# Patient Record
Sex: FEMALE | Race: BLACK OR AFRICAN AMERICAN | NOT HISPANIC OR LATINO | Employment: OTHER | URBAN - METROPOLITAN AREA
[De-identification: names, ages, dates, MRNs, and addresses within clinical notes are randomized per-mention and may not be internally consistent; named-entity substitution may affect disease eponyms.]

---

## 2023-03-18 DIAGNOSIS — E11.9 TYPE 2 DIABETES MELLITUS WITHOUT COMPLICATION, WITH LONG-TERM CURRENT USE OF INSULIN (MULTI): Primary | ICD-10-CM

## 2023-03-18 DIAGNOSIS — Z79.4 TYPE 2 DIABETES MELLITUS WITHOUT COMPLICATION, WITH LONG-TERM CURRENT USE OF INSULIN (MULTI): Primary | ICD-10-CM

## 2023-03-20 RX ORDER — INSULIN GLARGINE 100 [IU]/ML
INJECTION, SOLUTION SUBCUTANEOUS
Qty: 15 ML | Refills: 5 | Status: SHIPPED | OUTPATIENT
Start: 2023-03-20 | End: 2024-02-18 | Stop reason: SDUPTHER

## 2023-05-18 DIAGNOSIS — F32.A DEPRESSION, UNSPECIFIED DEPRESSION TYPE: ICD-10-CM

## 2023-05-18 DIAGNOSIS — I10 PRIMARY HYPERTENSION: Primary | ICD-10-CM

## 2023-05-19 RX ORDER — ESCITALOPRAM OXALATE 10 MG/1
TABLET ORAL
Qty: 90 TABLET | Refills: 1 | Status: SHIPPED | OUTPATIENT
Start: 2023-05-19 | End: 2024-02-26 | Stop reason: SDUPTHER

## 2023-05-19 RX ORDER — VALSARTAN AND HYDROCHLOROTHIAZIDE 160; 25 MG/1; MG/1
TABLET ORAL
Qty: 90 TABLET | Refills: 3 | Status: SHIPPED | OUTPATIENT
Start: 2023-05-19 | End: 2024-02-26 | Stop reason: SDUPTHER

## 2023-06-17 DIAGNOSIS — I10 PRIMARY HYPERTENSION: Primary | ICD-10-CM

## 2023-06-19 RX ORDER — ISOSORBIDE MONONITRATE 60 MG/1
TABLET, EXTENDED RELEASE ORAL
Qty: 90 TABLET | Refills: 1 | Status: SHIPPED | OUTPATIENT
Start: 2023-06-19 | End: 2024-02-26 | Stop reason: SDUPTHER

## 2023-07-11 DIAGNOSIS — E08.00 DIABETES MELLITUS DUE TO UNDERLYING CONDITION WITH HYPEROSMOLARITY WITHOUT COMA, WITHOUT LONG-TERM CURRENT USE OF INSULIN (MULTI): Primary | ICD-10-CM

## 2023-07-12 RX ORDER — PEN NEEDLE, DIABETIC 31 GX5/16"
NEEDLE, DISPOSABLE MISCELLANEOUS
Qty: 100 EACH | Refills: 5 | Status: SHIPPED | OUTPATIENT
Start: 2023-07-12 | End: 2023-11-09 | Stop reason: SDUPTHER

## 2023-07-17 PROBLEM — I21.4 NON-ST ELEVATION (NSTEMI) MYOCARDIAL INFARCTION (MULTI): Status: ACTIVE | Noted: 2023-07-17

## 2023-07-17 PROBLEM — I63.9 STROKE, EMBOLIC (MULTI): Status: ACTIVE | Noted: 2023-07-17

## 2023-07-17 PROBLEM — E78.5 HYPERLIPIDEMIA: Status: ACTIVE | Noted: 2023-07-17

## 2023-07-17 PROBLEM — M17.0 OSTEOARTHRITIS OF BOTH KNEES: Status: ACTIVE | Noted: 2023-07-17

## 2023-07-17 PROBLEM — E11.40 DIABETIC NEUROPATHY (MULTI): Status: ACTIVE | Noted: 2023-07-17

## 2023-07-17 PROBLEM — I25.10 2-VESSEL CORONARY ARTERY DISEASE: Status: ACTIVE | Noted: 2023-07-17

## 2023-07-17 PROBLEM — I25.5 ISCHEMIC DILATED CARDIOMYOPATHY (MULTI): Status: ACTIVE | Noted: 2023-07-17

## 2023-07-17 PROBLEM — G47.33 OSA (OBSTRUCTIVE SLEEP APNEA): Status: ACTIVE | Noted: 2023-07-17

## 2023-07-17 PROBLEM — M19.079 ARTHRITIS OF FOOT: Status: ACTIVE | Noted: 2023-07-17

## 2023-07-17 PROBLEM — I42.0 ISCHEMIC DILATED CARDIOMYOPATHY (MULTI): Status: ACTIVE | Noted: 2023-07-17

## 2023-07-17 PROBLEM — G89.29 CHRONIC PAIN OF BOTH KNEES: Status: ACTIVE | Noted: 2023-07-17

## 2023-07-17 PROBLEM — N60.99 ATYPICAL DUCTAL HYPERPLASIA OF BREAST: Status: ACTIVE | Noted: 2023-07-17

## 2023-07-17 PROBLEM — N18.31 DIABETES MELLITUS WITH STAGE 3A CHRONIC KIDNEY DISEASE, WITHOUT LONG-TERM CURRENT USE OF INSULIN (MULTI): Status: ACTIVE | Noted: 2023-07-17

## 2023-07-17 PROBLEM — N18.31 CHRONIC RENAL IMPAIRMENT, STAGE 3A (MULTI): Status: ACTIVE | Noted: 2023-07-17

## 2023-07-17 PROBLEM — E66.9 OBESITY: Status: ACTIVE | Noted: 2023-07-17

## 2023-07-17 PROBLEM — M25.561 CHRONIC PAIN OF BOTH KNEES: Status: ACTIVE | Noted: 2023-07-17

## 2023-07-17 PROBLEM — M25.562 CHRONIC PAIN OF BOTH KNEES: Status: ACTIVE | Noted: 2023-07-17

## 2023-07-17 PROBLEM — N95.1 MENOPAUSE SYNDROME: Status: ACTIVE | Noted: 2023-07-17

## 2023-07-17 PROBLEM — E11.22 DIABETES MELLITUS WITH STAGE 3A CHRONIC KIDNEY DISEASE, WITHOUT LONG-TERM CURRENT USE OF INSULIN (MULTI): Status: ACTIVE | Noted: 2023-07-17

## 2023-07-17 PROBLEM — I10 BENIGN ESSENTIAL HYPERTENSION: Status: ACTIVE | Noted: 2023-07-17

## 2023-07-17 PROBLEM — F41.8 DEPRESSION WITH ANXIETY: Status: ACTIVE | Noted: 2023-07-17

## 2023-07-17 PROBLEM — Z04.9 CONDITION NOT FOUND: Status: ACTIVE | Noted: 2023-07-17

## 2023-07-17 PROBLEM — G62.9 PERIPHERAL NEUROPATHY: Status: ACTIVE | Noted: 2023-07-17

## 2023-07-20 DIAGNOSIS — E78.5 HYPERLIPIDEMIA, UNSPECIFIED HYPERLIPIDEMIA TYPE: ICD-10-CM

## 2023-07-21 RX ORDER — ATORVASTATIN CALCIUM 80 MG/1
TABLET, FILM COATED ORAL
Qty: 90 TABLET | Refills: 0 | Status: SHIPPED | OUTPATIENT
Start: 2023-07-21 | End: 2023-10-20

## 2023-08-01 ENCOUNTER — OFFICE VISIT (OUTPATIENT)
Dept: PRIMARY CARE | Facility: CLINIC | Age: 73
End: 2023-08-01
Payer: MEDICARE

## 2023-08-01 VITALS
DIASTOLIC BLOOD PRESSURE: 80 MMHG | BODY MASS INDEX: 34.9 KG/M2 | WEIGHT: 197 LBS | TEMPERATURE: 97.2 F | SYSTOLIC BLOOD PRESSURE: 130 MMHG

## 2023-08-01 DIAGNOSIS — I63.10 CEREBROVASCULAR ACCIDENT (CVA) DUE TO EMBOLISM OF PRECEREBRAL ARTERY (MULTI): ICD-10-CM

## 2023-08-01 DIAGNOSIS — M17.0 PRIMARY OSTEOARTHRITIS OF BOTH KNEES: ICD-10-CM

## 2023-08-01 DIAGNOSIS — N18.31 CHRONIC RENAL IMPAIRMENT, STAGE 3A (MULTI): ICD-10-CM

## 2023-08-01 DIAGNOSIS — I25.5 ISCHEMIC DILATED CARDIOMYOPATHY (MULTI): ICD-10-CM

## 2023-08-01 DIAGNOSIS — I42.0 ISCHEMIC DILATED CARDIOMYOPATHY (MULTI): ICD-10-CM

## 2023-08-01 DIAGNOSIS — E08.00 DIABETES MELLITUS DUE TO UNDERLYING CONDITION WITH HYPEROSMOLARITY WITHOUT COMA, WITH LONG-TERM CURRENT USE OF INSULIN (MULTI): Primary | ICD-10-CM

## 2023-08-01 DIAGNOSIS — F41.8 DEPRESSION WITH ANXIETY: ICD-10-CM

## 2023-08-01 DIAGNOSIS — I10 PRIMARY HYPERTENSION: ICD-10-CM

## 2023-08-01 DIAGNOSIS — Z79.4 DIABETES MELLITUS DUE TO UNDERLYING CONDITION WITH HYPEROSMOLARITY WITHOUT COMA, WITH LONG-TERM CURRENT USE OF INSULIN (MULTI): Primary | ICD-10-CM

## 2023-08-01 PROBLEM — E11.22 DIABETES MELLITUS WITH STAGE 3A CHRONIC KIDNEY DISEASE, WITHOUT LONG-TERM CURRENT USE OF INSULIN (MULTI): Status: RESOLVED | Noted: 2023-07-17 | Resolved: 2023-08-01

## 2023-08-01 PROBLEM — E11.40 DIABETIC NEUROPATHY (MULTI): Status: RESOLVED | Noted: 2023-07-17 | Resolved: 2023-08-01

## 2023-08-01 PROCEDURE — 99214 OFFICE O/P EST MOD 30 MIN: CPT | Performed by: INTERNAL MEDICINE

## 2023-08-01 PROCEDURE — 1036F TOBACCO NON-USER: CPT | Performed by: INTERNAL MEDICINE

## 2023-08-01 PROCEDURE — 1160F RVW MEDS BY RX/DR IN RCRD: CPT | Performed by: INTERNAL MEDICINE

## 2023-08-01 PROCEDURE — 1159F MED LIST DOCD IN RCRD: CPT | Performed by: INTERNAL MEDICINE

## 2023-08-01 PROCEDURE — 1126F AMNT PAIN NOTED NONE PRSNT: CPT | Performed by: INTERNAL MEDICINE

## 2023-08-01 PROCEDURE — 3079F DIAST BP 80-89 MM HG: CPT | Performed by: INTERNAL MEDICINE

## 2023-08-01 PROCEDURE — 3075F SYST BP GE 130 - 139MM HG: CPT | Performed by: INTERNAL MEDICINE

## 2023-08-01 RX ORDER — AMLODIPINE BESYLATE 5 MG/1
1 TABLET ORAL DAILY
COMMUNITY
Start: 2018-01-22 | End: 2024-02-26 | Stop reason: SDUPTHER

## 2023-08-01 RX ORDER — HYDRALAZINE HYDROCHLORIDE 25 MG/1
TABLET, FILM COATED ORAL 2 TIMES DAILY
COMMUNITY
End: 2024-01-29 | Stop reason: WASHOUT

## 2023-08-01 RX ORDER — GLIPIZIDE 10 MG/1
TABLET, FILM COATED, EXTENDED RELEASE ORAL 2 TIMES DAILY
COMMUNITY

## 2023-08-01 RX ORDER — HYALURONATE SODIUM, STABILIZED 60 MG/3 ML
SYRINGE (ML) INTRAARTICULAR
COMMUNITY
Start: 2022-04-20

## 2023-08-01 RX ORDER — CLOPIDOGREL BISULFATE 75 MG/1
1 TABLET ORAL DAILY
COMMUNITY
Start: 2019-05-16 | End: 2024-01-29 | Stop reason: WASHOUT

## 2023-08-01 RX ORDER — CARVEDILOL 12.5 MG/1
TABLET ORAL 2 TIMES DAILY
COMMUNITY
End: 2024-01-29 | Stop reason: SDUPTHER

## 2023-08-01 RX ORDER — FUROSEMIDE 20 MG/1
20 TABLET ORAL
COMMUNITY
Start: 2008-09-10 | End: 2024-01-29 | Stop reason: WASHOUT

## 2023-08-01 RX ORDER — EMPAGLIFLOZIN 10 MG/1
10 TABLET, FILM COATED ORAL DAILY
COMMUNITY
End: 2024-02-26 | Stop reason: SDUPTHER

## 2023-08-01 RX ORDER — CARVEDILOL 25 MG/1
25 TABLET ORAL DAILY
COMMUNITY
Start: 2008-10-01 | End: 2024-01-29 | Stop reason: WASHOUT

## 2023-08-01 RX ORDER — BLOOD-GLUCOSE SENSOR
1 EACH MISCELLANEOUS DAILY
Qty: 1 EACH | Refills: 1 | Status: SHIPPED | OUTPATIENT
Start: 2023-08-01

## 2023-08-01 RX ORDER — METOPROLOL SUCCINATE 25 MG/1
12.5 TABLET, EXTENDED RELEASE ORAL NIGHTLY
COMMUNITY
End: 2024-02-26 | Stop reason: SDUPTHER

## 2023-08-01 RX ORDER — ASPIRIN 81 MG/1
1 TABLET ORAL DAILY
COMMUNITY
Start: 2022-07-27

## 2023-08-01 RX ORDER — BLOOD SUGAR DIAGNOSTIC
STRIP MISCELLANEOUS
COMMUNITY
Start: 2022-01-27

## 2023-08-01 RX ORDER — GABAPENTIN 100 MG/1
100 CAPSULE ORAL 3 TIMES DAILY
COMMUNITY

## 2023-08-01 ASSESSMENT — ENCOUNTER SYMPTOMS
BLOOD IN STOOL: 0
CHEST TIGHTNESS: 0
SHORTNESS OF BREATH: 0
NECK PAIN: 0
FLANK PAIN: 0
LOSS OF SENSATION IN FEET: 0
BACK PAIN: 0
WEAKNESS: 0
NERVOUS/ANXIOUS: 0
OCCASIONAL FEELINGS OF UNSTEADINESS: 0
LIGHT-HEADEDNESS: 0
FREQUENCY: 0
DYSURIA: 0
ARTHRALGIAS: 1
HEADACHES: 0
SORE THROAT: 0
PALPITATIONS: 0
DIFFICULTY URINATING: 0
DEPRESSION: 0
APPETITE CHANGE: 0
DECREASED CONCENTRATION: 0
WHEEZING: 0
SLEEP DISTURBANCE: 0
COUGH: 0
DIZZINESS: 0
CHILLS: 0
ACTIVITY CHANGE: 0
ABDOMINAL PAIN: 0
UNEXPECTED WEIGHT CHANGE: 0

## 2023-08-01 NOTE — PROGRESS NOTES
Subjective   Patient ID: Keesha Franklin is a 73 y.o. female.    HPI patient is seen today for routine follow-up   Her medical history is significant for history of embolic stroke, anxiety, depression, hypertension, chronic kidney disease, dilated cardiomyopathy.  She is experiencing worsening pain in both knees.  She is currently following with orthopedics.  She is considering getting knee replacement in the next few months.  Review of Systems   Constitutional:  Negative for activity change, appetite change, chills and unexpected weight change.   HENT:  Negative for congestion, postnasal drip and sore throat.    Eyes:  Negative for visual disturbance.   Respiratory:  Negative for cough, chest tightness, shortness of breath and wheezing.    Cardiovascular:  Negative for chest pain, palpitations and leg swelling.   Gastrointestinal:  Negative for abdominal pain and blood in stool.   Endocrine: Negative for cold intolerance and heat intolerance.   Genitourinary:  Negative for difficulty urinating, dysuria, flank pain and frequency.   Musculoskeletal:  Positive for arthralgias. Negative for back pain, gait problem and neck pain.        B/L knee pain   Skin:  Negative for rash.   Allergic/Immunologic: Negative for environmental allergies and food allergies.   Neurological:  Negative for dizziness, weakness, light-headedness and headaches.   Psychiatric/Behavioral:  Negative for decreased concentration and sleep disturbance. The patient is not nervous/anxious.        Objective   Visit Vitals  /80   Temp 36.2 °C (97.2 °F)   Wt 89.4 kg (197 lb)   BMI 34.90 kg/m²   Smoking Status Never   BSA 1.99 m²      Physical Exam  Vitals reviewed.   Constitutional:       General: She is not in acute distress.     Appearance: Normal appearance.   HENT:      Head: Normocephalic and atraumatic.      Mouth/Throat:      Mouth: Mucous membranes are moist.   Eyes:      Extraocular Movements: Extraocular movements intact.       Conjunctiva/sclera: Conjunctivae normal.      Pupils: Pupils are equal, round, and reactive to light.   Cardiovascular:      Rate and Rhythm: Normal rate and regular rhythm.      Pulses: Normal pulses.   Pulmonary:      Effort: Pulmonary effort is normal. No respiratory distress.      Breath sounds: Normal breath sounds.   Abdominal:      General: Bowel sounds are normal. There is no distension.      Tenderness: There is no abdominal tenderness.   Musculoskeletal:         General: No swelling or tenderness. Normal range of motion.      Cervical back: Normal range of motion.   Skin:     General: Skin is warm.   Neurological:      General: No focal deficit present.      Mental Status: She is alert.      Coordination: Coordination normal.      Gait: Gait normal.   Psychiatric:         Mood and Affect: Mood normal.         Behavior: Behavior normal.         Assessment/Plan   Diagnoses and all orders for this visit:  Diabetes mellitus due to underlying condition with hyperosmolarity without coma, with long-term current use of insulin (CMS/HCC)  Comments:  Continue with Jardiance, Lantus , glipizide  Check hemoglobin A1c  Orders:  -     Comprehensive Metabolic Panel; Future  -     Hemoglobin A1C; Future  -     blood-glucose sensor (Dexcom G7 Sensor) device; 1 kit once daily.  Ischemic dilated cardiomyopathy (CMS/HCC)  Comments:  Continue with hydralazine, isosorbide, carvedilol,  Chronic renal impairment, stage 3a  Comments:  Check renal function today  Orders:  -     Comprehensive Metabolic Panel; Future  Depression with anxiety  Comments:  Well-controlled  Continue with Lexapro  Primary osteoarthritis of both knees  Comments:  Encouraged muscle strengthening-consider water classes  Follow-up with orthopedics as scheduled  Cerebrovascular accident (CVA) due to embolism of precerebral artery (CMS/HCC)  Comments:  Continue with atorvastatin 80 mg daily  Continue with Plavix  Primary  hypertension  Comments:  Well-controlled  Continue with current medications  Orders:  -     Comprehensive Metabolic Panel; Future

## 2023-08-16 ENCOUNTER — LAB (OUTPATIENT)
Dept: LAB | Facility: LAB | Age: 73
End: 2023-08-16
Payer: MEDICARE

## 2023-08-16 DIAGNOSIS — N18.31 CHRONIC RENAL IMPAIRMENT, STAGE 3A (MULTI): ICD-10-CM

## 2023-08-16 DIAGNOSIS — Z79.4 DIABETES MELLITUS DUE TO UNDERLYING CONDITION WITH HYPEROSMOLARITY WITHOUT COMA, WITH LONG-TERM CURRENT USE OF INSULIN (MULTI): ICD-10-CM

## 2023-08-16 DIAGNOSIS — I10 PRIMARY HYPERTENSION: ICD-10-CM

## 2023-08-16 DIAGNOSIS — E08.00 DIABETES MELLITUS DUE TO UNDERLYING CONDITION WITH HYPEROSMOLARITY WITHOUT COMA, WITH LONG-TERM CURRENT USE OF INSULIN (MULTI): ICD-10-CM

## 2023-08-16 LAB
ALANINE AMINOTRANSFERASE (SGPT) (U/L) IN SER/PLAS: 20 U/L (ref 7–45)
ALBUMIN (G/DL) IN SER/PLAS: 4.1 G/DL (ref 3.4–5)
ALKALINE PHOSPHATASE (U/L) IN SER/PLAS: 86 U/L (ref 33–136)
ANION GAP IN SER/PLAS: 15 MMOL/L (ref 10–20)
ASPARTATE AMINOTRANSFERASE (SGOT) (U/L) IN SER/PLAS: 24 U/L (ref 9–39)
BILIRUBIN TOTAL (MG/DL) IN SER/PLAS: 0.8 MG/DL (ref 0–1.2)
CALCIUM (MG/DL) IN SER/PLAS: 9.8 MG/DL (ref 8.6–10.6)
CARBON DIOXIDE, TOTAL (MMOL/L) IN SER/PLAS: 29 MMOL/L (ref 21–32)
CHLORIDE (MMOL/L) IN SER/PLAS: 103 MMOL/L (ref 98–107)
CREATININE (MG/DL) IN SER/PLAS: 1.3 MG/DL (ref 0.5–1.05)
ESTIMATED AVERAGE GLUCOSE FOR HBA1C: 160 MG/DL
GFR FEMALE: 43 ML/MIN/1.73M2
GLUCOSE (MG/DL) IN SER/PLAS: 71 MG/DL (ref 74–99)
HEMOGLOBIN A1C/HEMOGLOBIN TOTAL IN BLOOD: 7.2 %
POTASSIUM (MMOL/L) IN SER/PLAS: 3.7 MMOL/L (ref 3.5–5.3)
PROTEIN TOTAL: 7 G/DL (ref 6.4–8.2)
SODIUM (MMOL/L) IN SER/PLAS: 143 MMOL/L (ref 136–145)
UREA NITROGEN (MG/DL) IN SER/PLAS: 30 MG/DL (ref 6–23)

## 2023-08-16 PROCEDURE — 80053 COMPREHEN METABOLIC PANEL: CPT

## 2023-08-16 PROCEDURE — 83036 HEMOGLOBIN GLYCOSYLATED A1C: CPT

## 2023-08-16 PROCEDURE — 36415 COLL VENOUS BLD VENIPUNCTURE: CPT

## 2023-08-21 ENCOUNTER — TELEPHONE (OUTPATIENT)
Dept: PRIMARY CARE | Facility: CLINIC | Age: 73
End: 2023-08-21
Payer: MEDICARE

## 2023-08-21 NOTE — TELEPHONE ENCOUNTER
----- Message from Gabriel Mccabe MD sent at 8/17/2023  2:15 PM EDT -----  Please notify her that hemoglobin A1c is improving-7.2.  Kidney function is slightly worse.  Increase fluid intake and continue with current medications.

## 2023-09-05 ENCOUNTER — OFFICE VISIT (OUTPATIENT)
Dept: PRIMARY CARE | Facility: CLINIC | Age: 73
End: 2023-09-05
Payer: MEDICARE

## 2023-09-05 VITALS
WEIGHT: 200 LBS | SYSTOLIC BLOOD PRESSURE: 136 MMHG | TEMPERATURE: 97.1 F | DIASTOLIC BLOOD PRESSURE: 77 MMHG | BODY MASS INDEX: 35.43 KG/M2

## 2023-09-05 DIAGNOSIS — E78.49 OTHER HYPERLIPIDEMIA: ICD-10-CM

## 2023-09-05 DIAGNOSIS — Z79.4 DIABETES MELLITUS DUE TO UNDERLYING CONDITION WITH HYPEROSMOLARITY WITHOUT COMA, WITH LONG-TERM CURRENT USE OF INSULIN (MULTI): ICD-10-CM

## 2023-09-05 DIAGNOSIS — I10 BENIGN ESSENTIAL HYPERTENSION: Primary | ICD-10-CM

## 2023-09-05 DIAGNOSIS — F41.8 DEPRESSION WITH ANXIETY: ICD-10-CM

## 2023-09-05 DIAGNOSIS — I50.22 CHRONIC SYSTOLIC HEART FAILURE (MULTI): ICD-10-CM

## 2023-09-05 DIAGNOSIS — E08.00 DIABETES MELLITUS DUE TO UNDERLYING CONDITION WITH HYPEROSMOLARITY WITHOUT COMA, WITH LONG-TERM CURRENT USE OF INSULIN (MULTI): ICD-10-CM

## 2023-09-05 DIAGNOSIS — E66.01 OBESITY, MORBID (MULTI): ICD-10-CM

## 2023-09-05 DIAGNOSIS — N18.31 CHRONIC RENAL IMPAIRMENT, STAGE 3A (MULTI): ICD-10-CM

## 2023-09-05 PROBLEM — Z85.3 PERSONAL HISTORY OF MALIGNANT NEOPLASM OF BREAST: Status: ACTIVE | Noted: 2023-09-05

## 2023-09-05 PROCEDURE — 1036F TOBACCO NON-USER: CPT | Performed by: INTERNAL MEDICINE

## 2023-09-05 PROCEDURE — 3078F DIAST BP <80 MM HG: CPT | Performed by: INTERNAL MEDICINE

## 2023-09-05 PROCEDURE — 1159F MED LIST DOCD IN RCRD: CPT | Performed by: INTERNAL MEDICINE

## 2023-09-05 PROCEDURE — 3075F SYST BP GE 130 - 139MM HG: CPT | Performed by: INTERNAL MEDICINE

## 2023-09-05 PROCEDURE — 1160F RVW MEDS BY RX/DR IN RCRD: CPT | Performed by: INTERNAL MEDICINE

## 2023-09-05 PROCEDURE — 99214 OFFICE O/P EST MOD 30 MIN: CPT | Performed by: INTERNAL MEDICINE

## 2023-09-05 PROCEDURE — 1126F AMNT PAIN NOTED NONE PRSNT: CPT | Performed by: INTERNAL MEDICINE

## 2023-09-05 PROCEDURE — 3051F HG A1C>EQUAL 7.0%<8.0%: CPT | Performed by: INTERNAL MEDICINE

## 2023-09-05 ASSESSMENT — ENCOUNTER SYMPTOMS
DIZZINESS: 0
COUGH: 0
HEADACHES: 0
MYALGIAS: 0
ABDOMINAL PAIN: 0
CONSTIPATION: 0
FATIGUE: 0
SHORTNESS OF BREATH: 0
PALPITATIONS: 0
BACK PAIN: 0
DIARRHEA: 0
CHILLS: 0
NECK PAIN: 0
BLOOD IN STOOL: 0
FEVER: 0
ARTHRALGIAS: 0

## 2023-09-05 NOTE — PROGRESS NOTES
Subjective   Patient ID: Keesha Franklin is a 73 y.o. female.    HPI  Patient is seen today for medication concerns.  She has diabetes and was taking Januvia.  She states that her insurance stopped covering and she has to pay a lot to get medication filled.  She would like to switch to Ozempic.  Her medical history is significant for CAD,hypertension, history of CVA, hyperlipidemia, chronic systolic heart failure, anxiety and depression.  She feels well and is taking all her medications as prescribed.  Review of Systems   Constitutional:  Negative for chills, fatigue and fever.   Respiratory:  Negative for cough and shortness of breath.    Cardiovascular:  Negative for chest pain, palpitations and leg swelling.   Gastrointestinal:  Negative for abdominal pain, blood in stool, constipation and diarrhea.   Endocrine: Negative for cold intolerance and heat intolerance.   Musculoskeletal:  Negative for arthralgias, back pain, myalgias and neck pain.   Neurological:  Negative for dizziness and headaches.       Objective   Physical Exam  Vitals reviewed.   Constitutional:       General: She is not in acute distress.     Appearance: Normal appearance.   HENT:      Head: Normocephalic and atraumatic.   Cardiovascular:      Rate and Rhythm: Normal rate and regular rhythm.      Pulses: Normal pulses.   Pulmonary:      Effort: Pulmonary effort is normal. No respiratory distress.      Breath sounds: Normal breath sounds.   Abdominal:      General: Bowel sounds are normal. There is no distension.      Tenderness: There is no abdominal tenderness.   Musculoskeletal:         General: No swelling or tenderness. Normal range of motion.      Cervical back: Normal range of motion.   Skin:     General: Skin is warm.   Neurological:      General: No focal deficit present.      Mental Status: She is alert.      Coordination: Coordination normal.      Gait: Gait normal.   Psychiatric:         Mood and Affect: Mood normal.          Behavior: Behavior normal.         Assessment/Plan   Diagnoses and all orders for this visit:  Benign essential hypertension  Comments:  With amlodipine, valsartan/hydrochlorothiazide  Orders:  -     CBC; Future  -     Comprehensive Metabolic Panel; Future  -     Lipid Panel; Future  -     TSH with reflex to Free T4 if abnormal; Future  Other hyperlipidemia  -     TSH with reflex to Free T4 if abnormal; Future  Chronic systolic heart failure (CMS/HCC)  Comments:  Stable  Continue with carvedilol, hydralazine  Depression with anxiety  Comments:  Well-controlled  Continue with escitalopram  BMI 35.0-35.9,adult  -     semaglutide 0.25 mg or 0.5 mg (2 mg/3 mL) pen injector; Inject 0.25 mg under the skin 1 (one) time per week.  Chronic renal impairment, stage 3a (CMS/HCC)  Comments:  Monitor renal function  Diabetes mellitus due to underlying condition with hyperosmolarity without coma, with long-term current use of insulin (CMS/HCC)  Comments:  Discontinue Jardiance  Start Ozempic 0.25 mg injection once a week-side effects and mechanism of action explained and discussed in detail with the patient.  Orders:  -     Hemoglobin A1C; Future  Obesity, morbid (CMS/HCC)  Comments:  Continue with healthy diet and regular exercise  Follow in 3 mths

## 2023-09-05 NOTE — PATIENT INSTRUCTIONS
Start Ozempic injection-0.25 mg once a week  Eat healthy, increase your water intake and eat a balanced well-balanced diet  Discontinue Januvia  Blood work to be done 3 to 4 days before your next appointment  Follow-up in 3 months

## 2023-10-20 DIAGNOSIS — E78.5 HYPERLIPIDEMIA, UNSPECIFIED HYPERLIPIDEMIA TYPE: ICD-10-CM

## 2023-10-20 RX ORDER — ATORVASTATIN CALCIUM 80 MG/1
TABLET, FILM COATED ORAL
Qty: 90 TABLET | Refills: 0 | Status: SHIPPED | OUTPATIENT
Start: 2023-10-20 | End: 2024-02-26 | Stop reason: SDUPTHER

## 2023-10-30 PROBLEM — R92.8 ABNORMAL FINDING ON BREAST IMAGING: Status: ACTIVE | Noted: 2023-10-30

## 2023-10-30 PROBLEM — R29.818 SUSPECTED SLEEP APNEA: Status: ACTIVE | Noted: 2023-10-30

## 2023-10-30 PROBLEM — R79.89 LOW VITAMIN D LEVEL: Status: ACTIVE | Noted: 2023-10-30

## 2023-10-30 RX ORDER — NITROGLYCERIN 0.4 MG/1
0.4 TABLET SUBLINGUAL EVERY 5 MIN PRN
COMMUNITY

## 2023-10-31 ENCOUNTER — OFFICE VISIT (OUTPATIENT)
Dept: ORTHOPEDIC SURGERY | Facility: CLINIC | Age: 73
End: 2023-10-31
Payer: MEDICARE

## 2023-10-31 DIAGNOSIS — M17.0 BILATERAL PRIMARY OSTEOARTHRITIS OF KNEE: Primary | ICD-10-CM

## 2023-10-31 PROCEDURE — 1126F AMNT PAIN NOTED NONE PRSNT: CPT | Performed by: STUDENT IN AN ORGANIZED HEALTH CARE EDUCATION/TRAINING PROGRAM

## 2023-10-31 PROCEDURE — 20610 DRAIN/INJ JOINT/BURSA W/O US: CPT | Performed by: STUDENT IN AN ORGANIZED HEALTH CARE EDUCATION/TRAINING PROGRAM

## 2023-10-31 PROCEDURE — 2500000004 HC RX 250 GENERAL PHARMACY W/ HCPCS (ALT 636 FOR OP/ED): Performed by: STUDENT IN AN ORGANIZED HEALTH CARE EDUCATION/TRAINING PROGRAM

## 2023-10-31 PROCEDURE — 99214 OFFICE O/P EST MOD 30 MIN: CPT | Performed by: STUDENT IN AN ORGANIZED HEALTH CARE EDUCATION/TRAINING PROGRAM

## 2023-10-31 PROCEDURE — 2500000005 HC RX 250 GENERAL PHARMACY W/O HCPCS: Performed by: STUDENT IN AN ORGANIZED HEALTH CARE EDUCATION/TRAINING PROGRAM

## 2023-10-31 PROCEDURE — 1159F MED LIST DOCD IN RCRD: CPT | Performed by: STUDENT IN AN ORGANIZED HEALTH CARE EDUCATION/TRAINING PROGRAM

## 2023-10-31 PROCEDURE — 1036F TOBACCO NON-USER: CPT | Performed by: STUDENT IN AN ORGANIZED HEALTH CARE EDUCATION/TRAINING PROGRAM

## 2023-10-31 RX ADMIN — LIDOCAINE HYDROCHLORIDE 4 ML: 10 INJECTION, SOLUTION INFILTRATION; PERINEURAL at 17:00

## 2023-10-31 RX ADMIN — TRIAMCINOLONE ACETONIDE 1 ML: 40 INJECTION, SUSPENSION INTRA-ARTICULAR; INTRAMUSCULAR at 17:00

## 2023-10-31 NOTE — PROGRESS NOTES
Keesha Franklin is a pleasant 73 year-old female who is seen today for evaluation of bilateral (right > left) knee pain follow up appointment. Last seen by myself 4/18/2023. The pain has been present for months/years. At that time we discussed treatment options. Radiographically and symptomatically patient was a candidate for TKA, however she had an A1c >7.5. Patient instead was given bilateral knee injections which she says worked for 3 months. Today patient returns with similar/worsening right knee pain. Most recent A1c 7.2.     The onset of pain was not associated with a traumatic injury.  She states that the pain is located in the medial aspect of the right knee and medial aspect of the left knee.  She denies any history of inflammatory arthritis.  The pain is aggravated by standing from a seated position and alleviated by rest.     The patient has tried Tylenol, anti-inflammatories, activity modifications, weight loss, physical therapy and intra-articular injections without sufficient relief of symptoms. The patient does not require the use of an assistive device. During this time the patient has had a significant reduction in her quality of life and activities of daily living. She does not report feeling unstable on uneven surfaces.  She can only walk short distances before stopping to rest secondary to knee pain. Denies any hip or groin pain.  She denies any numbness or tingling of the bilateral lower extremities    Pertinent PMH: DM (Aic most recent 7.2, mild MI 2018, mild stroke 2017, on baby aspirin    General: Well-appearing female in no acute distress.  Awake, alert and oriented.  Pleasant and cooperative.  Respiratory: Non-labored breathing  Mood: Euthymic   Gait: Normal  Assistive Device: None     Affected Right Knee  Limb Alignment: Mild Valgus  ROM: 5 - 120+  Stable to varus and valgus stress at full extension and 30 degrees of flexion  Skin: Intact, no abrasions or draining sinuses  Effusion:  None  Quad Strength: 5/5  Hamstring Strength: 5/5  Patella Crepitus: None  Patella Grind: negative  Tenderness: medial joint line  Sensation: Intact to light touch distally  Motor function: Able to fire TA, EHL, G/S  Pulses: Palpable DP pulse    Affected Left Knee  Limb Alignment: Mild Valgus  ROM: 5 - 120  Stable to varus and valgus stress at full extension and 30 degrees of flexion  Skin: Intact, no abrasions or draining sinuses  Effusion: None  Quad Strength: 5/5  Hamstring Strength: 5/5  Patella Crepitus: audible  Patella Grind: negative  Tenderness: medial joint line  Sensation: Intact to light touch distally  Motor function: Able to fire TA, EHL, G/S  Pulses: Palpable DP pulse    Imaging:   AP/ lateral/ mid-flexion/sunrise views: Independent review of bilateral knee x-rays was performed. The findings were reviewed with the patient. There are moderate degenerative changes of the left knee. There is joint space narrowing, subchondral sclerosis, and osteophyte formation. No evidence of fracture, AVN, dislocation, osteomyelitis.      Previous Total Joint Replacement: [No]   Charnley Classification:  Unilateral Joint Arthritis, no other disability: No  Bilateral Joint Arthritis, no other disability: [Yes]  Unilateral or bilateral joint arthritis with other joints/medical conditions affecting function: [No]     A&P:  I had an in-depth discussion about the nature and natural history of degenerative joint disease.  I explained that it is progressive, but there is no way to predict how quickly a patient's symptoms will get worse.  I advised them that she can manage their pain symptomatically, with 3-5 day courses of nonsteroidal anti-inflammatories and activity modification as needed when there is a a flare up. I explained to them that the best interventions she can take to perhaps slow the progression of the degenerative changes in the long-term are maintaining a healthy weight and performing low impact exercise such  as cycling, walking, and swimming. The use of a walking stick, cane or other assistive device can help as well.     I also discussed more invasive treatment options including injections. I talked about the risks and benefits of injections, focusing on the fact that there is no way to predict the degree or duration of symptom relief, but that it can provide weeks to months of pain relief in most patients. Intra-articular hip injections would be performed under image guidance by another provider.     Should these measures fail, the most invasive option would be joint replacement surgery. The patient should try and delay joint replacement surgery for as long as possible. If the patients' joint problem affects their quality of life and cause significant restrictions of their activities, they may want to consider joint replacement surgery. I briefly covered the risks, benefits and expected recovery after total joint arthroplasty.     Keesha for more than six months has had limited function as well as persistent and severe pain which has negatively impacted the quality of life and interfered with activities of daily living. Under my care or the care of other providers, for greater than the three months, conservative treatment including activity modification, over the counter pain medications, injections, physical therapy and/or recommended home exercise program, have provided only minimal relief. The patient has not had an intra-articular injection in the past 3 months. The option to continue with conservative measures in lieu of arthroplasty was discussed and offered. However, given the failure of these conservative measures and the clinical and radiographic evidence of end-stage arthritis, the patient is a good candidate for an elective total knee arthroplasty. The stated potential benefits include pain relief and improved function were discussed but no guarantees were offered. Some patients may experience improved  range of motion but pre-operative range of motion remains the strongest predictor of post-operative range of motion.     The patient requested bilateral knee injections today as they worked in the past. We will plan on scheduling the patient for surgery no sooner than 4 months from today after her knee injections.     Patient ID: Keesha Franklin is a 73 y.o. female.    L Inj/Asp: bilateral knee on 10/31/2023 5:00 PM  Indications: pain and joint swelling  Details: 22 G needle, anterolateral approach  Medications (Right): 1 mL triamcinolone acetonide 40 mg/mL; 4 mL lidocaine 10 mg/mL (1 %)  Medications (Left): 1 mL triamcinolone acetonide 40 mg/mL; 4 mL lidocaine 10 mg/mL (1 %)    Discussion:  I discussed the conservative treatment options for knee osteoarthritis including but not limited to physical therapy, oral NSAIDS, activity and lifestyle modification, and corticosteroid injections. Pt has elected to undergo a cortisone injection today. I have explained the risk and benefits of an injection including the possibility of joint infection, bleeding, damage to cartilage, allergic reaction. Patient verbalized understanding and gave verbal consent wishes to proceed with a intra-articular cortisone injection for their knee.    Procedure:  After discussing the risk and benefits of the procedure, we proceeded with an intra-articular bilateral knee injection.    With the patient's informed verbal consent, the bilateral knees were prepped in standard sterile fashion with Chlorhexidine. The skin was then anesthetized with ethyl chloride spray and cleaned again with Chlorhexidine. The bilateral knees were then apirated/injected with a prefilled 20-gauge syringe of 40 mg Kenalog + 4 ml Lidocaine using the lateral approach without complications.  The patient tolerated this well and felt immediate initial relief of symptoms. A bandaid was applied and the patient ambulated out of the clinic on ther own accord without difficulty.  Patient was instructed to avoid physical activity for 24-48 hours to prevent the knees from swelling and may ice the knees as tolerated. Patient should contact the office if any signs of of infection appear: redness, fever, chills, drainage, swelling or warmth to the knees.  Pt understands that the injections can be repeated no sooner than 3 months.      Procedure, treatment alternatives, risks and benefits explained, specific risks discussed. Consent was given by the patient. Immediately prior to procedure a time out was called to verify the correct patient, procedure, equipment, support staff and site/side marked as required. Patient was prepped and draped in the usual sterile fashion.           We will plan on re-assessing the patient prior to TKA.    All of her questions were answered and he was comfortable with this plan of care.  She was encouraged to call if any problems, questions or concerns arise.     The patient was seen and examined with my resident Dr. Pacheco who assisted with documentation.  I independently interviewed the patient to obtain a history and performed physical examination.  I formulated the plan of care.  I directly supervised administration of the injections.    **This office note was dictated using Dragon voice to text software and was not proofread for spelling or grammatical errors**

## 2023-11-09 DIAGNOSIS — E08.00 DIABETES MELLITUS DUE TO UNDERLYING CONDITION WITH HYPEROSMOLARITY WITHOUT COMA, WITHOUT LONG-TERM CURRENT USE OF INSULIN (MULTI): ICD-10-CM

## 2023-11-09 RX ORDER — PEN NEEDLE, DIABETIC 30 GX3/16"
NEEDLE, DISPOSABLE MISCELLANEOUS
Qty: 100 EACH | Refills: 5 | Status: SHIPPED | OUTPATIENT
Start: 2023-11-09

## 2023-12-05 ENCOUNTER — OFFICE VISIT (OUTPATIENT)
Dept: PRIMARY CARE | Facility: CLINIC | Age: 73
End: 2023-12-05
Payer: MEDICARE

## 2023-12-05 VITALS
BODY MASS INDEX: 34.9 KG/M2 | TEMPERATURE: 97.5 F | SYSTOLIC BLOOD PRESSURE: 144 MMHG | DIASTOLIC BLOOD PRESSURE: 81 MMHG | WEIGHT: 197 LBS

## 2023-12-05 DIAGNOSIS — I50.22 CHRONIC SYSTOLIC HEART FAILURE (MULTI): ICD-10-CM

## 2023-12-05 DIAGNOSIS — Z79.4 DIABETES MELLITUS DUE TO UNDERLYING CONDITION WITH HYPEROSMOLARITY WITHOUT COMA, WITH LONG-TERM CURRENT USE OF INSULIN (MULTI): ICD-10-CM

## 2023-12-05 DIAGNOSIS — E08.00 DIABETES MELLITUS DUE TO UNDERLYING CONDITION WITH HYPEROSMOLARITY WITHOUT COMA, WITH LONG-TERM CURRENT USE OF INSULIN (MULTI): ICD-10-CM

## 2023-12-05 DIAGNOSIS — N18.31 CHRONIC RENAL IMPAIRMENT, STAGE 3A (MULTI): ICD-10-CM

## 2023-12-05 DIAGNOSIS — I10 BENIGN ESSENTIAL HYPERTENSION: Primary | ICD-10-CM

## 2023-12-05 DIAGNOSIS — Z23 PNEUMOCOCCAL VACCINATION ADMINISTERED AT CURRENT VISIT: ICD-10-CM

## 2023-12-05 LAB — POC HEMOGLOBIN A1C: 6.2 % (ref 4.2–6.5)

## 2023-12-05 PROCEDURE — 1159F MED LIST DOCD IN RCRD: CPT | Performed by: INTERNAL MEDICINE

## 2023-12-05 PROCEDURE — 3079F DIAST BP 80-89 MM HG: CPT | Performed by: INTERNAL MEDICINE

## 2023-12-05 PROCEDURE — 3051F HG A1C>EQUAL 7.0%<8.0%: CPT | Performed by: INTERNAL MEDICINE

## 2023-12-05 PROCEDURE — 99214 OFFICE O/P EST MOD 30 MIN: CPT | Performed by: INTERNAL MEDICINE

## 2023-12-05 PROCEDURE — 1126F AMNT PAIN NOTED NONE PRSNT: CPT | Performed by: INTERNAL MEDICINE

## 2023-12-05 PROCEDURE — 3077F SYST BP >= 140 MM HG: CPT | Performed by: INTERNAL MEDICINE

## 2023-12-05 PROCEDURE — 1160F RVW MEDS BY RX/DR IN RCRD: CPT | Performed by: INTERNAL MEDICINE

## 2023-12-05 PROCEDURE — 90677 PCV20 VACCINE IM: CPT | Performed by: INTERNAL MEDICINE

## 2023-12-05 PROCEDURE — 83036 HEMOGLOBIN GLYCOSYLATED A1C: CPT | Performed by: INTERNAL MEDICINE

## 2023-12-05 PROCEDURE — G0009 ADMIN PNEUMOCOCCAL VACCINE: HCPCS | Performed by: INTERNAL MEDICINE

## 2023-12-05 PROCEDURE — 1036F TOBACCO NON-USER: CPT | Performed by: INTERNAL MEDICINE

## 2023-12-05 ASSESSMENT — ENCOUNTER SYMPTOMS
FATIGUE: 0
SLEEP DISTURBANCE: 1
FEVER: 0
NERVOUS/ANXIOUS: 0
ARTHRALGIAS: 0
PALPITATIONS: 0
ABDOMINAL PAIN: 0
BACK PAIN: 0
SHORTNESS OF BREATH: 0
DIARRHEA: 0
MYALGIAS: 0
NECK PAIN: 0
FREQUENCY: 0
COUGH: 0
CONSTIPATION: 0
BLOOD IN STOOL: 0
CHILLS: 0
HEADACHES: 0
DIZZINESS: 0

## 2023-12-05 NOTE — PROGRESS NOTES
Subjective   Patient ID: Keesha Franklin is a 73 y.o. female.    HPI patient is seen today for routine follow-up.  Her medical history significant for coronary artery disease, s/p MI, history of CVA, hypertension, diabetes, hyperlipidemia, history of breast cancer.  For diabetes she is currently taking Ozempic 0.25 weekly, Lantus 26 units in the evening, Jardiance, glipizide.  She states that her morning blood sugars run slightly low around 60s to 70s.  She tries to eat a healthy diet and has lost 5 pounds.  She is not exercising regularly.  No health concerns today.    Review of Systems   Constitutional:  Negative for chills, fatigue and fever.   HENT:  Negative for congestion.    Respiratory:  Negative for cough and shortness of breath.    Cardiovascular:  Negative for chest pain, palpitations and leg swelling.   Gastrointestinal:  Negative for abdominal pain, blood in stool, constipation and diarrhea.   Endocrine: Negative for cold intolerance and heat intolerance.   Genitourinary:  Negative for frequency and urgency.   Musculoskeletal:  Negative for arthralgias, back pain, myalgias and neck pain.   Neurological:  Negative for dizziness and headaches.   Psychiatric/Behavioral:  Positive for sleep disturbance. The patient is not nervous/anxious.        Objective   Physical Exam  Vitals reviewed.   Constitutional:       General: She is not in acute distress.     Appearance: Normal appearance.   HENT:      Head: Normocephalic and atraumatic.   Cardiovascular:      Rate and Rhythm: Normal rate and regular rhythm.      Pulses: Normal pulses.   Pulmonary:      Effort: Pulmonary effort is normal. No respiratory distress.      Breath sounds: Normal breath sounds.   Abdominal:      General: Bowel sounds are normal. There is no distension.      Tenderness: There is no abdominal tenderness.   Musculoskeletal:         General: No swelling or tenderness. Normal range of motion.      Cervical back: Normal range of motion.    Skin:     General: Skin is warm.   Neurological:      General: No focal deficit present.      Mental Status: She is alert.      Coordination: Coordination normal.      Gait: Gait normal.   Psychiatric:         Mood and Affect: Mood normal.         Behavior: Behavior normal.         Assessment/Plan   Diagnoses and all orders for this visit:  Benign essential hypertension  Comments:  Blood pressure not at goal  Continue with current medications-amlodipine, hydralazine, isosorbide  Diabetes mellitus due to underlying condition with hyperosmolarity without coma, with long-term current use of insulin (CMS/[facility])  Comments:  Check hemoglobin A1c today-6.2-significantly improved  Discontinue Lantus  Continue Ozempic 0.25, Jardiance, glipizide  Orders:  -     POCT glycosylated hemoglobin (Hb A1C) manually resulted  Chronic renal impairment, stage 3a (CMS/[facility])  Comments:  Blood work ordered-patient to go to the lab today  Monitor renal function  Pneumococcal vaccination administered at current visit  Comments:  Prevnar 20 vaccine administered today  Orders:  -     Pneumococcal conjugate vaccine, 20-valent (PREVNAR 20)  Chronic systolic heart failure (CMS/[facility])  Comments:  Follow-up with cardiology  Continue with hydralazine, isosorbide, Jardiance, furosemide  Follow-up in 3 to 4 months  Exercise regularly

## 2023-12-05 NOTE — PATIENT INSTRUCTIONS
Your hemoglobin A1c today is 6.2-significant improvement  Stop taking Lantus  Continue Ozempic at current dose  Continue Jardiance and glipizide  Follow-up in 3 months

## 2024-01-22 DIAGNOSIS — M17.12 PRIMARY OSTEOARTHRITIS OF LEFT KNEE: ICD-10-CM

## 2024-01-29 ENCOUNTER — HOSPITAL ENCOUNTER (OUTPATIENT)
Dept: RADIOLOGY | Facility: CLINIC | Age: 74
Discharge: HOME | End: 2024-01-29
Payer: COMMERCIAL

## 2024-01-29 ENCOUNTER — LAB (OUTPATIENT)
Dept: LAB | Facility: LAB | Age: 74
End: 2024-01-29
Payer: COMMERCIAL

## 2024-01-29 ENCOUNTER — HOSPITAL ENCOUNTER (OUTPATIENT)
Facility: HOSPITAL | Age: 74
Setting detail: SURGERY ADMIT
End: 2024-01-29
Attending: STUDENT IN AN ORGANIZED HEALTH CARE EDUCATION/TRAINING PROGRAM | Admitting: STUDENT IN AN ORGANIZED HEALTH CARE EDUCATION/TRAINING PROGRAM
Payer: COMMERCIAL

## 2024-01-29 ENCOUNTER — HOSPITAL ENCOUNTER (OUTPATIENT)
Dept: CARDIOLOGY | Facility: CLINIC | Age: 74
Discharge: HOME | End: 2024-01-29
Payer: COMMERCIAL

## 2024-01-29 ENCOUNTER — OFFICE VISIT (OUTPATIENT)
Dept: CARDIOLOGY | Facility: CLINIC | Age: 74
End: 2024-01-29
Payer: COMMERCIAL

## 2024-01-29 VITALS
BODY MASS INDEX: 37.72 KG/M2 | SYSTOLIC BLOOD PRESSURE: 116 MMHG | HEIGHT: 60 IN | WEIGHT: 192.13 LBS | HEART RATE: 80 BPM | OXYGEN SATURATION: 93 % | DIASTOLIC BLOOD PRESSURE: 81 MMHG

## 2024-01-29 DIAGNOSIS — R53.83 OTHER FATIGUE: ICD-10-CM

## 2024-01-29 DIAGNOSIS — R06.09 DYSPNEA ON EXERTION: ICD-10-CM

## 2024-01-29 DIAGNOSIS — I48.91 ATRIAL FIBRILLATION, UNSPECIFIED TYPE (MULTI): ICD-10-CM

## 2024-01-29 DIAGNOSIS — E78.5 HYPERLIPIDEMIA, UNSPECIFIED HYPERLIPIDEMIA TYPE: ICD-10-CM

## 2024-01-29 DIAGNOSIS — I50.22 CHRONIC SYSTOLIC HEART FAILURE (MULTI): Primary | ICD-10-CM

## 2024-01-29 DIAGNOSIS — R60.9 EDEMA, UNSPECIFIED TYPE: ICD-10-CM

## 2024-01-29 DIAGNOSIS — I25.10 2-VESSEL CORONARY ARTERY DISEASE: ICD-10-CM

## 2024-01-29 DIAGNOSIS — R06.83 SNORING: ICD-10-CM

## 2024-01-29 DIAGNOSIS — R06.09 DOE (DYSPNEA ON EXERTION): ICD-10-CM

## 2024-01-29 DIAGNOSIS — I10 BENIGN ESSENTIAL HYPERTENSION: ICD-10-CM

## 2024-01-29 PROBLEM — I21.4 NON-ST ELEVATION (NSTEMI) MYOCARDIAL INFARCTION (MULTI): Status: RESOLVED | Noted: 2023-07-17 | Resolved: 2024-01-29

## 2024-01-29 PROBLEM — M17.12 PRIMARY OSTEOARTHRITIS OF LEFT KNEE: Status: ACTIVE | Noted: 2024-01-22

## 2024-01-29 PROBLEM — I25.5 ISCHEMIC DILATED CARDIOMYOPATHY (MULTI): Status: RESOLVED | Noted: 2023-07-17 | Resolved: 2024-01-29

## 2024-01-29 PROBLEM — I42.0 ISCHEMIC DILATED CARDIOMYOPATHY (MULTI): Status: RESOLVED | Noted: 2023-07-17 | Resolved: 2024-01-29

## 2024-01-29 LAB
ERYTHROCYTE [DISTWIDTH] IN BLOOD BY AUTOMATED COUNT: 15.9 % (ref 11.5–14.5)
HCT VFR BLD AUTO: 42.2 % (ref 36–46)
HGB BLD-MCNC: 13.7 G/DL (ref 12–16)
MCH RBC QN AUTO: 28.7 PG (ref 26–34)
MCHC RBC AUTO-ENTMCNC: 32.5 G/DL (ref 32–36)
MCV RBC AUTO: 89 FL (ref 80–100)
NRBC BLD-RTO: 0 /100 WBCS (ref 0–0)
PLATELET # BLD AUTO: 196 X10*3/UL (ref 150–450)
RBC # BLD AUTO: 4.77 X10*6/UL (ref 4–5.2)
WBC # BLD AUTO: 5.4 X10*3/UL (ref 4.4–11.3)

## 2024-01-29 PROCEDURE — 99215 OFFICE O/P EST HI 40 MIN: CPT | Performed by: INTERNAL MEDICINE

## 2024-01-29 PROCEDURE — 84443 ASSAY THYROID STIM HORMONE: CPT

## 2024-01-29 PROCEDURE — 83880 ASSAY OF NATRIURETIC PEPTIDE: CPT

## 2024-01-29 PROCEDURE — 83735 ASSAY OF MAGNESIUM: CPT

## 2024-01-29 PROCEDURE — 36415 COLL VENOUS BLD VENIPUNCTURE: CPT

## 2024-01-29 PROCEDURE — 71046 X-RAY EXAM CHEST 2 VIEWS: CPT | Performed by: RADIOLOGY

## 2024-01-29 PROCEDURE — 80053 COMPREHEN METABOLIC PANEL: CPT

## 2024-01-29 PROCEDURE — 1036F TOBACCO NON-USER: CPT | Performed by: INTERNAL MEDICINE

## 2024-01-29 PROCEDURE — 71046 X-RAY EXAM CHEST 2 VIEWS: CPT

## 2024-01-29 PROCEDURE — 93244 EXT ECG>48HR<7D REV&INTERPJ: CPT | Performed by: INTERNAL MEDICINE

## 2024-01-29 PROCEDURE — 93010 ELECTROCARDIOGRAM REPORT: CPT | Performed by: INTERNAL MEDICINE

## 2024-01-29 PROCEDURE — 93005 ELECTROCARDIOGRAM TRACING: CPT | Mod: 59 | Performed by: INTERNAL MEDICINE

## 2024-01-29 PROCEDURE — 1126F AMNT PAIN NOTED NONE PRSNT: CPT | Performed by: INTERNAL MEDICINE

## 2024-01-29 PROCEDURE — 85027 COMPLETE CBC AUTOMATED: CPT

## 2024-01-29 PROCEDURE — 1159F MED LIST DOCD IN RCRD: CPT | Performed by: INTERNAL MEDICINE

## 2024-01-29 PROCEDURE — 3079F DIAST BP 80-89 MM HG: CPT | Performed by: INTERNAL MEDICINE

## 2024-01-29 PROCEDURE — 93242 EXT ECG>48HR<7D RECORDING: CPT

## 2024-01-29 PROCEDURE — 3074F SYST BP LT 130 MM HG: CPT | Performed by: INTERNAL MEDICINE

## 2024-01-29 RX ORDER — FUROSEMIDE 20 MG/1
20 TABLET ORAL DAILY
Qty: 90 TABLET | Refills: 1 | Status: SHIPPED | OUTPATIENT
Start: 2024-01-29 | End: 2024-01-30 | Stop reason: SDUPTHER

## 2024-01-29 ASSESSMENT — ENCOUNTER SYMPTOMS
MEMORY LOSS: 0
CHILLS: 0
DEPRESSION: 1
MYALGIAS: 0
ALTERED MENTAL STATUS: 0
FALLS: 0
NAUSEA: 0
DEPRESSION: 0
VOMITING: 0
HEMOPTYSIS: 0
LOSS OF SENSATION IN FEET: 0
HEMATURIA: 0
HEADACHES: 0
DYSURIA: 0
OCCASIONAL FEELINGS OF UNSTEADINESS: 0
WHEEZING: 0
BLOATING: 0
DIARRHEA: 0
ABDOMINAL PAIN: 0
CONSTIPATION: 0
FEVER: 0
COUGH: 0

## 2024-01-29 ASSESSMENT — COLUMBIA-SUICIDE SEVERITY RATING SCALE - C-SSRS
6. HAVE YOU EVER DONE ANYTHING, STARTED TO DO ANYTHING, OR PREPARED TO DO ANYTHING TO END YOUR LIFE?: NO
2. HAVE YOU ACTUALLY HAD ANY THOUGHTS OF KILLING YOURSELF?: NO
1. IN THE PAST MONTH, HAVE YOU WISHED YOU WERE DEAD OR WISHED YOU COULD GO TO SLEEP AND NOT WAKE UP?: NO

## 2024-01-29 ASSESSMENT — PAIN SCALES - GENERAL: PAINLEVEL: 0-NO PAIN

## 2024-01-29 NOTE — PROGRESS NOTES
Chief complaint:  Shortness of Breath     HPI  74 yo BF w/ h/o CAD s/p D2 POBA 1/19 (LAD ), s/p MI 1/19, HTN, HLD, DM, CVA 9/17 (no resid), breast Ca s/p mast now here for cardiology FU. Over the past week, she is having ARANDA/orthop/PND/mild edema/insomnia.   No chest pain. No palps. +occ brief LH on standing up. No syncope. No claudication. No cough. +occ fatigue. +snoring. +occ RUE cramp x seconds.   ECG 6/22: SR (74), PACs, RBBB, LAFB  ECG 9/23: SR (75), PACs, RBBB, LAFB, ?LMI  ECG 1/24: AFIB (62), RBBB, LAFB, ?ALMI  HM 11/17: SR, HR  (avg 76), SVT x29 (long 23s)  Echo 6/21: EF 55-60%, pseudonl/DD, mod LAE, mild MR  Stress cMRI 3/19: EF 39%, LAD scar/ischemia, nl RV  Cath 1/19; mLAD 100%, pD2 100% (POBA)  Cath 6/21: pLAD 30%, p-mLAD 30%, mLAD 100% (L->L collat), o-pD2 99%, LVEDP 18-21, no AS  CTA chest 4/19: no PE, nl PA, mild CAC, nl heart size, no peric eff, mild AA, no aneurysm  MRI brain 9/17: acute infarct L front operculum  MRA head/neck 9/17: BICA sig loss (?artifact)     Review of Systems   Constitutional: Negative for chills, fever and malaise/fatigue.   HENT:  Negative for hearing loss.    Eyes:  Negative for visual disturbance.   Respiratory:  Negative for cough, hemoptysis and wheezing.    Skin:  Negative for rash.   Musculoskeletal:  Negative for falls and myalgias.   Gastrointestinal:  Negative for bloating, abdominal pain, constipation, diarrhea, dysphagia, nausea and vomiting.   Genitourinary:  Negative for dysuria and hematuria.   Neurological:  Negative for headaches.   Psychiatric/Behavioral:  Negative for altered mental status, depression and memory loss.         Social History     Tobacco Use    Smoking status: Never    Smokeless tobacco: Never   Substance Use Topics    Alcohol use: Not Currently     Alcohol/week: 1.0 standard drink of alcohol     Types: 1 Shots of liquor per week        Family History   Problem Relation Name Age of Onset    Hypertension Mother      Coronary artery  "disease Father      Other (cardiac disorder) Father      Hypertension Sister      COPD Sister      COPD Brother          Allergies   Allergen Reactions    Lisinopril Angioedema and Unknown        Current Outpatient Medications   Medication Instructions    amLODIPine (Norvasc) 5 mg tablet 1 tablet, oral, Daily    aspirin 81 mg EC tablet 1 tablet, oral, Daily    atorvastatin (Lipitor) 80 mg tablet TAKE 1 TABLET BY MOUTH EVERYDAY AT BEDTIME    blood sugar diagnostic (Accu-Chek Tiffanie Plus test strp) strip USE TO TEST THREE TIMES A DAY    blood-glucose sensor (Dexcom G7 Sensor) device 1 kit, miscellaneous, Daily    escitalopram (Lexapro) 10 mg tablet TAKE 1 TABLET BY MOUTH EVERY DAY    gabapentin (Neurontin) 100 mg capsule oral    glipiZIDE XL (Glucotrol XL) 10 mg 24 hr tablet oral, 2 times daily    isosorbide mononitrate ER (Imdur) 60 mg 24 hr tablet TAKE 1 TABLET BY MOUTH ONCE DAILY.    Jardiance 10 mg, oral, Daily    Lantus Solostar U-100 Insulin 100 unit/mL (3 mL) pen INJECT 18-20 UNITS SUBCUTANEOUSLY DAILY    metoprolol succinate XL (TOPROL-XL) 12.5 mg, oral, Nightly    nitroglycerin (NITROSTAT) 0.4 mg, sublingual, Every 5 min PRN, Up to 3 times.    pen needle, diabetic (BD Ultra-Fine Sameera Pen Needle) 32 gauge x 5/32\" needle USE AS DIRECTED TO TEST THREE TIMES A DAY    semaglutide 0.25 mg, subcutaneous, Weekly    sodium hyaluronate (Durolane) 60 mg/3 mL injection intra-articular    valsartan-hydrochlorothiazide (Diovan-HCT) 160-25 mg tablet TAKE ONE TABLET BY MOUTH ONCE A DAY         Vitals:    01/29/24 1146   BP: 148/85   Pulse: 80   SpO2: 93%      Vitals:    01/29/24 1211   BP: 116/81   Pulse:    SpO2:         Physical Exam  Constitutional:       Appearance: Normal appearance.   HENT:      Head: Normocephalic and atraumatic.      Nose: Nose normal.   Neck:      Vascular: JVD present. No carotid bruit.   Cardiovascular:      Rate and Rhythm: Normal rate. Rhythm irregular.      Heart sounds: No murmur " heard.  Pulmonary:      Effort: Pulmonary effort is normal.      Breath sounds: Normal breath sounds.   Abdominal:      Palpations: Abdomen is soft.      Tenderness: There is no abdominal tenderness.   Musculoskeletal:      Right lower leg: Edema present.      Left lower leg: Edema present.      Comments: Trivial LE edema   Skin:     General: Skin is warm and dry.   Neurological:      General: No focal deficit present.      Mental Status: She is alert.   Psychiatric:         Mood and Affect: Mood normal.         Judgment: Judgment normal.        Results/Data  8/23 Cr 1.3, K 3.7, LFT nl, hgba1c 7.2  12/22 Cr 1.21, K 3.9, LFT nl, LDL 71, HDL 56, TG 88, Chol 145, HGB 13, , hgba1c 8.8, TSH 3.44  1/19 TPN 11     Assessment/Plan   74 yo BF w/ h/o CAD s/p D2 POBA 1/19 (LAD ), s/p MI 1/19, HTN, HLD, DM, CVA 9/17 (no resid), breast Ca s/p mast now w/ new AFIB + likely CHF (ARANDA/orthop/PND/mild edema/insomnia). Check labs, CXR, echo, 1wk HM and sleep study. Add OAC and Lasix. Likely will need DCCV pending test results.  -continue ASA 81 every day  -add Xarelto 20 every day (CrCl 51)  -continue Metoprolol Succinate 50 qd  -continue Valsartan-HCTZ 160-25 qd (can increase Valsartan if needed for HTN)  -continue Amlodipine 5 qd   -continue Imdur 60 every day  -add Lasix 20 qd  -continue Atorva 80 qhs  -f/u 2 months (earlier if needed)     Juancarlos Browning MD

## 2024-01-30 ENCOUNTER — TELEPHONE (OUTPATIENT)
Dept: CARDIOLOGY | Facility: CLINIC | Age: 74
End: 2024-01-30
Payer: COMMERCIAL

## 2024-01-30 DIAGNOSIS — I50.22 CHRONIC SYSTOLIC HEART FAILURE (MULTI): ICD-10-CM

## 2024-01-30 DIAGNOSIS — I50.22 CHRONIC SYSTOLIC HEART FAILURE (MULTI): Primary | ICD-10-CM

## 2024-01-30 DIAGNOSIS — I48.91 ATRIAL FIBRILLATION, UNSPECIFIED TYPE (MULTI): ICD-10-CM

## 2024-01-30 LAB
ALBUMIN SERPL BCP-MCNC: 4.4 G/DL (ref 3.4–5)
ALP SERPL-CCNC: 90 U/L (ref 33–136)
ALT SERPL W P-5'-P-CCNC: 47 U/L (ref 7–45)
ANION GAP SERPL CALC-SCNC: 19 MMOL/L (ref 10–20)
AST SERPL W P-5'-P-CCNC: 27 U/L (ref 9–39)
BILIRUB SERPL-MCNC: 0.9 MG/DL (ref 0–1.2)
BNP SERPL-MCNC: 860 PG/ML (ref 0–99)
BUN SERPL-MCNC: 28 MG/DL (ref 6–23)
CALCIUM SERPL-MCNC: 10.3 MG/DL (ref 8.6–10.6)
CHLORIDE SERPL-SCNC: 101 MMOL/L (ref 98–107)
CO2 SERPL-SCNC: 25 MMOL/L (ref 21–32)
CREAT SERPL-MCNC: 1.34 MG/DL (ref 0.5–1.05)
EGFRCR SERPLBLD CKD-EPI 2021: 41 ML/MIN/1.73M*2
GLUCOSE SERPL-MCNC: 204 MG/DL (ref 74–99)
MAGNESIUM SERPL-MCNC: 1.96 MG/DL (ref 1.6–2.4)
POTASSIUM SERPL-SCNC: 3.7 MMOL/L (ref 3.5–5.3)
PROT SERPL-MCNC: 7.4 G/DL (ref 6.4–8.2)
SODIUM SERPL-SCNC: 141 MMOL/L (ref 136–145)
TSH SERPL-ACNC: 3.61 MIU/L (ref 0.44–3.98)

## 2024-01-30 RX ORDER — POTASSIUM CHLORIDE 750 MG/1
10 TABLET, FILM COATED, EXTENDED RELEASE ORAL DAILY
Qty: 90 TABLET | Refills: 3 | Status: SHIPPED | OUTPATIENT
Start: 2024-01-30 | End: 2024-01-30 | Stop reason: SDUPTHER

## 2024-01-30 RX ORDER — FUROSEMIDE 20 MG/1
20 TABLET ORAL DAILY
Qty: 90 TABLET | Refills: 3 | Status: SHIPPED | OUTPATIENT
Start: 2024-01-30 | End: 2024-02-26 | Stop reason: SDUPTHER

## 2024-01-30 RX ORDER — POTASSIUM CHLORIDE 750 MG/1
10 TABLET, FILM COATED, EXTENDED RELEASE ORAL DAILY
Qty: 90 TABLET | Refills: 3 | Status: SHIPPED | OUTPATIENT
Start: 2024-01-30 | End: 2025-01-29

## 2024-01-30 NOTE — TELEPHONE ENCOUNTER
----- Message from Juancarlos Browning MD sent at 1/30/2024 12:41 PM EST -----  Notify pt labs showed fluid overload as expected. We just added Lasix. K is on lower side which will drop more with Lasix. So add KCL daily (I prescribed)

## 2024-01-30 NOTE — TELEPHONE ENCOUNTER
Result Communication    Resulted Orders   Comprehensive metabolic panel   Result Value Ref Range    Glucose 204 (H) 74 - 99 mg/dL    Sodium 141 136 - 145 mmol/L    Potassium 3.7 3.5 - 5.3 mmol/L    Chloride 101 98 - 107 mmol/L    Bicarbonate 25 21 - 32 mmol/L    Anion Gap 19 10 - 20 mmol/L    Urea Nitrogen 28 (H) 6 - 23 mg/dL    Creatinine 1.34 (H) 0.50 - 1.05 mg/dL    eGFR 41 (L) >60 mL/min/1.73m*2      Comment:      Calculations of estimated GFR are performed using the 2021 CKD-EPI Study Refit equation without the race variable for the IDMS-Traceable creatinine methods.  https://jasn.asnjournals.org/content/early/2021/09/22/ASN.0239159932    Calcium 10.3 8.6 - 10.6 mg/dL    Albumin 4.4 3.4 - 5.0 g/dL    Alkaline Phosphatase 90 33 - 136 U/L    Total Protein 7.4 6.4 - 8.2 g/dL    AST 27 9 - 39 U/L    Bilirubin, Total 0.9 0.0 - 1.2 mg/dL    ALT 47 (H) 7 - 45 U/L      Comment:      Patients treated with Sulfasalazine may generate falsely decreased results for ALT.   CBC   Result Value Ref Range    WBC 5.4 4.4 - 11.3 x10*3/uL    nRBC 0.0 0.0 - 0.0 /100 WBCs    RBC 4.77 4.00 - 5.20 x10*6/uL    Hemoglobin 13.7 12.0 - 16.0 g/dL    Hematocrit 42.2 36.0 - 46.0 %    MCV 89 80 - 100 fL    MCH 28.7 26.0 - 34.0 pg    MCHC 32.5 32.0 - 36.0 g/dL    RDW 15.9 (H) 11.5 - 14.5 %    Platelets 196 150 - 450 x10*3/uL   TSH with reflex to Free T4 if abnormal   Result Value Ref Range    Thyroid Stimulating Hormone 3.61 0.44 - 3.98 mIU/L    Narrative    TSH testing is performed using different testing methodology at [facility] than at other [facility] hospitals. Direct result comparisons should only be made within the same method.     B-Type Natriuretic Peptide   Result Value Ref Range     (H) 0 - 99 pg/mL    Narrative       <100 pg/mL - Heart failure unlikely  100-299 pg/mL - Intermediate probability of acute heart                  failure exacerbation. Correlate with clinical                  context and patient  history.    >=300 pg/mL - Heart Failure likely. Correlate with clinical                  context and patient history.     Biotin interference may cause falsely decreased results. Patients taking a Biotin dose of up to 5 mg/day should refrain from taking Biotin for 24 hours before sample  collection. Providers may contact their local laboratory for further information.   Magnesium   Result Value Ref Range    Magnesium 1.96 1.60 - 2.40 mg/dL       2:41 PM    Phoned patient and provided results and plan of care per Dr. Nam gomes.  Patient verbalized understanding.  Patient provided update medications not yet obtained, patient  requests medications sent to [facility] in [place].  Pharmacy updated in [place], provider updated regarding refills.  Provided patient contact number for nursing office ([phone]) for any needs in the interim.

## 2024-01-30 NOTE — TELEPHONE ENCOUNTER
Returned call to patient and spoke to patient.  Patient has not yet obtained prescribed furosemide, potassium or Xarelto and requests these medications sent to preferred pharmacy of [facility] in [place].  Reviewed with patient Xarelto discount card and patient verbalized understanding.  Provided patient contact number for cardiology nursing office ([phone]) and requested patient contact nursing office for further concerns with refills and patient verbalized understanding.

## 2024-01-31 ENCOUNTER — TELEPHONE (OUTPATIENT)
Dept: CARDIOLOGY | Facility: CLINIC | Age: 74
End: 2024-01-31
Payer: COMMERCIAL

## 2024-01-31 DIAGNOSIS — I42.9 CARDIOMYOPATHY, UNSPECIFIED TYPE (MULTI): ICD-10-CM

## 2024-01-31 DIAGNOSIS — I48.91 ATRIAL FIBRILLATION, UNSPECIFIED TYPE (MULTI): Primary | ICD-10-CM

## 2024-01-31 DIAGNOSIS — I51.89 RIGHT ATRIAL MASS: ICD-10-CM

## 2024-01-31 RX ORDER — DABIGATRAN ETEXILATE 150 MG/1
150 CAPSULE ORAL 2 TIMES DAILY
Qty: 180 CAPSULE | Refills: 1 | Status: SHIPPED | OUTPATIENT
Start: 2024-01-31 | End: 2024-02-12 | Stop reason: WASHOUT

## 2024-01-31 NOTE — TELEPHONE ENCOUNTER
Patient notified of normal CXR results.   Patient requested Dr. Browning be notified, her copay for Xarelto is $600 and she can not afford it. Will forward message to Dr. Browning for review.

## 2024-01-31 NOTE — TELEPHONE ENCOUNTER
Spoke with patient, notified scripts sent to local pharmacy for eliquis and pradaxa and see which one maybe more affordable. Patient notified Dr. Browning referred her case to the clinical pharmacy and she will be receiving a call from the  clinical pharmacy in regards to her anticoagulant and financial assistance.

## 2024-01-31 NOTE — TELEPHONE ENCOUNTER
----- Message from Juancarlos Browning MD sent at 1/31/2024 10:37 AM EST -----  Notify pt CXR was normal

## 2024-02-01 DIAGNOSIS — I48.0 PAROXYSMAL ATRIAL FIBRILLATION (MULTI): Primary | ICD-10-CM

## 2024-02-01 LAB
ATRIAL RATE: 340 BPM
P OFFSET: 172 MS
P ONSET: 121 MS
PR INTERVAL: 148 MS
Q ONSET: 195 MS
QRS COUNT: 10 BEATS
QRS DURATION: 130 MS
QT INTERVAL: 440 MS
QTC CALCULATION(BAZETT): 446 MS
QTC FREDERICIA: 445 MS
R AXIS: -65 DEGREES
T AXIS: 74 DEGREES
T OFFSET: 415 MS
VENTRICULAR RATE: 62 BPM

## 2024-02-01 NOTE — TELEPHONE ENCOUNTER
Called and spoke with pt. pt information given to [facility] clinic by secure chat with Abigail Coto for assistance with Eliquis. Pt states she is taking Eliquis.

## 2024-02-01 NOTE — TELEPHONE ENCOUNTER
Called pt to see if he was able to get her medication or if we need to refer her to the [facility] clinic. Her VM box was full so I was unable to leave a message.

## 2024-02-07 ENCOUNTER — HOSPITAL ENCOUNTER (OUTPATIENT)
Dept: CARDIOLOGY | Facility: CLINIC | Age: 74
Discharge: HOME | End: 2024-02-07
Payer: COMMERCIAL

## 2024-02-07 DIAGNOSIS — I50.22 CHRONIC SYSTOLIC HEART FAILURE (MULTI): ICD-10-CM

## 2024-02-07 DIAGNOSIS — R60.9 EDEMA, UNSPECIFIED TYPE: ICD-10-CM

## 2024-02-07 DIAGNOSIS — I48.91 ATRIAL FIBRILLATION, UNSPECIFIED TYPE (MULTI): ICD-10-CM

## 2024-02-07 DIAGNOSIS — R06.09 DOE (DYSPNEA ON EXERTION): ICD-10-CM

## 2024-02-07 LAB
AORTIC VALVE PEAK VELOCITY: 0.91 M/S
AV PEAK GRADIENT: 3.3 MMHG
AVA (PEAK VEL): 2.21 CM2
EJECTION FRACTION APICAL 4 CHAMBER: 35.2
EJECTION FRACTION: 31 %
LEFT ATRIUM VOLUME AREA LENGTH INDEX BSA: 38 ML/M2
LEFT VENTRICLE INTERNAL DIMENSION DIASTOLE: 4.1 CM (ref 3.5–6)
LEFT VENTRICULAR OUTFLOW TRACT DIAMETER: 1.97 CM
MITRAL VALVE E/A RATIO: 1.05
MITRAL VALVE E/E' RATIO: 15.33
RIGHT VENTRICLE FREE WALL PEAK S': 7 CM/S
RIGHT VENTRICLE PEAK SYSTOLIC PRESSURE: 30.7 MMHG
TRICUSPID ANNULAR PLANE SYSTOLIC EXCURSION: 1.8 CM

## 2024-02-07 PROCEDURE — 93306 TTE W/DOPPLER COMPLETE: CPT

## 2024-02-07 PROCEDURE — 2500000004 HC RX 250 GENERAL PHARMACY W/ HCPCS (ALT 636 FOR OP/ED): Performed by: INTERNAL MEDICINE

## 2024-02-07 PROCEDURE — 93306 TTE W/DOPPLER COMPLETE: CPT | Performed by: SPECIALIST

## 2024-02-07 RX ADMIN — PERFLUTREN 10 ML OF DILUTION: 6.52 INJECTION, SUSPENSION INTRAVENOUS at 13:46

## 2024-02-08 NOTE — PROGRESS NOTES
"Pharmacist Clinic: Anticoagulation Management  Keesha Franklin was referred to the Clinical Pharmacy Team for her anticoagulation management.    Referring Provider:  Dr. Browning  _______________________________________________________________________  PHARMACY ASSESSMENT    Affordability/Accessibility: Patient is newly started in Eliquis. When she went to pharmacy, it was going to be $600/3 months. Cannot afford on fixed income.   Adherence/Organization: 5 mg BID  Adverse Effects: Patient has only been on Eliquis for about one week ago. Patient does notice she is urinating a lot in the morning     MEDICATION RECONCILIATION  Added:  - NA  Changed:  - NA  Removed:  - Pradaxa  - Xarelto     RELEVANT LAB RESULTS  Lab Results   Component Value Date    BILITOT 0.9 01/29/2024    CALCIUM 10.3 01/29/2024    CO2 25 01/29/2024     01/29/2024    CREATININE 1.34 (H) 01/29/2024    GLUCOSE 204 (H) 01/29/2024    ALKPHOS 90 01/29/2024    K 3.7 01/29/2024    PROT 7.4 01/29/2024     01/29/2024    AST 27 01/29/2024    ALT 47 (H) 01/29/2024    BUN 28 (H) 01/29/2024    ANIONGAP 19 01/29/2024    MG 1.96 01/29/2024    ALBUMIN 4.4 01/29/2024    GFRF 43 (A) 08/16/2023     Lab Results   Component Value Date    TRIG 88 12/07/2022    CHOL 145 12/07/2022    HDL 56.3 12/07/2022     No results found for: \"BMCBC\", \"CBCDIF\"       DRUG INTERACTIONS  - No  _______________________________________________________________________  ANTICOAGULATION ASSESSMENT    The ASCVD Risk score (Tanya DALTON, et al., 2019) failed to calculate for the following reasons:    The patient has a prior MI or stroke diagnosis    DIAGNOSIS: prevention of nonvalvular atrial fibrilliation stroke and systemic embolism  - Patient is projected to be on anticoagulation indefinitely   - [id number] Score: [9] (only included if diagnosis is atrial fibrillation)   Age: [<65 (0)] [65-74 (+1)] [> 75 (+2)]: 2  Sex: [Male/Female (+1)]: 1  CHF history: [No/Yes(+1)]: 1  Hypertension " history: [No/Yes(+1)]: 1  Stroke/TIA/thromboembolism history: [No/Yes(+2)]: 2  Vascular disease history (prior MI, peripheral artery disease, aortic plaque): [No/Yes(+1)]: 1  Diabetes history: [No/Yes(+1)]: 1    CURRENT PHARMACOTHERAPY:   - Eliquis 5 mg BID  - 72 yo   - 87.1 kg   - Scr 1.34    REVIEW OF PHARMACOTHERAPY/MEDICAL HISTORY  - Patient has significant past medical history including CAD, hx MI (1/2019), HTN, HLD, DM, CVA (9/2017), HTNm, HLD, CHF, and a fib.     PERTINANT MEDICAL HISTORY:  - Medical history: CAD, hx MI (1/2019), HTN, HLD, DM, CVA (9/2017), HTNm, HLD, CHF, and a fib  _______________________________________________________________________  PATIENT EDUCATION/GOALS  - Counseled patient on MOA, expectations, duration of therapy, contraindications, administration, and monitoring parameters  - Counseled patient of side effects that are indicative of bleeding such as dark tarry stool, unexplainable bruising, or vomiting up a coffee ground like substance  - Answered all patient questions and concerns    _______________________________________________________________________  RECOMMENDATIONS/PLAN     Patient Assistance Program (PAP)    Patient verbally reports monthly or yearly income which is less than 400% federal poverty level    Application for program to be submitted for the following medications: Eliquis    Prescription Insurance: Yes  Members of Household: 1  Files Taxes: No    Patient will be faxing financial information to pharmacist directly at [phone].    Patient aware this process may take up to 6 weeks.     If approved medication must be filled through [facility] pharmacy and mailed to patient.      1. CONTINUE Eliquis 5 mg BID - rts 4/8/2024  2.  PAP application sent 2/28  3. I reached out to PCP to see if they would refer to PCP pharmacists to help with Ozempic coverage.     Next Cardiology Appointment: 4/1/2024  Clinical Pharmacist follow up: TBD - RTS 4/8  Type of Encounter:  Virtual    Abigail Coto PharmD    Verbal consent to manage patient's drug therapy was obtained from the patient . They were informed they may decline to participate or withdraw from participation in pharmacy services at any time.    Continue all meds under the continuation of care with the referring provider and clinical pharmacy team.

## 2024-02-12 ENCOUNTER — TELEMEDICINE (OUTPATIENT)
Dept: PHARMACY | Facility: HOSPITAL | Age: 74
End: 2024-02-12
Payer: COMMERCIAL

## 2024-02-12 ENCOUNTER — TELEPHONE (OUTPATIENT)
Dept: PREADMISSION TESTING | Facility: HOSPITAL | Age: 74
End: 2024-02-12

## 2024-02-12 DIAGNOSIS — Z01.810 PREOP CARDIOVASCULAR EXAM: ICD-10-CM

## 2024-02-12 DIAGNOSIS — Z79.4 DIABETES MELLITUS DUE TO UNDERLYING CONDITION WITH HYPEROSMOLARITY WITHOUT COMA, WITH LONG-TERM CURRENT USE OF INSULIN (MULTI): Primary | ICD-10-CM

## 2024-02-12 DIAGNOSIS — E08.00 DIABETES MELLITUS DUE TO UNDERLYING CONDITION WITH HYPEROSMOLARITY WITHOUT COMA, WITH LONG-TERM CURRENT USE OF INSULIN (MULTI): Primary | ICD-10-CM

## 2024-02-12 DIAGNOSIS — I48.91 ATRIAL FIBRILLATION, UNSPECIFIED TYPE (MULTI): ICD-10-CM

## 2024-02-12 DIAGNOSIS — I42.9 CARDIOMYOPATHY, UNSPECIFIED TYPE (MULTI): Primary | ICD-10-CM

## 2024-02-12 RX ORDER — REGADENOSON 0.08 MG/ML
0.4 INJECTION, SOLUTION INTRAVENOUS
Status: CANCELLED | OUTPATIENT
Start: 2024-02-12

## 2024-02-12 NOTE — Clinical Note
Beto Browning! I am going to try to get Keesha signed up fro  PAP for her Eliquis. Will continue to follow.

## 2024-02-15 ENCOUNTER — APPOINTMENT (OUTPATIENT)
Dept: PHARMACY | Facility: HOSPITAL | Age: 74
End: 2024-02-15
Payer: COMMERCIAL

## 2024-02-16 LAB — BODY SURFACE AREA: 1.92 M2

## 2024-02-18 ENCOUNTER — HOSPITAL ENCOUNTER (EMERGENCY)
Facility: HOSPITAL | Age: 74
Discharge: HOME | End: 2024-02-18
Attending: STUDENT IN AN ORGANIZED HEALTH CARE EDUCATION/TRAINING PROGRAM
Payer: COMMERCIAL

## 2024-02-18 VITALS
HEART RATE: 93 BPM | WEIGHT: 192 LBS | RESPIRATION RATE: 16 BRPM | SYSTOLIC BLOOD PRESSURE: 131 MMHG | DIASTOLIC BLOOD PRESSURE: 67 MMHG | OXYGEN SATURATION: 97 % | TEMPERATURE: 97.7 F | BODY MASS INDEX: 30.13 KG/M2 | HEIGHT: 67 IN

## 2024-02-18 DIAGNOSIS — E11.9 TYPE 2 DIABETES MELLITUS WITHOUT COMPLICATION, WITH LONG-TERM CURRENT USE OF INSULIN (MULTI): ICD-10-CM

## 2024-02-18 DIAGNOSIS — N39.0 ACUTE UTI: Primary | ICD-10-CM

## 2024-02-18 DIAGNOSIS — Z79.4 TYPE 2 DIABETES MELLITUS WITHOUT COMPLICATION, WITH LONG-TERM CURRENT USE OF INSULIN (MULTI): ICD-10-CM

## 2024-02-18 DIAGNOSIS — R73.9 HYPERGLYCEMIA: ICD-10-CM

## 2024-02-18 LAB
ALBUMIN SERPL BCP-MCNC: 4.2 G/DL (ref 3.4–5)
ALP SERPL-CCNC: 90 U/L (ref 33–136)
ALT SERPL W P-5'-P-CCNC: 28 U/L (ref 7–45)
ANION GAP SERPL CALC-SCNC: 15 MMOL/L (ref 10–20)
APPEARANCE UR: ABNORMAL
AST SERPL W P-5'-P-CCNC: 30 U/L (ref 9–39)
B-OH-BUTYR SERPL-SCNC: 0.35 MMOL/L (ref 0.02–0.27)
BASE EXCESS BLDV CALC-SCNC: 8 MMOL/L (ref -2–3)
BASOPHILS # BLD AUTO: 0.03 X10*3/UL (ref 0–0.1)
BASOPHILS NFR BLD AUTO: 0.5 %
BILIRUB SERPL-MCNC: 0.6 MG/DL (ref 0–1.2)
BILIRUB UR STRIP.AUTO-MCNC: NEGATIVE MG/DL
BODY TEMPERATURE: 37 DEGREES CELSIUS
BUN SERPL-MCNC: 45 MG/DL (ref 6–23)
CALCIUM SERPL-MCNC: 9.7 MG/DL (ref 8.6–10.3)
CHLORIDE SERPL-SCNC: 88 MMOL/L (ref 98–107)
CO2 SERPL-SCNC: 28 MMOL/L (ref 21–32)
COLOR UR: YELLOW
CREAT SERPL-MCNC: 1.78 MG/DL (ref 0.5–1.05)
EGFRCR SERPLBLD CKD-EPI 2021: 29 ML/MIN/1.73M*2
EOSINOPHIL # BLD AUTO: 0.11 X10*3/UL (ref 0–0.4)
EOSINOPHIL NFR BLD AUTO: 1.9 %
ERYTHROCYTE [DISTWIDTH] IN BLOOD BY AUTOMATED COUNT: 13.9 % (ref 11.5–14.5)
GLUCOSE BLD MANUAL STRIP-MCNC: 365 MG/DL (ref 74–99)
GLUCOSE BLD MANUAL STRIP-MCNC: 475 MG/DL (ref 74–99)
GLUCOSE SERPL-MCNC: 466 MG/DL (ref 74–99)
GLUCOSE UR STRIP.AUTO-MCNC: ABNORMAL MG/DL
HCO3 BLDV-SCNC: 34.7 MMOL/L (ref 22–26)
HCT VFR BLD AUTO: 42.8 % (ref 36–46)
HGB BLD-MCNC: 13.6 G/DL (ref 12–16)
IMM GRANULOCYTES # BLD AUTO: 0.03 X10*3/UL (ref 0–0.5)
IMM GRANULOCYTES NFR BLD AUTO: 0.5 % (ref 0–0.9)
INHALED O2 CONCENTRATION: 21 %
KETONES UR STRIP.AUTO-MCNC: NEGATIVE MG/DL
LEUKOCYTE ESTERASE UR QL STRIP.AUTO: ABNORMAL
LYMPHOCYTES # BLD AUTO: 1.64 X10*3/UL (ref 0.8–3)
LYMPHOCYTES NFR BLD AUTO: 29 %
MAGNESIUM SERPL-MCNC: 3 MG/DL (ref 1.6–2.4)
MCH RBC QN AUTO: 27.6 PG (ref 26–34)
MCHC RBC AUTO-ENTMCNC: 31.8 G/DL (ref 32–36)
MCV RBC AUTO: 87 FL (ref 80–100)
MONOCYTES # BLD AUTO: 0.55 X10*3/UL (ref 0.05–0.8)
MONOCYTES NFR BLD AUTO: 9.7 %
NEUTROPHILS # BLD AUTO: 3.3 X10*3/UL (ref 1.6–5.5)
NEUTROPHILS NFR BLD AUTO: 58.4 %
NITRITE UR QL STRIP.AUTO: POSITIVE
NRBC BLD-RTO: 0 /100 WBCS (ref 0–0)
OXYHGB MFR BLDV: 28.4 % (ref 45–75)
PCO2 BLDV: 56 MM HG (ref 41–51)
PH BLDV: 7.4 PH (ref 7.33–7.43)
PH UR STRIP.AUTO: 5 [PH]
PLATELET # BLD AUTO: 187 X10*3/UL (ref 150–450)
PO2 BLDV: 22 MM HG (ref 35–45)
POTASSIUM SERPL-SCNC: 3.5 MMOL/L (ref 3.5–5.3)
PROT SERPL-MCNC: 7.5 G/DL (ref 6.4–8.2)
PROT UR STRIP.AUTO-MCNC: NEGATIVE MG/DL
RBC # BLD AUTO: 4.93 X10*6/UL (ref 4–5.2)
RBC # UR STRIP.AUTO: NEGATIVE /UL
RBC #/AREA URNS AUTO: ABNORMAL /HPF
SAO2 % BLDV: 29 % (ref 45–75)
SODIUM SERPL-SCNC: 127 MMOL/L (ref 136–145)
SP GR UR STRIP.AUTO: 1.02
SQUAMOUS #/AREA URNS AUTO: ABNORMAL /HPF
UROBILINOGEN UR STRIP.AUTO-MCNC: <2 MG/DL
WBC # BLD AUTO: 5.7 X10*3/UL (ref 4.4–11.3)
WBC #/AREA URNS AUTO: >50 /HPF
WBC CLUMPS #/AREA URNS AUTO: ABNORMAL /HPF

## 2024-02-18 PROCEDURE — 85025 COMPLETE CBC W/AUTO DIFF WBC: CPT | Performed by: STUDENT IN AN ORGANIZED HEALTH CARE EDUCATION/TRAINING PROGRAM

## 2024-02-18 PROCEDURE — 80053 COMPREHEN METABOLIC PANEL: CPT | Performed by: STUDENT IN AN ORGANIZED HEALTH CARE EDUCATION/TRAINING PROGRAM

## 2024-02-18 PROCEDURE — 82010 KETONE BODYS QUAN: CPT | Mod: AHULAB | Performed by: STUDENT IN AN ORGANIZED HEALTH CARE EDUCATION/TRAINING PROGRAM

## 2024-02-18 PROCEDURE — 2500000004 HC RX 250 GENERAL PHARMACY W/ HCPCS (ALT 636 FOR OP/ED): Performed by: STUDENT IN AN ORGANIZED HEALTH CARE EDUCATION/TRAINING PROGRAM

## 2024-02-18 PROCEDURE — 81001 URINALYSIS AUTO W/SCOPE: CPT | Performed by: STUDENT IN AN ORGANIZED HEALTH CARE EDUCATION/TRAINING PROGRAM

## 2024-02-18 PROCEDURE — 2500000002 HC RX 250 W HCPCS SELF ADMINISTERED DRUGS (ALT 637 FOR MEDICARE OP, ALT 636 FOR OP/ED): Performed by: STUDENT IN AN ORGANIZED HEALTH CARE EDUCATION/TRAINING PROGRAM

## 2024-02-18 PROCEDURE — 82947 ASSAY GLUCOSE BLOOD QUANT: CPT

## 2024-02-18 PROCEDURE — 82947 ASSAY GLUCOSE BLOOD QUANT: CPT | Mod: 59

## 2024-02-18 PROCEDURE — 36415 COLL VENOUS BLD VENIPUNCTURE: CPT | Performed by: STUDENT IN AN ORGANIZED HEALTH CARE EDUCATION/TRAINING PROGRAM

## 2024-02-18 PROCEDURE — 96365 THER/PROPH/DIAG IV INF INIT: CPT

## 2024-02-18 PROCEDURE — 82805 BLOOD GASES W/O2 SATURATION: CPT | Performed by: STUDENT IN AN ORGANIZED HEALTH CARE EDUCATION/TRAINING PROGRAM

## 2024-02-18 PROCEDURE — 2500000001 HC RX 250 WO HCPCS SELF ADMINISTERED DRUGS (ALT 637 FOR MEDICARE OP): Performed by: STUDENT IN AN ORGANIZED HEALTH CARE EDUCATION/TRAINING PROGRAM

## 2024-02-18 PROCEDURE — 99284 EMERGENCY DEPT VISIT MOD MDM: CPT | Mod: 25

## 2024-02-18 PROCEDURE — 83735 ASSAY OF MAGNESIUM: CPT | Performed by: STUDENT IN AN ORGANIZED HEALTH CARE EDUCATION/TRAINING PROGRAM

## 2024-02-18 RX ORDER — INSULIN LISPRO 100 [IU]/ML
10 INJECTION, SOLUTION INTRAVENOUS; SUBCUTANEOUS ONCE
Status: COMPLETED | OUTPATIENT
Start: 2024-02-18 | End: 2024-02-18

## 2024-02-18 RX ORDER — INSULIN GLARGINE 100 [IU]/ML
INJECTION, SOLUTION SUBCUTANEOUS
Qty: 15 ML | Refills: 0 | Status: SHIPPED | OUTPATIENT
Start: 2024-02-18

## 2024-02-18 RX ORDER — CEPHALEXIN 500 MG/1
500 CAPSULE ORAL 4 TIMES DAILY
Qty: 28 CAPSULE | Refills: 0 | Status: SHIPPED | OUTPATIENT
Start: 2024-02-18 | End: 2024-02-25

## 2024-02-18 RX ORDER — TRIAMCINOLONE ACETONIDE 40 MG/ML
1 INJECTION, SUSPENSION INTRA-ARTICULAR; INTRAMUSCULAR
Status: COMPLETED | OUTPATIENT
Start: 2023-10-31 | End: 2023-10-31

## 2024-02-18 RX ORDER — POTASSIUM CHLORIDE 1.5 G/1.58G
40 POWDER, FOR SOLUTION ORAL ONCE
Status: COMPLETED | OUTPATIENT
Start: 2024-02-18 | End: 2024-02-18

## 2024-02-18 RX ORDER — LIDOCAINE HYDROCHLORIDE 10 MG/ML
4 INJECTION INFILTRATION; PERINEURAL
Status: COMPLETED | OUTPATIENT
Start: 2023-10-31 | End: 2023-10-31

## 2024-02-18 RX ADMIN — CEFTRIAXONE 2 G: 2 INJECTION, POWDER, FOR SOLUTION INTRAMUSCULAR; INTRAVENOUS at 18:25

## 2024-02-18 RX ADMIN — SODIUM CHLORIDE 1000 ML: 9 INJECTION, SOLUTION INTRAVENOUS at 16:45

## 2024-02-18 RX ADMIN — POTASSIUM CHLORIDE 40 MEQ: 1.5 POWDER, FOR SOLUTION ORAL at 18:26

## 2024-02-18 RX ADMIN — INSULIN LISPRO 10 UNITS: 100 INJECTION, SOLUTION INTRAVENOUS; SUBCUTANEOUS at 18:10

## 2024-02-18 RX ADMIN — SODIUM CHLORIDE 1000 ML: 9 INJECTION, SOLUTION INTRAVENOUS at 18:28

## 2024-02-18 ASSESSMENT — PAIN - FUNCTIONAL ASSESSMENT: PAIN_FUNCTIONAL_ASSESSMENT: 0-10

## 2024-02-18 ASSESSMENT — PAIN SCALES - GENERAL
PAINLEVEL_OUTOF10: 0 - NO PAIN
PAINLEVEL_OUTOF10: 0 - NO PAIN

## 2024-02-18 ASSESSMENT — COLUMBIA-SUICIDE SEVERITY RATING SCALE - C-SSRS
1. IN THE PAST MONTH, HAVE YOU WISHED YOU WERE DEAD OR WISHED YOU COULD GO TO SLEEP AND NOT WAKE UP?: NO
2. HAVE YOU ACTUALLY HAD ANY THOUGHTS OF KILLING YOURSELF?: NO
6. HAVE YOU EVER DONE ANYTHING, STARTED TO DO ANYTHING, OR PREPARED TO DO ANYTHING TO END YOUR LIFE?: NO

## 2024-02-18 NOTE — ED PROVIDER NOTES
"EMERGENCY MEDICINE EVALUATION NOTE    History of Present Illness     Chief Complaint:   Chief Complaint   Patient presents with    Hyperglycemia     . Pt was taking Lantus but was told by PCP to discontinue at the end of last month due to improved A1C.     Med Refill       HPI: Keesha Franklin is a 73 y.o. female with past medical history of type 2 diabetes insulin-dependent, CKD stage III, CVA, and atrial fibrillation on Eliquis who presents with complaint of hyperglycemia.  Patient states she has not been on any insulin over the past 2 to 3 weeks secondary to improved A1c after visiting her primary care provider.  She states she normally was taking Lantus as well as 3 times daily insulin Premeal and has not been on this for the past 2 to 3 weeks.  She states that her boyfriend did notice she was somewhat pale and did not \"seem\" and told her to come to the emergency room for evaluation.  She does admit increased urination over the past several days left denies any dysuria or hematuria.  Denies any abdominal pain, nausea, vomiting, chest pain, shortness of breath, fever.  She does admit some mild chills.  No other complaints at this time.  She states she took her blood glucose and it was 490 prior to arrival.    Previous History     Past Medical History:   Diagnosis Date    Adjustment disorder with depressed mood 11/11/2014    Grief reaction    Body mass index (BMI) 35.0-35.9, adult 08/30/2021    Body mass index (BMI) of 35.0 to 35.9 in adult    Cardiac murmur, unspecified 06/18/2021    Systolic murmur    Cerebral infarction, unspecified (CMS/HCC) 10/25/2022    Acute cerebrovascular accident (CVA)    Chronic kidney disease, stage 3a (CMS/HCC) 04/19/2022    Chronic renal impairment, stage 3a    Encounter for general adult medical examination without abnormal findings 01/28/2021    Medicare annual wellness visit, subsequent    Encounter for screening for malignant neoplasm of colon 11/07/2019    Colon cancer " screening    Encounter for screening for malignant neoplasm of colon 01/28/2021    Colon cancer screening    Essential (primary) hypertension 10/25/2022    Benign essential hypertension    Mammographic microcalcification found on diagnostic imaging of breast 09/09/2014    Abnormal mammogram with microcalcification    Menopausal and female climacteric states 11/07/2019    Menopause syndrome    Obesity, unspecified 06/30/2022    Class 2 obesity with body mass index (BMI) of 35.0 to 35.9 in adult    Pain in left foot 08/01/2022    Left foot pain    Pain in left hip 12/20/2016    Left hip pain    Personal history of other endocrine, nutritional and metabolic disease 10/25/2022    History of diabetes mellitus    Polyneuropathy, unspecified 08/01/2022    Peripheral neuropathy    Primary osteoarthritis, unspecified ankle and foot 08/01/2022    Arthritis of foot    Type 2 diabetes mellitus with diabetic chronic kidney disease (CMS/HCC) 08/30/2021    Diabetes mellitus with stage 3a chronic kidney disease, without long-term current use of insulin     Past Surgical History:   Procedure Laterality Date    BREAST LUMPECTOMY  06/03/2014    Breast Surgery Lumpectomy    EXPLORATORY LAPAROTOMY  06/03/2014    Exploratory Laparotomy    MASTECTOMY  06/30/2022    Breast Surgery Mastectomy    MR HEAD ANGIO WO IV CONTRAST  9/5/2017    MR HEAD ANGIO WO IV CONTRAST 9/5/2017 [facility]    MR NECK ANGIO WO IV CONTRAST  9/5/2017    MR NECK ANGIO WO IV CONTRAST 9/5/2017 [facility]     Social History     Tobacco Use    Smoking status: Never    Smokeless tobacco: Never   Substance Use Topics    Alcohol use: Not Currently     Alcohol/week: 1.0 standard drink of alcohol     Types: 1 Shots of liquor per week    Drug use: Never     Family History   Problem Relation Name Age of Onset    Hypertension Mother      Coronary artery disease Father      Other (cardiac disorder) Father      Hypertension Sister      COPD Sister      COPD  "Brother       Allergies   Allergen Reactions    Lisinopril Angioedema and Unknown     Current Outpatient Medications   Medication Instructions    amLODIPine (Norvasc) 5 mg tablet 1 tablet, oral, Daily    apixaban (ELIQUIS) 5 mg, oral, 2 times daily    aspirin 81 mg EC tablet 1 tablet, oral, Daily    atorvastatin (Lipitor) 80 mg tablet TAKE 1 TABLET BY MOUTH EVERYDAY AT BEDTIME    blood sugar diagnostic (Accu-Chek Tiffanie Plus test strp) strip USE TO TEST THREE TIMES A DAY    blood-glucose sensor (Dexcom G7 Sensor) device 1 kit, miscellaneous, Daily    cephalexin (KEFLEX) 500 mg, oral, 4 times daily    escitalopram (Lexapro) 10 mg tablet TAKE 1 TABLET BY MOUTH EVERY DAY    furosemide (LASIX) 20 mg, oral, Daily    gabapentin (Neurontin) 100 mg capsule oral    glipiZIDE XL (Glucotrol XL) 10 mg 24 hr tablet oral, 2 times daily    insulin glargine (Lantus Solostar U-100 Insulin) 100 unit/mL (3 mL) pen INJECT 18-20 UNITS SUBCUTANEOUSLY DAILY    isosorbide mononitrate ER (Imdur) 60 mg 24 hr tablet TAKE 1 TABLET BY MOUTH ONCE DAILY.    Jardiance 10 mg, oral, Daily    metoprolol succinate XL (TOPROL-XL) 12.5 mg, oral, Nightly    nitroglycerin (NITROSTAT) 0.4 mg, sublingual, Every 5 min PRN, Up to 3 times.    pen needle, diabetic (BD Ultra-Fine Sameera Pen Needle) 32 gauge x 5/32\" needle USE AS DIRECTED TO TEST THREE TIMES A DAY    potassium chloride CR (Klor-Con) 10 mEq ER tablet 10 mEq, oral, Daily, Do not crush, chew, or split.    semaglutide 0.25 mg, subcutaneous, Weekly    sodium hyaluronate (Durolane) 60 mg/3 mL injection intra-articular    valsartan-hydrochlorothiazide (Diovan-HCT) 160-25 mg tablet TAKE ONE TABLET BY MOUTH ONCE A DAY       Physical Exam     Appearance: Alert, oriented , cooperative,  in acute distress. Well nourished & well hydrated.     Skin: Intact,  dry skin, no lesions, rash, petechiae or purpura.      Eyes: PERRLA, EOMs intact,  Conjunctiva pink with no redness or exudates. Cornea & anterior chamber " are clear, Eyelids without lesions. No scleral icterus.      ENT: Hearing grossly intact. External auditory canals patent, tympanic membranes intact with visible landmarks. Nares patent, mucus membranes moist. Dentition without lesions. Pharynx clear, uvula midline.      Neck: Supple, without meningismus. Thyroid not palpable. Trachea at midline. No lymphadenopathy.     Pulmonary: Clear bilaterally with good chest wall excursion. No rales, rhonchi or wheezing. No accessory muscle use or stridor.     Cardiac: Normal S1, S2 without murmur, rub, gallop or extrasystole. No JVD, Carotids without bruits.     Abdomen: Soft, nontender, active bowel sounds.  No palpable organomegaly.  No rebound or guarding.  No CVA tenderness.     Genitourinary: Exam deferred.     Musculoskeletal: Full range of motion. no pain, edema, or deformity. Pulses full and equal. No cyanosis or clubbing.      Neurological:  Cranial nerves II through XII are grossly intact, finger-nose touch is normal, normal sensation, no weakness, no focal findings identified.     Psychiatric: Appropriate mood and affect.      Results     Labs Reviewed   URINALYSIS WITH REFLEX MICROSCOPIC - Abnormal       Result Value    Color, Urine Yellow      Appearance, Urine Cloudy (*)     Specific Gravity, Urine 1.020      pH, Urine 5.0      Protein, Urine NEGATIVE      Glucose, Urine >=500 (3+) (*)     Blood, Urine NEGATIVE      Ketones, Urine NEGATIVE      Bilirubin, Urine NEGATIVE      Urobilinogen, Urine <2.0      Nitrite, Urine POSITIVE (*)     Leukocyte Esterase, Urine LARGE (3+) (*)    MICROSCOPIC ONLY, URINE - Abnormal    WBC, Urine >50 (*)     WBC Clumps, Urine OCCASIONAL      RBC, Urine 3-5      Squamous Epithelial Cells, Urine 1-9 (SPARSE)     CBC WITH AUTO DIFFERENTIAL - Abnormal    WBC 5.7      nRBC 0.0      RBC 4.93      Hemoglobin 13.6      Hematocrit 42.8      MCV 87      MCH 27.6      MCHC 31.8 (*)     RDW 13.9      Platelets 187      Neutrophils % 58.4       "Immature Granulocytes %, Automated 0.5      Lymphocytes % 29.0      Monocytes % 9.7      Eosinophils % 1.9      Basophils % 0.5      Neutrophils Absolute 3.30      Immature Granulocytes Absolute, Automated 0.03      Lymphocytes Absolute 1.64      Monocytes Absolute 0.55      Eosinophils Absolute 0.11      Basophils Absolute 0.03     COMPREHENSIVE METABOLIC PANEL - Abnormal    Glucose 466 (*)     Sodium 127 (*)     Potassium 3.5      Chloride 88 (*)     Bicarbonate 28      Anion Gap 15      Urea Nitrogen 45 (*)     Creatinine 1.78 (*)     eGFR 29 (*)     Calcium 9.7      Albumin 4.2      Alkaline Phosphatase 90      Total Protein 7.5      AST 30      Bilirubin, Total 0.6      ALT 28     MAGNESIUM - Abnormal    Magnesium 3.00 (*)    BLOOD GAS VENOUS - Abnormal    POCT pH, Venous 7.40      POCT pCO2, Venous 56 (*)     POCT pO2, Venous 22 (*)     POCT SO2, Venous 29 (*)     POCT Oxy Hemoglobin, Venous 28.4 (*)     POCT Base Excess, Venous 8.0 (*)     POCT HCO3 Calculated, Venous 34.7 (*)     Patient Temperature 37.0      FiO2 21     POCT GLUCOSE - Abnormal    POCT Glucose 475 (*)    POCT GLUCOSE - Abnormal    POCT Glucose 365 (*)    BETA HYDROXYBUTYRATE   POCT GLUCOSE METER     No orders to display         ED Course & Medical Decision Making     Medications   sodium chloride 0.9 % bolus 1,000 mL (1,000 mL intravenous New Bag 2/18/24 1645)   insulin lispro (HumaLOG) injection 10 Units (10 Units subcutaneous Given 2/18/24 1810)   potassium chloride (Klor-Con) packet 40 mEq (40 mEq oral Given 2/18/24 1826)   sodium chloride 0.9 % bolus 1,000 mL (1,000 mL intravenous New Bag 2/18/24 1828)   cefTRIAXone (Rocephin) in dextrose 5 % water (D5W) 50 mL IV 2 g (2 g intravenous New Bag 2/18/24 1825)     Diagnoses as of 02/18/24 2036   Acute UTI   Hyperglycemia     Heart Rate:  [61-78]   Temperature:  [36.5 °C (97.7 °F)]   Respirations:  [18]   BP: (129-182)/()   Height:  [168.9 cm (5' 6.5\")]   Weight:  [87.1 kg (192 lb)] "   Pulse Ox:  [96 %-99 %]      Keesha Franklin is a 73 y.o. female with past medical history of type 2 diabetes insulin-dependent, CKD stage III, CVA, and atrial fibrillation on Eliquis who presents with complaint of hyperglycemia.  I do have concern for possible underlying DKA, hyperglycemia, currently infection such as pneumonia, UTI, dehydration.  Patient on presentation is otherwise hemodynamic stable and afebrile without any focal neurological deficits.  Initially hypertensive with review of 182/131 although this did improve to 129/71.  Lab work did show a elevated creatinine 1.78 although somewhat near baseline as well as a BUN of 45.  Pseudohyponatremia 127 noted as her initial point-of-care glucose was 470 and on the CMP was 466.  LFTs normal.  No anion gap or concern for DKA.  No acidosis noted on venous blood gas.  No leukocytosis or anemia.  Potassium was 3.5.  Urinalysis positive for now infection with greater than 50 white cells.  She was given IV Rocephin.  She did receive 2 L of normal saline for fluid resuscitation as well as 10 units of insulin lispro and oral potassium for replenishment.  Her point-of-care glucose did improve although still at 365.  She was informed of these findings and did feel stable for home discharge at this time.  She remained hemodynamically stable and will be discharged with 7 days of Keflex for treatment and did receive a refill of her Lantus for home use and was informed to start taking this again for treatment and will follow-up with her PCP for additional medication adjustments.    Procedures   Procedures    Diagnosis     1. Acute UTI    2. Hyperglycemia    3. Type 2 diabetes mellitus without complication, with long-term current use of insulin (CMS/[facility])        Disposition     DISCHARGE.  The patient is discharged back to their place of residence.  Discharge diagnosis, instructions and plan were discussed and understood. At the time of discharge the patient was  comfortable and was in no apparent distress. Patient is aware of diagnostic uncertainty and was notified though testing is negative here, there is a very small chance that pathology may be missed.  The patient understands these risks and the patient /family understood to return immediately to the emergency department if the symptoms worsen or if they have any additional concerns.    FOLLOW UP  Primary care provider in 1-2 days.        ED Prescriptions       Medication Sig Dispense Start Date End Date Auth. Provider    insulin glargine (Lantus Solostar U-100 Insulin) 100 unit/mL (3 mL) pen INJECT 18-20 UNITS SUBCUTANEOUSLY DAILY 15 mL 2/18/2024 -- Julio Driver DO    cephalexin (Keflex) 500 mg capsule Take 1 capsule (500 mg) by mouth 4 times a day for 7 days. 28 capsule 2/18/2024 2/25/2024 DO Julio Purdy DO  02/18/24 2036

## 2024-02-18 NOTE — ED NOTES
Assumed pt care, pt here for hyperglycemia sugar was 494. Pt denies cp. Pt placed on cardiac monitor & IV placed per order       Sandra Bermudez RN  02/18/24 171       Sandra Bermudez RN  02/18/24 2030

## 2024-02-19 NOTE — DISCHARGE INSTRUCTIONS
You have been seen at a [facility].  Please follow-up with your primary care provider in the next 1 to 2 days for further evaluation and routine follow-up.  Please return to the emergency room if having any worsening symptoms.  Please follow-up with any specialists if discussed during your emergency room stay.

## 2024-02-20 ENCOUNTER — OFFICE VISIT (OUTPATIENT)
Dept: PRIMARY CARE | Facility: CLINIC | Age: 74
End: 2024-02-20
Payer: COMMERCIAL

## 2024-02-20 VITALS
BODY MASS INDEX: 29.09 KG/M2 | WEIGHT: 183 LBS | TEMPERATURE: 96.6 F | DIASTOLIC BLOOD PRESSURE: 81 MMHG | SYSTOLIC BLOOD PRESSURE: 131 MMHG

## 2024-02-20 DIAGNOSIS — F41.8 DEPRESSION WITH ANXIETY: ICD-10-CM

## 2024-02-20 DIAGNOSIS — I10 BENIGN ESSENTIAL HYPERTENSION: Primary | ICD-10-CM

## 2024-02-20 DIAGNOSIS — E08.00 DIABETES MELLITUS DUE TO UNDERLYING CONDITION WITH HYPEROSMOLARITY WITHOUT COMA, WITH LONG-TERM CURRENT USE OF INSULIN (MULTI): ICD-10-CM

## 2024-02-20 DIAGNOSIS — Z79.4 DIABETES MELLITUS DUE TO UNDERLYING CONDITION WITH HYPEROSMOLARITY WITHOUT COMA, WITH LONG-TERM CURRENT USE OF INSULIN (MULTI): ICD-10-CM

## 2024-02-20 DIAGNOSIS — I48.0 PAROXYSMAL ATRIAL FIBRILLATION (MULTI): ICD-10-CM

## 2024-02-20 DIAGNOSIS — N18.31 CHRONIC RENAL IMPAIRMENT, STAGE 3A (MULTI): ICD-10-CM

## 2024-02-20 PROCEDURE — 1126F AMNT PAIN NOTED NONE PRSNT: CPT | Performed by: INTERNAL MEDICINE

## 2024-02-20 PROCEDURE — 99214 OFFICE O/P EST MOD 30 MIN: CPT | Performed by: INTERNAL MEDICINE

## 2024-02-20 PROCEDURE — 1159F MED LIST DOCD IN RCRD: CPT | Performed by: INTERNAL MEDICINE

## 2024-02-20 PROCEDURE — 1036F TOBACCO NON-USER: CPT | Performed by: INTERNAL MEDICINE

## 2024-02-20 PROCEDURE — 3075F SYST BP GE 130 - 139MM HG: CPT | Performed by: INTERNAL MEDICINE

## 2024-02-20 PROCEDURE — 3079F DIAST BP 80-89 MM HG: CPT | Performed by: INTERNAL MEDICINE

## 2024-02-20 ASSESSMENT — ENCOUNTER SYMPTOMS
ABDOMINAL PAIN: 0
PALPITATIONS: 0
FATIGUE: 1
APPETITE CHANGE: 0
NERVOUS/ANXIOUS: 0
UNEXPECTED WEIGHT CHANGE: 1
DIFFICULTY URINATING: 0
DECREASED CONCENTRATION: 0
WEAKNESS: 0
WHEEZING: 0
DYSURIA: 0
CHEST TIGHTNESS: 0
SLEEP DISTURBANCE: 0
DIZZINESS: 0
SORE THROAT: 0
FLANK PAIN: 0
COUGH: 0
FREQUENCY: 0
NECK PAIN: 0
CHILLS: 0
ACTIVITY CHANGE: 0
BLOOD IN STOOL: 0
ARTHRALGIAS: 0
FEVER: 0
HEADACHES: 0
SHORTNESS OF BREATH: 0
BACK PAIN: 0
LIGHT-HEADEDNESS: 0

## 2024-02-20 NOTE — PATIENT INSTRUCTIONS
Continue Lantus 15 to 18 units/day  Continue with your current medications including Ozempic 0.25 mg daily  Take Lasix every other day  Blood work in 1 week  Follow-up with 3 months

## 2024-02-20 NOTE — PROGRESS NOTES
Subjective   Patient ID: Keseha Franklin is a 73 y.o. female who presents for Follow-up (Pt present today for 5 month follow up. ls).    HPI patient seen for follow-up after ER visit.  Her medical history is significant for CAD, history of CVA, hypertension, diabetes, hyperlipidemia, history of breast cancer.  2 months ago her Lantus was discontinued and her glucose readings was running low in 60s.  She is still taking Ozempic, glipizide, Jardiance.  She was seen by cardiology for shortness of breath.  She got diagnosed with new onset A-fib.  She started on Xarelto and Lasix.  She states that she has been urinating constantly.  2 weeks later she went to the ER for elevated glucose levels in 400.  She got restarted on Lantus and discharged home.    Review of Systems   Constitutional:  Positive for fatigue and unexpected weight change. Negative for activity change, appetite change, chills and fever.   HENT:  Negative for congestion, postnasal drip and sore throat.    Eyes:  Negative for visual disturbance.   Respiratory:  Negative for cough, chest tightness, shortness of breath and wheezing.    Cardiovascular:  Negative for chest pain, palpitations and leg swelling.   Gastrointestinal:  Negative for abdominal pain and blood in stool.   Endocrine: Negative for cold intolerance and heat intolerance.   Genitourinary:  Negative for difficulty urinating, dysuria, flank pain and frequency.   Musculoskeletal:  Negative for arthralgias, back pain, gait problem and neck pain.   Skin:  Negative for rash.   Allergic/Immunologic: Negative for environmental allergies and food allergies.   Neurological:  Negative for dizziness, weakness, light-headedness and headaches.   Psychiatric/Behavioral:  Negative for decreased concentration and sleep disturbance. The patient is not nervous/anxious.        Objective   /81   Temp 35.9 °C (96.6 °F)   Wt 83 kg (183 lb)   BMI 29.09 kg/m²     Physical Exam  Vitals reviewed.    Constitutional:       General: She is not in acute distress.     Appearance: Normal appearance.   HENT:      Head: Normocephalic and atraumatic.   Cardiovascular:      Rate and Rhythm: Normal rate and regular rhythm.      Pulses: Normal pulses.   Pulmonary:      Effort: Pulmonary effort is normal. No respiratory distress.      Breath sounds: Normal breath sounds.   Abdominal:      General: Bowel sounds are normal. There is no distension.      Tenderness: There is no abdominal tenderness.   Musculoskeletal:         General: No swelling or tenderness. Normal range of motion.      Cervical back: Normal range of motion.   Skin:     General: Skin is warm.   Neurological:      General: No focal deficit present.      Mental Status: She is alert.      Coordination: Coordination normal.      Gait: Gait normal.   Psychiatric:         Mood and Affect: Mood normal.         Behavior: Behavior normal.         Assessment/Plan   Diagnoses and all orders for this visit:  Benign essential hypertension  Comments:  Stable  Continue amlodipine, valsartan/HCTZ  Chronic renal impairment, stage 3a (CMS/HCC)  -     Basic Metabolic Panel; Future  Diabetes mellitus due to underlying condition with hyperosmolarity without coma, with long-term current use of insulin (CMS/[facility])  Comments:  Recent episode of ER visit due to hyperglycemia  Restarted on Lantus-patient to continue Lantus from 15-18 units, continue Ozempic  Continue glucose monitoring  Depression with anxiety  Paroxysmal atrial fibrillation (CMS/HCC)  Comments:  New onset A-fib  Continue metoprolol  Continue Xarelto  Restart Lantus  Continue glucose monitoring  Take furosemide every other day  Follow-up with cardiology as scheduled  Blood work-BMP in 1 week  Follow-up in 3 months

## 2024-02-23 ENCOUNTER — APPOINTMENT (OUTPATIENT)
Dept: CARDIOLOGY | Facility: CLINIC | Age: 74
End: 2024-02-23
Payer: MEDICARE

## 2024-02-23 ENCOUNTER — LAB (OUTPATIENT)
Dept: LAB | Facility: LAB | Age: 74
End: 2024-02-23
Payer: COMMERCIAL

## 2024-02-23 DIAGNOSIS — N18.31 CHRONIC RENAL IMPAIRMENT, STAGE 3A (MULTI): ICD-10-CM

## 2024-02-23 DIAGNOSIS — Z79.4 DIABETES MELLITUS DUE TO UNDERLYING CONDITION WITH HYPEROSMOLARITY WITHOUT COMA, WITH LONG-TERM CURRENT USE OF INSULIN (MULTI): ICD-10-CM

## 2024-02-23 DIAGNOSIS — I10 BENIGN ESSENTIAL HYPERTENSION: ICD-10-CM

## 2024-02-23 DIAGNOSIS — E78.49 OTHER HYPERLIPIDEMIA: ICD-10-CM

## 2024-02-23 DIAGNOSIS — E08.00 DIABETES MELLITUS DUE TO UNDERLYING CONDITION WITH HYPEROSMOLARITY WITHOUT COMA, WITH LONG-TERM CURRENT USE OF INSULIN (MULTI): ICD-10-CM

## 2024-02-23 LAB
ALBUMIN SERPL BCP-MCNC: 4.1 G/DL (ref 3.4–5)
ALP SERPL-CCNC: 81 U/L (ref 33–136)
ALT SERPL W P-5'-P-CCNC: 24 U/L (ref 7–45)
ANION GAP SERPL CALC-SCNC: 13 MMOL/L (ref 10–20)
AST SERPL W P-5'-P-CCNC: 23 U/L (ref 9–39)
BILIRUB SERPL-MCNC: 0.6 MG/DL (ref 0–1.2)
BUN SERPL-MCNC: 22 MG/DL (ref 6–23)
CALCIUM SERPL-MCNC: 10.1 MG/DL (ref 8.6–10.6)
CHLORIDE SERPL-SCNC: 101 MMOL/L (ref 98–107)
CHOLEST SERPL-MCNC: 165 MG/DL (ref 0–199)
CHOLESTEROL/HDL RATIO: 2.6
CO2 SERPL-SCNC: 30 MMOL/L (ref 21–32)
CREAT SERPL-MCNC: 1.46 MG/DL (ref 0.5–1.05)
EGFRCR SERPLBLD CKD-EPI 2021: 37 ML/MIN/1.73M*2
ERYTHROCYTE [DISTWIDTH] IN BLOOD BY AUTOMATED COUNT: 14.5 % (ref 11.5–14.5)
EST. AVERAGE GLUCOSE BLD GHB EST-MCNC: 229 MG/DL
GLUCOSE SERPL-MCNC: 200 MG/DL (ref 74–99)
HBA1C MFR BLD: 9.6 %
HCT VFR BLD AUTO: 43.8 % (ref 36–46)
HDLC SERPL-MCNC: 62.4 MG/DL
HGB BLD-MCNC: 13.9 G/DL (ref 12–16)
LDLC SERPL CALC-MCNC: 76 MG/DL
MCH RBC QN AUTO: 28 PG (ref 26–34)
MCHC RBC AUTO-ENTMCNC: 31.7 G/DL (ref 32–36)
MCV RBC AUTO: 88 FL (ref 80–100)
NON HDL CHOLESTEROL: 103 MG/DL (ref 0–149)
NRBC BLD-RTO: 0 /100 WBCS (ref 0–0)
PLATELET # BLD AUTO: 196 X10*3/UL (ref 150–450)
POTASSIUM SERPL-SCNC: 4.4 MMOL/L (ref 3.5–5.3)
PROT SERPL-MCNC: 7 G/DL (ref 6.4–8.2)
RBC # BLD AUTO: 4.97 X10*6/UL (ref 4–5.2)
SODIUM SERPL-SCNC: 140 MMOL/L (ref 136–145)
TRIGL SERPL-MCNC: 135 MG/DL (ref 0–149)
TSH SERPL-ACNC: 2.6 MIU/L (ref 0.44–3.98)
VLDL: 27 MG/DL (ref 0–40)
WBC # BLD AUTO: 5.1 X10*3/UL (ref 4.4–11.3)

## 2024-02-23 PROCEDURE — 83036 HEMOGLOBIN GLYCOSYLATED A1C: CPT

## 2024-02-23 PROCEDURE — 84443 ASSAY THYROID STIM HORMONE: CPT

## 2024-02-23 PROCEDURE — 80053 COMPREHEN METABOLIC PANEL: CPT

## 2024-02-23 PROCEDURE — 80061 LIPID PANEL: CPT

## 2024-02-23 PROCEDURE — 36415 COLL VENOUS BLD VENIPUNCTURE: CPT

## 2024-02-23 PROCEDURE — 85027 COMPLETE CBC AUTOMATED: CPT

## 2024-02-26 ENCOUNTER — TELEPHONE (OUTPATIENT)
Dept: PRIMARY CARE | Facility: CLINIC | Age: 74
End: 2024-02-26
Payer: COMMERCIAL

## 2024-02-26 DIAGNOSIS — I48.91 ATRIAL FIBRILLATION, UNSPECIFIED TYPE (MULTI): ICD-10-CM

## 2024-02-26 DIAGNOSIS — E08.00 DIABETES MELLITUS DUE TO UNDERLYING CONDITION WITH HYPEROSMOLARITY WITHOUT COMA, WITH LONG-TERM CURRENT USE OF INSULIN (MULTI): Primary | ICD-10-CM

## 2024-02-26 DIAGNOSIS — F32.A DEPRESSION, UNSPECIFIED DEPRESSION TYPE: ICD-10-CM

## 2024-02-26 DIAGNOSIS — Z79.4 DIABETES MELLITUS DUE TO UNDERLYING CONDITION WITH HYPEROSMOLARITY WITHOUT COMA, WITH LONG-TERM CURRENT USE OF INSULIN (MULTI): Primary | ICD-10-CM

## 2024-02-26 DIAGNOSIS — I10 PRIMARY HYPERTENSION: ICD-10-CM

## 2024-02-26 DIAGNOSIS — I50.22 CHRONIC SYSTOLIC HEART FAILURE (MULTI): ICD-10-CM

## 2024-02-26 DIAGNOSIS — E78.5 HYPERLIPIDEMIA, UNSPECIFIED HYPERLIPIDEMIA TYPE: ICD-10-CM

## 2024-02-26 RX ORDER — ESCITALOPRAM OXALATE 10 MG/1
10 TABLET ORAL DAILY
Qty: 90 TABLET | Refills: 1 | Status: SHIPPED | OUTPATIENT
Start: 2024-02-26

## 2024-02-26 RX ORDER — ISOSORBIDE MONONITRATE 60 MG/1
60 TABLET, EXTENDED RELEASE ORAL DAILY
Qty: 90 TABLET | Refills: 1 | Status: SHIPPED | OUTPATIENT
Start: 2024-02-26

## 2024-02-26 RX ORDER — METOPROLOL SUCCINATE 25 MG/1
12.5 TABLET, EXTENDED RELEASE ORAL NIGHTLY
Qty: 90 TABLET | Refills: 1 | Status: SHIPPED | OUTPATIENT
Start: 2024-02-26

## 2024-02-26 RX ORDER — AMLODIPINE BESYLATE 5 MG/1
5 TABLET ORAL DAILY
Qty: 90 TABLET | Refills: 1 | Status: SHIPPED | OUTPATIENT
Start: 2024-02-26

## 2024-02-26 RX ORDER — FUROSEMIDE 20 MG/1
20 TABLET ORAL DAILY
Qty: 90 TABLET | Refills: 1 | Status: SHIPPED | OUTPATIENT
Start: 2024-02-26 | End: 2025-02-20

## 2024-02-26 RX ORDER — VALSARTAN AND HYDROCHLOROTHIAZIDE 160; 25 MG/1; MG/1
1 TABLET ORAL DAILY
Qty: 90 TABLET | Refills: 1 | Status: SHIPPED | OUTPATIENT
Start: 2024-02-26

## 2024-02-26 RX ORDER — ATORVASTATIN CALCIUM 80 MG/1
80 TABLET, FILM COATED ORAL NIGHTLY
Qty: 90 TABLET | Refills: 1 | Status: SHIPPED | OUTPATIENT
Start: 2024-02-26

## 2024-02-26 RX ORDER — EMPAGLIFLOZIN 10 MG/1
10 TABLET, FILM COATED ORAL DAILY
Qty: 90 TABLET | Refills: 1 | Status: SHIPPED | OUTPATIENT
Start: 2024-02-26

## 2024-03-05 ENCOUNTER — APPOINTMENT (OUTPATIENT)
Dept: ORTHOPEDIC SURGERY | Facility: CLINIC | Age: 74
End: 2024-03-05
Payer: COMMERCIAL

## 2024-03-07 ENCOUNTER — APPOINTMENT (OUTPATIENT)
Dept: RADIOLOGY | Facility: HOSPITAL | Age: 74
End: 2024-03-07
Payer: COMMERCIAL

## 2024-04-01 ENCOUNTER — APPOINTMENT (OUTPATIENT)
Dept: CARDIOLOGY | Facility: CLINIC | Age: 74
End: 2024-04-01

## 2024-04-08 ENCOUNTER — TELEPHONE (OUTPATIENT)
Dept: CARDIOLOGY | Facility: CLINIC | Age: 74
End: 2024-04-08
Payer: MEDICARE

## 2024-04-08 ENCOUNTER — SPECIALTY PHARMACY (OUTPATIENT)
Dept: PHARMACY | Facility: CLINIC | Age: 74
End: 2024-04-08

## 2024-04-08 NOTE — TELEPHONE ENCOUNTER
Pt called back and had questions regarding her nuclear stress test. Answered all questions and pt verbalized understanding.

## 2024-04-09 ENCOUNTER — TELEPHONE (OUTPATIENT)
Dept: PHARMACY | Facility: HOSPITAL | Age: 74
End: 2024-04-09
Payer: MEDICARE

## 2024-04-09 PROCEDURE — RXMED WILLOW AMBULATORY MEDICATION CHARGE

## 2024-04-09 NOTE — TELEPHONE ENCOUNTER
Patient Assistance Program Approval:     We are pleased to inform you that your application for assistance has been approved.     This approval is valid through  4/9/2025  as long as the following criteria continue to be satisfied:     Your medication (Eliquis) remains covered under your current insurance plan.   Your prescriber does not discontinue therapy.   You do not seek reimbursement from any other private or government-funded programs for the  medication.    Under this program, the pharmacy will first bill your insurance plan for your indemnified specified medication. The [employer] will then offset your copay balance, so that your out-of pocket expense for your specialty medication will be $0.00.    LVM with information for patient.     Abigail Coto, LeninD

## 2024-04-11 ENCOUNTER — PHARMACY VISIT (OUTPATIENT)
Dept: PHARMACY | Facility: CLINIC | Age: 74
End: 2024-04-11
Payer: MEDICARE

## 2024-04-15 ENCOUNTER — HOSPITAL ENCOUNTER (OUTPATIENT)
Dept: RADIOLOGY | Facility: HOSPITAL | Age: 74
Discharge: HOME | End: 2024-04-15
Payer: MEDICARE

## 2024-04-15 ENCOUNTER — HOSPITAL ENCOUNTER (OUTPATIENT)
Dept: CARDIOLOGY | Facility: HOSPITAL | Age: 74
Discharge: HOME | End: 2024-04-15
Payer: MEDICARE

## 2024-04-15 DIAGNOSIS — Z01.810 PREOP CARDIOVASCULAR EXAM: ICD-10-CM

## 2024-04-15 DIAGNOSIS — I42.9 CARDIOMYOPATHY, UNSPECIFIED TYPE (MULTI): ICD-10-CM

## 2024-04-15 PROCEDURE — 2500000004 HC RX 250 GENERAL PHARMACY W/ HCPCS (ALT 636 FOR OP/ED): Performed by: INTERNAL MEDICINE

## 2024-04-15 PROCEDURE — A9502 TC99M TETROFOSMIN: HCPCS | Performed by: INTERNAL MEDICINE

## 2024-04-15 PROCEDURE — 3430000001 HC RX 343 DIAGNOSTIC RADIOPHARMACEUTICALS: Performed by: INTERNAL MEDICINE

## 2024-04-15 PROCEDURE — 93017 CV STRESS TEST TRACING ONLY: CPT

## 2024-04-15 PROCEDURE — 78452 HT MUSCLE IMAGE SPECT MULT: CPT

## 2024-04-15 RX ORDER — REGADENOSON 0.08 MG/ML
0.4 INJECTION, SOLUTION INTRAVENOUS
Status: COMPLETED | OUTPATIENT
Start: 2024-04-15 | End: 2024-04-15

## 2024-04-15 RX ADMIN — REGADENOSON 0.4 MG: 0.08 INJECTION, SOLUTION INTRAVENOUS at 10:52

## 2024-04-15 RX ADMIN — TETROFOSMIN 36 MILLICURIE: 0.23 INJECTION, POWDER, LYOPHILIZED, FOR SOLUTION INTRAVENOUS at 10:55

## 2024-04-15 RX ADMIN — TETROFOSMIN 10.7 MILLICURIE: 0.23 INJECTION, POWDER, LYOPHILIZED, FOR SOLUTION INTRAVENOUS at 09:17

## 2024-04-16 ENCOUNTER — TELEPHONE (OUTPATIENT)
Dept: CARDIOLOGY | Facility: CLINIC | Age: 74
End: 2024-04-16
Payer: MEDICARE

## 2024-04-16 ENCOUNTER — APPOINTMENT (OUTPATIENT)
Dept: ORTHOPEDIC SURGERY | Facility: CLINIC | Age: 74
End: 2024-04-16
Payer: COMMERCIAL

## 2024-04-16 NOTE — TELEPHONE ENCOUNTER
----- Message from Juancarlos Browning MD sent at 4/15/2024  3:59 PM EDT -----  Notify pt stress test showed no reduced blood flow in heart.  Heart strength was mildly reduced at 46%. No surprise.

## 2024-04-19 PROBLEM — R06.83 SNORING: Status: ACTIVE | Noted: 2024-01-29

## 2024-04-19 PROBLEM — R60.9 EDEMA: Status: ACTIVE | Noted: 2024-01-29

## 2024-04-19 PROBLEM — G62.9 PERIPHERAL NERVE DISEASE: Status: ACTIVE | Noted: 2023-07-17

## 2024-04-19 PROBLEM — R73.9 HYPERGLYCEMIA: Status: ACTIVE | Noted: 2024-04-19

## 2024-04-19 PROBLEM — A59.9 TRICHOMONIASIS: Status: ACTIVE | Noted: 2024-04-19

## 2024-04-19 PROBLEM — R53.83 FATIGUE: Status: ACTIVE | Noted: 2024-01-29

## 2024-04-19 PROBLEM — N39.0 ACUTE URINARY TRACT INFECTION: Status: ACTIVE | Noted: 2024-04-19

## 2024-04-23 NOTE — TELEPHONE ENCOUNTER
Result Communication    Resulted Orders   Nuclear Stress Test    Narrative    Interpreted By:  Gracie Ann and Maltbie Grace   STUDY:  NUCLEAR STRESS TEST;  4/15/2024 12:52 pm      INDICATION:  Signs/Symptoms:preop, new CM.      COMPARISON:  None.      ACCESSION NUMBER(S):  [id number]      ORDERING CLINICIAN:  FREDY EAST      TECHNIQUE:  DIVISION OF NUCLEAR MEDICINE  PHARMACOLOGIC STRESS MYOCARDIAL PERFUSION SCAN, ONE DAY PROTOCOL      The patient received an intravenous dose of  10.7 mCi of Tc-99m  Myoview and resting emission tomographic (SPECT) images of the  myocardium were acquired. The patient then received an intravenous  infusion of 0.4mg regadenoson (Lexiscan)  followed by an additional  dose of  36.0 mCi of Tc-99m  Myoview. Stress phase SPECT images of  the myocardium were then acquired. These included ECG-gated images to  assess and quantify ventricular function.  A low-dose, nondiagnostic  regional CT was utilized for attenuation correction purposes.      FINDINGS:  Predominantly fixed large-sized perfusion defects involving apex,  apical to mid anterior wall, apical to mid septal wall as well as  apical lateral wall, likely represents infarction. Decreased  perfusion in the inferior wall with preserved wall motion and  thickness, likely artifacts. Otherwise, both stress and rest imaging  demonstrates grossly normal profusion throughout the remaining left  ventricle.      The left ventricle is normal in size.      Gated images demonstrate mild global hypokinesis with akinesis in the  apex with a post-stress LV EF estimated at 47%.      Attenuation correction CT images are nondiagnostic.        Impression    1. No evidence of inducible myocardial ischemia. Predominantly fixed  large-sized perfusion defects involving apex, apical to mid anterior  wall, apical to mid septal wall as well as apical lateral wall,  likely represents prior infarction. Decreased perfusion in the  inferior wall with preserved  wall motion and thickness, likely  artifacts.  2. The left ventricle is normal in size.  3. Gated images demonstrate mild global hypokinesis with akinesis in  the apex with a post-stress LV EF estimated at 47%.      I personally reviewed the images/study and I agree with the findings  as stated by Nuclear Medicine fellow Linda Kenney MD. This study  was interpreted at [place], [place].      MACRO:  None      Signed by: Gracie Ann 4/15/2024 1:53 PM  Dictation workstation:   [id number]       9:08 AM      Results were successfully communicated with the patient and they acknowledged their understanding.

## 2024-05-28 ENCOUNTER — OFFICE VISIT (OUTPATIENT)
Dept: PRIMARY CARE | Facility: CLINIC | Age: 74
End: 2024-05-28
Payer: MEDICARE

## 2024-05-28 ENCOUNTER — LAB (OUTPATIENT)
Dept: LAB | Facility: LAB | Age: 74
End: 2024-05-28
Payer: MEDICARE

## 2024-05-28 VITALS
DIASTOLIC BLOOD PRESSURE: 73 MMHG | HEART RATE: 106 BPM | SYSTOLIC BLOOD PRESSURE: 134 MMHG | TEMPERATURE: 97.4 F | HEIGHT: 67 IN | WEIGHT: 183 LBS | BODY MASS INDEX: 28.72 KG/M2

## 2024-05-28 DIAGNOSIS — I50.22 CHRONIC SYSTOLIC HEART FAILURE (MULTI): ICD-10-CM

## 2024-05-28 DIAGNOSIS — N18.31 CHRONIC RENAL IMPAIRMENT, STAGE 3A (MULTI): ICD-10-CM

## 2024-05-28 DIAGNOSIS — M25.473 ANKLE EDEMA: Primary | ICD-10-CM

## 2024-05-28 DIAGNOSIS — E78.49 OTHER HYPERLIPIDEMIA: ICD-10-CM

## 2024-05-28 DIAGNOSIS — Z79.4 DIABETES MELLITUS DUE TO UNDERLYING CONDITION WITH HYPEROSMOLARITY WITHOUT COMA, WITH LONG-TERM CURRENT USE OF INSULIN (MULTI): ICD-10-CM

## 2024-05-28 DIAGNOSIS — N39.3 STRESS INCONTINENCE OF URINE: ICD-10-CM

## 2024-05-28 DIAGNOSIS — F41.8 DEPRESSION WITH ANXIETY: ICD-10-CM

## 2024-05-28 DIAGNOSIS — I10 PRIMARY HYPERTENSION: ICD-10-CM

## 2024-05-28 DIAGNOSIS — I48.0 PAROXYSMAL ATRIAL FIBRILLATION (MULTI): ICD-10-CM

## 2024-05-28 DIAGNOSIS — Z00.00 MEDICARE ANNUAL WELLNESS VISIT, SUBSEQUENT: ICD-10-CM

## 2024-05-28 DIAGNOSIS — I63.10 CEREBROVASCULAR ACCIDENT (CVA) DUE TO EMBOLISM OF PRECEREBRAL ARTERY (MULTI): ICD-10-CM

## 2024-05-28 DIAGNOSIS — E08.00 DIABETES MELLITUS DUE TO UNDERLYING CONDITION WITH HYPEROSMOLARITY WITHOUT COMA, WITH LONG-TERM CURRENT USE OF INSULIN (MULTI): ICD-10-CM

## 2024-05-28 LAB
ALBUMIN SERPL BCP-MCNC: 3.8 G/DL (ref 3.4–5)
ALP SERPL-CCNC: 78 U/L (ref 33–136)
ALT SERPL W P-5'-P-CCNC: 13 U/L (ref 7–45)
ANION GAP SERPL CALC-SCNC: 15 MMOL/L (ref 10–20)
AST SERPL W P-5'-P-CCNC: 19 U/L (ref 9–39)
BILIRUB SERPL-MCNC: 0.6 MG/DL (ref 0–1.2)
BUN SERPL-MCNC: 25 MG/DL (ref 6–23)
CALCIUM SERPL-MCNC: 9.8 MG/DL (ref 8.6–10.6)
CHLORIDE SERPL-SCNC: 97 MMOL/L (ref 98–107)
CO2 SERPL-SCNC: 30 MMOL/L (ref 21–32)
CREAT SERPL-MCNC: 1.2 MG/DL (ref 0.5–1.05)
EGFRCR SERPLBLD CKD-EPI 2021: 47 ML/MIN/1.73M*2
GLUCOSE SERPL-MCNC: 301 MG/DL (ref 74–99)
POC HEMOGLOBIN A1C: 13.2 % (ref 4.2–6.5)
POTASSIUM SERPL-SCNC: 3.9 MMOL/L (ref 3.5–5.3)
PROT SERPL-MCNC: 6.9 G/DL (ref 6.4–8.2)
SODIUM SERPL-SCNC: 138 MMOL/L (ref 136–145)

## 2024-05-28 PROCEDURE — G0439 PPPS, SUBSEQ VISIT: HCPCS | Performed by: INTERNAL MEDICINE

## 2024-05-28 PROCEDURE — 99214 OFFICE O/P EST MOD 30 MIN: CPT | Performed by: INTERNAL MEDICINE

## 2024-05-28 PROCEDURE — 3075F SYST BP GE 130 - 139MM HG: CPT | Performed by: INTERNAL MEDICINE

## 2024-05-28 PROCEDURE — 1160F RVW MEDS BY RX/DR IN RCRD: CPT | Performed by: INTERNAL MEDICINE

## 2024-05-28 PROCEDURE — 83036 HEMOGLOBIN GLYCOSYLATED A1C: CPT | Performed by: INTERNAL MEDICINE

## 2024-05-28 PROCEDURE — 36415 COLL VENOUS BLD VENIPUNCTURE: CPT

## 2024-05-28 PROCEDURE — 80053 COMPREHEN METABOLIC PANEL: CPT

## 2024-05-28 PROCEDURE — 1036F TOBACCO NON-USER: CPT | Performed by: INTERNAL MEDICINE

## 2024-05-28 PROCEDURE — 1124F ACP DISCUSS-NO DSCNMKR DOCD: CPT | Performed by: INTERNAL MEDICINE

## 2024-05-28 PROCEDURE — 3078F DIAST BP <80 MM HG: CPT | Performed by: INTERNAL MEDICINE

## 2024-05-28 PROCEDURE — 1159F MED LIST DOCD IN RCRD: CPT | Performed by: INTERNAL MEDICINE

## 2024-05-28 PROCEDURE — 1170F FXNL STATUS ASSESSED: CPT | Performed by: INTERNAL MEDICINE

## 2024-05-28 RX ORDER — SEMAGLUTIDE 0.68 MG/ML
0.5 INJECTION, SOLUTION SUBCUTANEOUS
Qty: 3 ML | Refills: 1 | Status: SHIPPED | OUTPATIENT
Start: 2024-06-02

## 2024-05-28 ASSESSMENT — ENCOUNTER SYMPTOMS
PALPITATIONS: 0
CHEST TIGHTNESS: 0
ARTHRALGIAS: 0
DYSURIA: 0
UNEXPECTED WEIGHT CHANGE: 0
OCCASIONAL FEELINGS OF UNSTEADINESS: 0
NECK PAIN: 0
LOSS OF SENSATION IN FEET: 0
DIFFICULTY URINATING: 0
LIGHT-HEADEDNESS: 0
FREQUENCY: 0
APPETITE CHANGE: 0
WEAKNESS: 0
HEADACHES: 0
SLEEP DISTURBANCE: 0
DIZZINESS: 0
NERVOUS/ANXIOUS: 0
WHEEZING: 0
COUGH: 0
CHILLS: 0
FLANK PAIN: 0
ACTIVITY CHANGE: 0
ABDOMINAL PAIN: 0
BACK PAIN: 0
SHORTNESS OF BREATH: 0
DECREASED CONCENTRATION: 0
DEPRESSION: 0
SORE THROAT: 0
BLOOD IN STOOL: 0

## 2024-05-28 ASSESSMENT — ACTIVITIES OF DAILY LIVING (ADL)
GROCERY_SHOPPING: INDEPENDENT
BATHING: INDEPENDENT
TAKING_MEDICATION: INDEPENDENT
DRESSING: INDEPENDENT
MANAGING_FINANCES: INDEPENDENT
DOING_HOUSEWORK: INDEPENDENT

## 2024-05-28 ASSESSMENT — PATIENT HEALTH QUESTIONNAIRE - PHQ9
2. FEELING DOWN, DEPRESSED OR HOPELESS: NOT AT ALL
1. LITTLE INTEREST OR PLEASURE IN DOING THINGS: NOT AT ALL
SUM OF ALL RESPONSES TO PHQ9 QUESTIONS 1 AND 2: 0

## 2024-05-28 NOTE — PATIENT INSTRUCTIONS
You are seen today for nursing swelling in your ankles  start walking 15 to 20 minutes per day  elevate your feet while sitting and wear compression stockings  please furosemide 40 mg twice a week and continue 20 mg for the rest of the week you current medications and make appointment with cardiology  bloodwork today

## 2024-05-28 NOTE — PROGRESS NOTES
"Subjective   Patient ID: Keesha Franklin is a 74 y.o. female who presents for Medicare Annual Wellness Visit Subsequent (Pt present today for wellness visit. ls).    HPI patient is seen today for Medicare wellness visit. Her medical history is significant for hypertension, diabetes, coronary artery disease A fib,, history of CVA, hyperlipidemia, history of breast cancer. She is currently taking glipizide, Jardiance,  and Ozempic for diabetes. She complains of swelling in her ankles.    Review of Systems   Constitutional:  Negative for activity change, appetite change, chills and unexpected weight change.   HENT:  Negative for congestion, postnasal drip and sore throat.    Eyes:  Negative for visual disturbance.   Respiratory:  Negative for cough, chest tightness, shortness of breath and wheezing.    Cardiovascular:  Positive for leg swelling. Negative for chest pain and palpitations.        Ankle swelling   Gastrointestinal:  Negative for abdominal pain and blood in stool.   Endocrine: Negative for cold intolerance and heat intolerance.   Genitourinary:  Negative for difficulty urinating, dysuria, flank pain and frequency.   Musculoskeletal:  Negative for arthralgias, back pain, gait problem and neck pain.   Skin:  Negative for rash.   Allergic/Immunologic: Negative for environmental allergies and food allergies.   Neurological:  Negative for dizziness, weakness, light-headedness and headaches.   Psychiatric/Behavioral:  Negative for decreased concentration and sleep disturbance. The patient is not nervous/anxious.        Objective   /73 (BP Location: Right arm, Patient Position: Sitting)   Pulse 106   Temp 36.3 °C (97.4 °F)   Ht 1.689 m (5' 6.5\")   Wt 83 kg (183 lb)   BMI 29.09 kg/m²     Physical Exam  Vitals reviewed.   Constitutional:       General: She is not in acute distress.     Appearance: Normal appearance.   HENT:      Head: Normocephalic and atraumatic.      Mouth/Throat:      Mouth: Mucous " membranes are moist.   Eyes:      Extraocular Movements: Extraocular movements intact.      Conjunctiva/sclera: Conjunctivae normal.      Pupils: Pupils are equal, round, and reactive to light.   Cardiovascular:      Rate and Rhythm: Normal rate and regular rhythm.      Pulses: Normal pulses.   Pulmonary:      Effort: Pulmonary effort is normal. No respiratory distress.      Breath sounds: Normal breath sounds.   Abdominal:      General: Bowel sounds are normal. There is no distension.      Tenderness: There is no abdominal tenderness.   Musculoskeletal:         General: No swelling or tenderness. Normal range of motion.      Cervical back: Normal range of motion.   Skin:     General: Skin is warm.   Neurological:      General: No focal deficit present.      Mental Status: She is alert.      Coordination: Coordination normal.      Gait: Gait normal.   Psychiatric:         Mood and Affect: Mood normal.         Behavior: Behavior normal.         Assessment/Plan   Diagnoses and all orders for this visit:  Ankle edema  Comments:  Currently on furosemide 20 mg daily  take 40 mg twice a week  follow-up with cardiology  Cerebrovascular accident (CVA) due to embolism of precerebral artery (Multi)  Comments:  Chronic.  Continue aspirin, atorvastatin  Primary hypertension  Comments:  Blood pressure stable  continue current medications  Orders:  -     Comprehensive Metabolic Panel; Future  Other hyperlipidemia  Comments:  Continue atorvastatin  Chronic systolic heart failure (Multi)  Comments:  Continue metoprolol, isosorbide, furosemide, jardiance  Paroxysmal atrial fibrillation (Multi)  Comments:  Continue eloquence 5 mg BID  continue metoprolol  follow-up cardiology  Depression with anxiety  Comments:  Continue as citalopram  Stress incontinence of urine  Comments:  Incontinence pads ordered  Orders:  -     incontinence pad, liner, disp pad; 1 each 2 times a day.  Diabetes mellitus due to underlying condition with  hyperosmolarity without coma, with long-term current use of insulin (Multi)  Comments:  Hemoglobin A1c today above 13 worsening  increase Ozempic 0.5 mg every week  continue jardiance, refer to endocrinology  Orders:  -     POCT glycosylated hemoglobin (Hb A1C) manually resulted  -     Referral to Endocrinology; Future  -     Referral to Clinical Pharmacy; Future  -     semaglutide (Ozempic) 0.25 mg or 0.5 mg (2 mg/3 mL) pen injector; Inject 0.5 mg under the skin 1 (one) time per week.  Chronic renal impairment, stage 3a (Multi)  Comments:  Check renal function today  Orders:  -     Comprehensive Metabolic Panel; Future  Medicare annual wellness visit, subsequent  Comments:  Medicare wellness visit completed

## 2024-05-30 ENCOUNTER — TELEPHONE (OUTPATIENT)
Dept: PRIMARY CARE | Facility: CLINIC | Age: 74
End: 2024-05-30
Payer: MEDICARE

## 2024-05-30 DIAGNOSIS — R73.9 HYPERGLYCEMIA: ICD-10-CM

## 2024-05-30 DIAGNOSIS — E08.00 DIABETES MELLITUS DUE TO UNDERLYING CONDITION WITH HYPEROSMOLARITY WITHOUT NONKETOTIC HYPERGLYCEMIC-HYPEROSMOLAR COMA (NKHHC) (MULTI): ICD-10-CM

## 2024-05-30 DIAGNOSIS — I48.91 ATRIAL FIBRILLATION, UNSPECIFIED TYPE (MULTI): Primary | ICD-10-CM

## 2024-06-04 ENCOUNTER — APPOINTMENT (OUTPATIENT)
Dept: CARDIOLOGY | Facility: CLINIC | Age: 74
End: 2024-06-04
Payer: MEDICARE

## 2024-06-05 DIAGNOSIS — Z79.4 DIABETES MELLITUS DUE TO UNDERLYING CONDITION WITH HYPEROSMOLARITY WITHOUT COMA, WITH LONG-TERM CURRENT USE OF INSULIN (MULTI): ICD-10-CM

## 2024-06-05 DIAGNOSIS — E11.9 TYPE 2 DIABETES MELLITUS WITHOUT COMPLICATION, WITH LONG-TERM CURRENT USE OF INSULIN (MULTI): ICD-10-CM

## 2024-06-05 DIAGNOSIS — E08.00 DIABETES MELLITUS DUE TO UNDERLYING CONDITION WITH HYPEROSMOLARITY WITHOUT COMA, WITH LONG-TERM CURRENT USE OF INSULIN (MULTI): ICD-10-CM

## 2024-06-05 DIAGNOSIS — I10 PRIMARY HYPERTENSION: ICD-10-CM

## 2024-06-05 DIAGNOSIS — Z79.4 TYPE 2 DIABETES MELLITUS WITHOUT COMPLICATION, WITH LONG-TERM CURRENT USE OF INSULIN (MULTI): ICD-10-CM

## 2024-06-12 ENCOUNTER — APPOINTMENT (OUTPATIENT)
Dept: PHARMACY | Facility: HOSPITAL | Age: 74
End: 2024-06-12
Payer: MEDICARE

## 2024-06-28 ENCOUNTER — APPOINTMENT (OUTPATIENT)
Dept: PHARMACY | Facility: HOSPITAL | Age: 74
End: 2024-06-28
Payer: MEDICARE

## 2024-06-28 DIAGNOSIS — E08.00 DIABETES MELLITUS DUE TO UNDERLYING CONDITION WITH HYPEROSMOLARITY WITHOUT NONKETOTIC HYPERGLYCEMIC-HYPEROSMOLAR COMA (NKHHC) (MULTI): ICD-10-CM

## 2024-06-28 PROCEDURE — RXMED WILLOW AMBULATORY MEDICATION CHARGE

## 2024-06-28 RX ORDER — INSULIN GLARGINE 100 [IU]/ML
20-25 INJECTION, SOLUTION SUBCUTANEOUS NIGHTLY
Qty: 15 ML | Refills: 11 | Status: SHIPPED | OUTPATIENT
Start: 2024-06-28

## 2024-06-28 NOTE — PROGRESS NOTES
Pharmacist Clinic: Diabetes Management  Keesha Franklin is a 74 y.o. female was referred to Clinical Pharmacy Team for diabetes management.     Referring Provider: Gabriel Mccabe MD     HISTORY OF PRESENT ILLNESS  Approximate Date of Diagnosis: 1980    LAB REVIEW   Glucose (mg/dL)   Date Value   05/28/2024 301 (H)   02/23/2024 200 (H)   02/18/2024 466 (HH)     POC HEMOGLOBIN A1c (%)   Date Value   05/28/2024 13.2 (A)   12/05/2023 6.2     Hemoglobin A1C (%)   Date Value   02/23/2024 9.6 (H)   08/16/2023 7.2 (A)   12/07/2022 8.8 (A)   03/12/2021 8.3     Bicarbonate (mmol/L)   Date Value   05/28/2024 30   02/23/2024 30   02/18/2024 28     Urea Nitrogen (mg/dL)   Date Value   05/28/2024 25 (H)   02/23/2024 22   02/18/2024 45 (H)     Creatinine (mg/dL)   Date Value   05/28/2024 1.20 (H)   02/23/2024 1.46 (H)   02/18/2024 1.78 (H)     Lab Results   Component Value Date    HGBA1C 13.2 (A) 05/28/2024    HGBA1C 9.6 (H) 02/23/2024    HGBA1C 6.2 12/05/2023     Lab Results   Component Value Date    CHOL 165 02/23/2024    CHOL 145 12/07/2022    CHOL 151 03/12/2021     Lab Results   Component Value Date    HDL 62.4 02/23/2024    HDL 56.3 12/07/2022    HDL 54.1 03/12/2021     Lab Results   Component Value Date    LDLCALC 76 02/23/2024     Lab Results   Component Value Date    TRIG 135 02/23/2024    TRIG 88 12/07/2022    TRIG 99 03/12/2021       DIABETES ASSESSMENT    CURRENT PHARMACOTHERAPY  - Lantus 20-25 units nightly; no set scale will use based on what her numbers look like  - Glipizide XL 10 mg twice daily   - Ozempic 0.5 mg weekly; has noticed appetite reduction, had no side effects   - Jardiance 10 mg daily, 1 UTI in May, oral antibiotics and resolved    *Note patient is currently enrolled in  PAP for Eliquis*  Also reports significant copay for her other medications    PRIMARY/SECONDARY PREVENTION  - Statin? Yes  - ACE-I/ARB? Yes  - Aspirin? Yes  - Last eye exam: >1 year, encouraged to follow up  - Last diabetic foot  exam: checks self, never any issue    SMBG MEASUREMENTS  Dexcom   FBG 80s-110s    Patient does not report symptoms of hypoglycemia. Patient reports dizziness and fatigue  when she does experience rarely, manages with orange juice.  Patient does report symptoms of hyperglycemia. Patient reports polyuria.    Diet:   - Breakfast: coffee and breakfast sandwich (sausage cheese)  - Lunch: healthy choice meal   - Dinner: pork chops, vegetable, baked chicken  - Snacks: goldfish crackers   - Drinks: water, no soda or juice  Patient states she has been more aware of what she is eating since she found out her A1c was elevated and she has noticed her blood sugars have come down.  Reports that before she had her A1c checked it was in 200-300s (which is consistent with A1c result)    Exercise:   - knee pain, hasn't been able to do any exercise    RECOMMENDATIONS/PLAN  Patients diabetes is poorly controlled with most recent A1c of 13.2% (goal < 7 %).   INCREASE Ozempic to 1 mg per week, has two weeks left on 0.5 mg dose, will send prescription to [facility] for mail/request addition to  PAP  Continue Lantus 20-25 units nightly, will send prescription to [facility] for mail/request addition to  PAP  Continue Jardiance 10 mg daily, will send prescription to [facility] for mail/request addition to  PAP  Due to pill burden and likely limited benefit in comparison with other medications patient is taking for glycemic control, STOP glipizide XL 10 mg twice daily  Continue diet modifications   Continue using Dexcom to monitor blood sugars, take note of fasting blood sugars and readings approx 2 hours after a meal, document for review at follow up  Education:   Counseled patient on MOA, expectations, side effects, duration of therapy, contraindications, administration, and monitoring parameters  Answered all patient questions and concerns    Clinical Pharmacist follow up: 7/19/24 9203    Sierra Crocker, PharmD  Clinical Pharmacy  Specialist, Primary Care   [phone]    Continue all meds under the continuation of care with the referring provider and clinical pharmacy team.    Verbal consent to manage patient's drug therapy was obtained from [the patient or an individual authorized to act on behalf of a patient]. They were informed they may decline to participate or withdraw from participation in pharmacy services at any time.

## 2024-06-29 ENCOUNTER — PHARMACY VISIT (OUTPATIENT)
Dept: PHARMACY | Facility: CLINIC | Age: 74
End: 2024-06-29
Payer: MEDICARE

## 2024-07-01 ENCOUNTER — PHARMACY VISIT (OUTPATIENT)
Dept: PHARMACY | Facility: CLINIC | Age: 74
End: 2024-07-01

## 2024-07-03 ENCOUNTER — PHARMACY VISIT (OUTPATIENT)
Dept: PHARMACY | Facility: CLINIC | Age: 74
End: 2024-07-03
Payer: MEDICARE

## 2024-07-03 PROCEDURE — RXMED WILLOW AMBULATORY MEDICATION CHARGE

## 2024-07-08 ENCOUNTER — TELEPHONE (OUTPATIENT)
Dept: CARDIOLOGY | Facility: CLINIC | Age: 74
End: 2024-07-08

## 2024-07-08 ENCOUNTER — APPOINTMENT (OUTPATIENT)
Dept: PRIMARY CARE | Facility: CLINIC | Age: 74
End: 2024-07-08
Payer: MEDICARE

## 2024-07-08 DIAGNOSIS — F41.9 ANXIETY: Primary | ICD-10-CM

## 2024-07-08 RX ORDER — ALPRAZOLAM 1 MG/1
1 TABLET ORAL DAILY PRN
Qty: 3 TABLET | Refills: 0 | Status: SHIPPED | OUTPATIENT
Start: 2024-07-08 | End: 2024-07-12

## 2024-07-08 NOTE — TELEPHONE ENCOUNTER
Copied from CRM #[id number]. Topic: Information Request - Doctor, Hospital, or Provider  >> Jul 2, 2024 12:45 PM Abigail LAO wrote:  7/2/24-Spoke with patient; [phone];patient called to schedule her MRI Cardiac w/wo contrast; patient states she does have claustrophobia; patient asking if there is anyway someone from office can send a medication request to her pharmacy to help her not be nervous and anxious for her exam; advised patient we would need to send message to provider; if there is anyway someone from office can please reach out to patient to let her know if a medication can be sent to her pharmacy it would be greatly appreciated; patient's return PH is: [phone].

## 2024-07-09 ENCOUNTER — APPOINTMENT (OUTPATIENT)
Dept: CARDIOLOGY | Facility: CLINIC | Age: 74
End: 2024-07-09
Payer: MEDICARE

## 2024-07-12 ENCOUNTER — OFFICE VISIT (OUTPATIENT)
Dept: CARDIOLOGY | Facility: CLINIC | Age: 74
End: 2024-07-12
Payer: MEDICARE

## 2024-07-12 VITALS
OXYGEN SATURATION: 93 % | WEIGHT: 183.3 LBS | DIASTOLIC BLOOD PRESSURE: 81 MMHG | HEART RATE: 112 BPM | HEIGHT: 66 IN | BODY MASS INDEX: 29.46 KG/M2 | SYSTOLIC BLOOD PRESSURE: 137 MMHG

## 2024-07-12 DIAGNOSIS — I42.9 CARDIOMYOPATHY, UNSPECIFIED TYPE (MULTI): ICD-10-CM

## 2024-07-12 DIAGNOSIS — I50.20 HFREF (HEART FAILURE WITH REDUCED EJECTION FRACTION) (MULTI): ICD-10-CM

## 2024-07-12 DIAGNOSIS — I48.0 PAROXYSMAL ATRIAL FIBRILLATION (MULTI): Primary | ICD-10-CM

## 2024-07-12 LAB
ATRIAL RATE: 81 BPM
PR INTERVAL: 232 MS
Q ONSET: 217 MS
QRS COUNT: 13 BEATS
QRS DURATION: 110 MS
QT INTERVAL: 376 MS
QTC CALCULATION(BAZETT): 436 MS
QTC FREDERICIA: 415 MS
R AXIS: -61 DEGREES
T AXIS: 64 DEGREES
T OFFSET: 405 MS
VENTRICULAR RATE: 81 BPM

## 2024-07-12 PROCEDURE — 99215 OFFICE O/P EST HI 40 MIN: CPT | Performed by: INTERNAL MEDICINE

## 2024-07-12 PROCEDURE — 1036F TOBACCO NON-USER: CPT | Performed by: INTERNAL MEDICINE

## 2024-07-12 PROCEDURE — 1126F AMNT PAIN NOTED NONE PRSNT: CPT | Performed by: INTERNAL MEDICINE

## 2024-07-12 PROCEDURE — 1159F MED LIST DOCD IN RCRD: CPT | Performed by: INTERNAL MEDICINE

## 2024-07-12 PROCEDURE — 3079F DIAST BP 80-89 MM HG: CPT | Performed by: INTERNAL MEDICINE

## 2024-07-12 PROCEDURE — 93005 ELECTROCARDIOGRAM TRACING: CPT | Performed by: INTERNAL MEDICINE

## 2024-07-12 PROCEDURE — 3075F SYST BP GE 130 - 139MM HG: CPT | Performed by: INTERNAL MEDICINE

## 2024-07-12 PROCEDURE — 1123F ACP DISCUSS/DSCN MKR DOCD: CPT | Performed by: INTERNAL MEDICINE

## 2024-07-12 RX ORDER — FUROSEMIDE 20 MG/1
40 TABLET ORAL DAILY
Qty: 180 TABLET | Refills: 1 | Status: SHIPPED | OUTPATIENT
Start: 2024-07-12 | End: 2025-07-12

## 2024-07-12 RX ORDER — FUROSEMIDE 20 MG/1
20 TABLET ORAL DAILY
COMMUNITY
End: 2024-07-12 | Stop reason: SDUPTHER

## 2024-07-12 ASSESSMENT — ENCOUNTER SYMPTOMS
ALTERED MENTAL STATUS: 0
CHILLS: 0
CONSTIPATION: 0
FEVER: 0
ABDOMINAL PAIN: 0
COUGH: 0
HEADACHES: 0
MEMORY LOSS: 0
MYALGIAS: 0
NAUSEA: 0
DYSURIA: 0
HEMATURIA: 0
FALLS: 0
DEPRESSION: 0
VOMITING: 0
WHEEZING: 0
BLOATING: 0
HEMOPTYSIS: 0
DIARRHEA: 0

## 2024-07-12 ASSESSMENT — PAIN SCALES - GENERAL: PAINLEVEL: 0-NO PAIN

## 2024-07-19 ENCOUNTER — APPOINTMENT (OUTPATIENT)
Dept: PHARMACY | Facility: HOSPITAL | Age: 74
End: 2024-07-19
Payer: MEDICARE

## 2024-07-19 ENCOUNTER — LAB (OUTPATIENT)
Dept: LAB | Facility: LAB | Age: 74
End: 2024-07-19
Payer: MEDICARE

## 2024-07-19 ENCOUNTER — OFFICE VISIT (OUTPATIENT)
Dept: PRIMARY CARE | Facility: CLINIC | Age: 74
End: 2024-07-19
Payer: MEDICARE

## 2024-07-19 VITALS
WEIGHT: 180.6 LBS | DIASTOLIC BLOOD PRESSURE: 73 MMHG | TEMPERATURE: 98.1 F | BODY MASS INDEX: 29.52 KG/M2 | HEART RATE: 72 BPM | SYSTOLIC BLOOD PRESSURE: 131 MMHG

## 2024-07-19 DIAGNOSIS — E78.49 OTHER HYPERLIPIDEMIA: ICD-10-CM

## 2024-07-19 DIAGNOSIS — Z79.4 DIABETES MELLITUS DUE TO UNDERLYING CONDITION WITH HYPEROSMOLARITY WITHOUT COMA, WITH LONG-TERM CURRENT USE OF INSULIN (MULTI): ICD-10-CM

## 2024-07-19 DIAGNOSIS — E08.00 DIABETES MELLITUS DUE TO UNDERLYING CONDITION WITH HYPEROSMOLARITY WITHOUT COMA, WITH LONG-TERM CURRENT USE OF INSULIN (MULTI): ICD-10-CM

## 2024-07-19 DIAGNOSIS — I10 PRIMARY HYPERTENSION: ICD-10-CM

## 2024-07-19 DIAGNOSIS — Z79.4 LONG TERM (CURRENT) USE OF INSULIN (MULTI): ICD-10-CM

## 2024-07-19 DIAGNOSIS — E08.00 DIABETES MELLITUS DUE TO UNDERLYING CONDITION WITH HYPEROSMOLARITY WITHOUT NONKETOTIC HYPERGLYCEMIC-HYPEROSMOLAR COMA (NKHHC) (MULTI): Primary | ICD-10-CM

## 2024-07-19 DIAGNOSIS — I50.22 CHRONIC SYSTOLIC HEART FAILURE (MULTI): ICD-10-CM

## 2024-07-19 DIAGNOSIS — I10 PRIMARY HYPERTENSION: Primary | ICD-10-CM

## 2024-07-19 DIAGNOSIS — N18.31 CHRONIC RENAL IMPAIRMENT, STAGE 3A (MULTI): ICD-10-CM

## 2024-07-19 LAB
ALBUMIN SERPL BCP-MCNC: 4.1 G/DL (ref 3.4–5)
ALP SERPL-CCNC: 79 U/L (ref 33–136)
ALT SERPL W P-5'-P-CCNC: 18 U/L (ref 7–45)
ANION GAP SERPL CALC-SCNC: 13 MMOL/L (ref 10–20)
AST SERPL W P-5'-P-CCNC: 20 U/L (ref 9–39)
BILIRUB SERPL-MCNC: 1.4 MG/DL (ref 0–1.2)
BUN SERPL-MCNC: 21 MG/DL (ref 6–23)
CALCIUM SERPL-MCNC: 10.2 MG/DL (ref 8.6–10.6)
CHLORIDE SERPL-SCNC: 100 MMOL/L (ref 98–107)
CO2 SERPL-SCNC: 32 MMOL/L (ref 21–32)
CREAT SERPL-MCNC: 1.15 MG/DL (ref 0.5–1.05)
CREAT UR-MCNC: 136.8 MG/DL (ref 20–320)
EGFRCR SERPLBLD CKD-EPI 2021: 49 ML/MIN/1.73M*2
ERYTHROCYTE [DISTWIDTH] IN BLOOD BY AUTOMATED COUNT: 15.5 % (ref 11.5–14.5)
EST. AVERAGE GLUCOSE BLD GHB EST-MCNC: 189 MG/DL
GLUCOSE SERPL-MCNC: 121 MG/DL (ref 74–99)
HBA1C MFR BLD: 8.2 %
HCT VFR BLD AUTO: 39.9 % (ref 36–46)
HGB BLD-MCNC: 12.4 G/DL (ref 12–16)
MCH RBC QN AUTO: 27 PG (ref 26–34)
MCHC RBC AUTO-ENTMCNC: 31.1 G/DL (ref 32–36)
MCV RBC AUTO: 87 FL (ref 80–100)
MICROALBUMIN UR-MCNC: 152.2 MG/L
MICROALBUMIN/CREAT UR: 111.3 UG/MG CREAT
NRBC BLD-RTO: 0 /100 WBCS (ref 0–0)
PLATELET # BLD AUTO: 207 X10*3/UL (ref 150–450)
POTASSIUM SERPL-SCNC: 3.7 MMOL/L (ref 3.5–5.3)
PROT SERPL-MCNC: 7.4 G/DL (ref 6.4–8.2)
RBC # BLD AUTO: 4.6 X10*6/UL (ref 4–5.2)
SODIUM SERPL-SCNC: 141 MMOL/L (ref 136–145)
WBC # BLD AUTO: 4 X10*3/UL (ref 4.4–11.3)

## 2024-07-19 PROCEDURE — 83036 HEMOGLOBIN GLYCOSYLATED A1C: CPT

## 2024-07-19 PROCEDURE — 36415 COLL VENOUS BLD VENIPUNCTURE: CPT

## 2024-07-19 PROCEDURE — 1124F ACP DISCUSS-NO DSCNMKR DOCD: CPT | Performed by: INTERNAL MEDICINE

## 2024-07-19 PROCEDURE — 82043 UR ALBUMIN QUANTITATIVE: CPT

## 2024-07-19 PROCEDURE — 99214 OFFICE O/P EST MOD 30 MIN: CPT | Performed by: INTERNAL MEDICINE

## 2024-07-19 PROCEDURE — 85027 COMPLETE CBC AUTOMATED: CPT

## 2024-07-19 PROCEDURE — 80053 COMPREHEN METABOLIC PANEL: CPT

## 2024-07-19 PROCEDURE — 1036F TOBACCO NON-USER: CPT | Performed by: INTERNAL MEDICINE

## 2024-07-19 PROCEDURE — 82570 ASSAY OF URINE CREATININE: CPT

## 2024-07-19 PROCEDURE — 1126F AMNT PAIN NOTED NONE PRSNT: CPT | Performed by: INTERNAL MEDICINE

## 2024-07-19 PROCEDURE — 3078F DIAST BP <80 MM HG: CPT | Performed by: INTERNAL MEDICINE

## 2024-07-19 PROCEDURE — 3075F SYST BP GE 130 - 139MM HG: CPT | Performed by: INTERNAL MEDICINE

## 2024-07-19 ASSESSMENT — PAIN SCALES - GENERAL: PAINLEVEL: 0-NO PAIN

## 2024-07-19 ASSESSMENT — ENCOUNTER SYMPTOMS
HEADACHES: 0
SORE THROAT: 0
WHEEZING: 0
BACK PAIN: 0
CHILLS: 0
ARTHRALGIAS: 0
ABDOMINAL PAIN: 0
FREQUENCY: 0
SHORTNESS OF BREATH: 0
DYSURIA: 0
WEAKNESS: 0
NECK PAIN: 0
LIGHT-HEADEDNESS: 0
FLANK PAIN: 0
CHEST TIGHTNESS: 0
SLEEP DISTURBANCE: 0
ACTIVITY CHANGE: 0
UNEXPECTED WEIGHT CHANGE: 0
NERVOUS/ANXIOUS: 0
BLOOD IN STOOL: 0
COUGH: 0
PALPITATIONS: 0
DECREASED CONCENTRATION: 0
DIZZINESS: 0
APPETITE CHANGE: 0
DIFFICULTY URINATING: 0

## 2024-07-19 NOTE — PATIENT INSTRUCTIONS
Lower amlodipine to 2.5 mg if needed for edema  Wear compression stockings  C/w current meds  Follow in 4 months

## 2024-07-19 NOTE — PROGRESS NOTES
Subjective   Patient ID: Keesha Franklin is a 74 y.o. female who presents for Follow-up.    HPI  pt c/o leg swelling. Seen by cardiology recently. Dose of furosemide increased to 40 daily. She stopped jardiance 2 weeks ago as he herd about the side effects of swelling.   No cp/sob.  H/o HTN,CKD, Chr sys HF.,Afib/AF, depressin,h/o CVA, dysarthria.    Review of Systems   Constitutional:  Negative for activity change, appetite change, chills and unexpected weight change.   HENT:  Negative for congestion, postnasal drip and sore throat.    Eyes:  Negative for visual disturbance.   Respiratory:  Negative for cough, chest tightness, shortness of breath and wheezing.    Cardiovascular:  Positive for leg swelling. Negative for chest pain and palpitations.        B/l leg swelling   Gastrointestinal:  Negative for abdominal pain and blood in stool.   Endocrine: Negative for cold intolerance and heat intolerance.   Genitourinary:  Negative for difficulty urinating, dysuria, flank pain and frequency.   Musculoskeletal:  Negative for arthralgias, back pain, gait problem and neck pain.   Skin:  Negative for rash.   Allergic/Immunologic: Negative for environmental allergies and food allergies.   Neurological:  Negative for dizziness, weakness, light-headedness and headaches.   Psychiatric/Behavioral:  Negative for decreased concentration and sleep disturbance. The patient is not nervous/anxious.        Objective   /73 (BP Location: Right arm, Patient Position: Sitting, BP Cuff Size: Adult)   Pulse 72   Temp 36.7 °C (98.1 °F) (Oral)   Wt 81.9 kg (180 lb 9.6 oz)   BMI 29.52 kg/m²     Physical Exam  Vitals reviewed.   Constitutional:       General: She is not in acute distress.     Appearance: Normal appearance.   HENT:      Head: Normocephalic and atraumatic.      Mouth/Throat:      Mouth: Mucous membranes are moist.   Cardiovascular:      Rate and Rhythm: Normal rate and regular rhythm.      Pulses: Normal pulses.    Pulmonary:      Effort: Pulmonary effort is normal. No respiratory distress.      Breath sounds: Normal breath sounds.   Abdominal:      General: Bowel sounds are normal. There is no distension.      Tenderness: There is no abdominal tenderness.   Musculoskeletal:         General: Swelling present. No tenderness. Normal range of motion.      Comments: B/l leg and ankle edema   Skin:     General: Skin is warm.   Neurological:      General: No focal deficit present.      Mental Status: She is alert.      Coordination: Coordination normal.      Gait: Gait normal.   Psychiatric:         Mood and Affect: Mood normal.         Behavior: Behavior normal.         Assessment/Plan   Diagnoses and all orders for this visit:  Primary hypertension  Comments:  lower amlodipine to 2.5 due to edema  c/w furosemide, vals-hctz  Orders:  -     CBC; Future  -     Comprehensive Metabolic Panel; Future  Chronic systolic heart failure (Multi)  Comments:  c/w current meds  Other hyperlipidemia  Chronic renal impairment, stage 3a (Multi)  Comments:  check blood work today  Orders:  -     Comprehensive Metabolic Panel; Future  Diabetes mellitus due to underlying condition with hyperosmolarity without coma, with long-term current use of insulin (Multi)  Comments:  c/w Ozempic, lantus, glipizide  check hba1c  Orders:  -     Hemoglobin A1C; Future    Follow in 3-4 mths      FAMILY HISTORY:  No pertinent family history in first degree relatives

## 2024-07-22 PROCEDURE — RXMED WILLOW AMBULATORY MEDICATION CHARGE

## 2024-07-23 ENCOUNTER — TELEPHONE (OUTPATIENT)
Dept: PRIMARY CARE | Facility: CLINIC | Age: 74
End: 2024-07-23
Payer: MEDICARE

## 2024-07-24 ENCOUNTER — PHARMACY VISIT (OUTPATIENT)
Dept: PHARMACY | Facility: CLINIC | Age: 74
End: 2024-07-24
Payer: MEDICARE

## 2024-08-02 ENCOUNTER — APPOINTMENT (OUTPATIENT)
Dept: PHARMACY | Facility: HOSPITAL | Age: 74
End: 2024-08-02
Payer: MEDICARE

## 2024-08-16 ENCOUNTER — APPOINTMENT (OUTPATIENT)
Dept: PHARMACY | Facility: HOSPITAL | Age: 74
End: 2024-08-16
Payer: COMMERCIAL

## 2024-08-16 NOTE — PROGRESS NOTES
Pharmacist Clinic: Cardiology Management    Keesha Franklin is a 74 y.o. female was referred to Clinical Pharmacy Team for anticoagulation management.     Referring Provider: Juancarlos Browning MD    THIS IS A FOLLOW UP PATIENT APPOINTMENT. AT LAST VISIT ON 2/12/24 WITH PHARMACIST (Abigail Coto).    Appointment was completed by Keesha who was reached at primary number.    REVIEW OF PAST APPNT (IF APPLICABLE):   Patient was previously screened and approved to receiving Eliquis through [facility] with a renewal date of 4/25/25.  During last appointment patient was newly started on Eliquis. Only complaint was that they reported having more frequent urination at the time.      Allergies   Allergen Reactions    Jardiance [Empagliflozin] Swelling     Legs become swollen and painful with shooting pains    Lisinopril Angioedema and Unknown       Past Medical History:   Diagnosis Date    Adjustment disorder with depressed mood 11/11/2014    Grief reaction    Body mass index (BMI) 35.0-35.9, adult 08/30/2021    Body mass index (BMI) of 35.0 to 35.9 in adult    Cardiac murmur, unspecified 06/18/2021    Systolic murmur    Cerebral infarction, unspecified (Multi) 10/25/2022    Acute cerebrovascular accident (CVA)    Chronic kidney disease, stage 3a (Multi) 04/19/2022    Chronic renal impairment, stage 3a    Encounter for general adult medical examination without abnormal findings 01/28/2021    Medicare annual wellness visit, subsequent    Encounter for screening for malignant neoplasm of colon 11/07/2019    Colon cancer screening    Encounter for screening for malignant neoplasm of colon 01/28/2021    Colon cancer screening    Essential (primary) hypertension 10/25/2022    Benign essential hypertension    Mammographic microcalcification found on diagnostic imaging of breast 09/09/2014    Abnormal mammogram with microcalcification    Menopausal and female climacteric states 11/07/2019    Menopause syndrome    Obesity, unspecified  06/30/2022    Class 2 obesity with body mass index (BMI) of 35.0 to 35.9 in adult    Pain in left foot 08/01/2022    Left foot pain    Pain in left hip 12/20/2016    Left hip pain    Personal history of other endocrine, nutritional and metabolic disease 10/25/2022    History of diabetes mellitus    Polyneuropathy, unspecified 08/01/2022    Peripheral neuropathy    Primary osteoarthritis, unspecified ankle and foot 08/01/2022    Arthritis of foot    Type 2 diabetes mellitus with diabetic chronic kidney disease (Multi) 08/30/2021    Diabetes mellitus with stage 3a chronic kidney disease, without long-term current use of insulin       Current Outpatient Medications on File Prior to Visit   Medication Sig Dispense Refill    ALPRAZolam (Xanax) 1 mg tablet Take 1 tablet (1 mg) by mouth once daily as needed for anxiety (take 1-2 hours prior to MRI - if still anxious prior to test, can take a second one right before) for up to 3 days. 3 tablet 0    amLODIPine (Norvasc) 5 mg tablet Take 1 tablet (5 mg) by mouth once daily. 90 tablet 1    apixaban (Eliquis) 5 mg tablet Take 1 tablet (5 mg) by mouth 2 times a day. 180 tablet 3    aspirin 81 mg EC tablet Take 1 tablet (81 mg) by mouth once daily.      atorvastatin (Lipitor) 80 mg tablet Take 1 tablet (80 mg) by mouth once daily at bedtime. 90 tablet 1    blood sugar diagnostic (Accu-Chek Tiffanie Plus test strp) strip USE TO TEST THREE TIMES A DAY      blood-glucose sensor (Dexcom G7 Sensor) device 1 kit once daily. 1 each 1    escitalopram (Lexapro) 10 mg tablet Take 1 tablet (10 mg) by mouth once daily. 90 tablet 1    furosemide (Lasix) 20 mg tablet Take 2 tablets (40 mg) by mouth once daily. 180 tablet 1    gabapentin (Neurontin) 100 mg capsule Take 1 capsule (100 mg) by mouth 3 times a day.      glipiZIDE XL (Glucotrol XL) 10 mg 24 hr tablet Take by mouth twice a day.      incontinence pad, liner, disp pad 1 each 2 times a day. 100 each 3    insulin glargine (Lantus Solostar  "U-100 Insulin) 100 unit/mL (3 mL) pen INJECT 18-20 UNITS SUBCUTANEOUSLY DAILY 15 mL 0    insulin glargine (Lantus Solostar U-100 Insulin) 100 unit/mL (3 mL) pen Inject 20-25 Units under the skin once daily at bedtime. Take as directed per insulin instructions. 15 mL 11    isosorbide mononitrate ER (Imdur) 60 mg 24 hr tablet Take 1 tablet (60 mg) by mouth once daily. Do not crush or chew. 90 tablet 1    metoprolol succinate XL (Toprol-XL) 25 mg 24 hr tablet Take 0.5 tablets (12.5 mg) by mouth once daily at bedtime. 90 tablet 1    nitroglycerin (Nitrostat) 0.4 mg SL tablet Place 1 tablet (0.4 mg) under the tongue every 5 minutes if needed for chest pain. Up to 3 times.      pen needle, diabetic (BD Ultra-Fine Sameera Pen Needle) 32 gauge x 5/32\" needle USE AS DIRECTED TO TEST THREE TIMES A  each 5    potassium chloride CR (Klor-Con) 10 mEq ER tablet Take 1 tablet (10 mEq) by mouth once daily. Do not crush, chew, or split. 90 tablet 3    semaglutide (OZEMPIC) 1 mg/dose (4 mg/3 mL) pen injector Inject 1 mg under the skin 1 (one) time per week. 3 mL 11    sodium hyaluronate (Durolane) 60 mg/3 mL injection Inject into the joint.      valsartan-hydrochlorothiazide (Diovan-HCT) 160-25 mg tablet Take 1 tablet by mouth once daily. 90 tablet 1     No current facility-administered medications on file prior to visit.         RELEVANT LAB RESULTS  Lab Results   Component Value Date    BILITOT 1.4 (H) 07/19/2024    CALCIUM 10.2 07/19/2024    CO2 32 07/19/2024     07/19/2024    CREATININE 1.15 (H) 07/19/2024    GLUCOSE 121 (H) 07/19/2024    ALKPHOS 79 07/19/2024    K 3.7 07/19/2024    PROT 7.4 07/19/2024     07/19/2024    AST 20 07/19/2024    ALT 18 07/19/2024    BUN 21 07/19/2024    ANIONGAP 13 07/19/2024    MG 3.00 (H) 02/18/2024    ALBUMIN 4.1 07/19/2024    GFRF 43 (A) 08/16/2023     Lab Results   Component Value Date    TRIG 135 02/23/2024    CHOL 165 02/23/2024    LDLCALC 76 02/23/2024    HDL 62.4 02/23/2024 " "    No results found for: \"BMCBC\", \"CBCDIF\"     PHARMACEUTICAL ASSESSMENT    Medication Documentation Review Audit       Reviewed by GILA Carrington on 07/12/24 at 1049      Medication Order Taking? Sig Documenting Provider Last Dose Status   ALPRAZolam (Xanax) 1 mg tablet [id number] Yes Take 1 tablet (1 mg) by mouth once daily as needed for anxiety (take 1-2 hours prior to MRI - if still anxious prior to test, can take a second one right before) for up to 3 days. Juancarlos Browning MD Taking Active   amLODIPine (Norvasc) 5 mg tablet [id number] Yes Take 1 tablet (5 mg) by mouth once daily. Gabriel Mccabe MD Taking Active   apixaban (Eliquis) 5 mg tablet [id number] Yes Take 1 tablet (5 mg) by mouth 2 times a day. Juancarlos Browning MD Taking Active   aspirin 81 mg EC tablet [id number] Yes Take 1 tablet (81 mg) by mouth once daily. Historical Provider, MD Taking Active   atorvastatin (Lipitor) 80 mg tablet [id number] Yes Take 1 tablet (80 mg) by mouth once daily at bedtime. Gabriel Mccabe MD Taking Active   blood sugar diagnostic (Accu-Chek Tiffanie Plus test strp) strip [id number] Yes USE TO TEST THREE TIMES A DAY Historical Provider, MD Taking Active   blood-glucose sensor (Dexcom G7 Sensor) device [id number] Yes 1 kit once daily. Gabriel Mccabe MD Taking Active   empagliflozin (Jardiance) 10 mg [id number] No Take 1 tablet (10 mg) by mouth once daily.   Patient not taking: Reported on 7/12/2024    Gabriel Mccabe MD Not Taking Active   escitalopram (Lexapro) 10 mg tablet [id number] Yes Take 1 tablet (10 mg) by mouth once daily. Gabriel Mccabe MD Taking Active   furosemide (Lasix) 20 mg tablet [id number] No Take 1 tablet (20 mg) by mouth once daily.   Patient not taking: Reported on 7/12/2024    Gabriel Mccabe MD Not Taking Flag for Review   gabapentin (Neurontin) 100 mg capsule [id number] Yes Take 1 capsule (100 mg) by mouth 3 times a day. Historical Provider, MD Taking Active   glipiZIDE XL (Glucotrol XL) 10 mg 24 " "hr tablet [id number] Yes Take by mouth twice a day. Historical Provider, MD Taking Active   incontinence pad, liner, disp pad [id number] Yes 1 each 2 times a day. Gabriel Mccabe MD Taking Active   insulin glargine (Lantus Solostar U-100 Insulin) 100 unit/mL (3 mL) pen [id number] Yes INJECT 18-20 UNITS SUBCUTANEOUSLY DAILY Julio Driver,  Taking Active   insulin glargine (Lantus Solostar U-100 Insulin) 100 unit/mL (3 mL) pen [id number] Yes Inject 20-25 Units under the skin once daily at bedtime. Take as directed per insulin instructions. Gabriel Mccabe MD Taking Active   isosorbide mononitrate ER (Imdur) 60 mg 24 hr tablet [id number] Yes Take 1 tablet (60 mg) by mouth once daily. Do not crush or chew. Gabriel Mccabe MD Taking Active   Jardiance 10 mg [id number] No Take 1 tablet (10 mg) by mouth once daily.   Patient not taking: Reported on 7/12/2024    Gabriel Mccabe MD Not Taking Active   metoprolol succinate XL (Toprol-XL) 25 mg 24 hr tablet [id number] Yes Take 0.5 tablets (12.5 mg) by mouth once daily at bedtime. Gabriel Mccabe MD Taking Active   nitroglycerin (Nitrostat) 0.4 mg SL tablet [id number] Yes Place 1 tablet (0.4 mg) under the tongue every 5 minutes if needed for chest pain. Up to 3 times. Historical Provider, MD Taking Active   pen needle, diabetic (BD Ultra-Fine Sameera Pen Needle) 32 gauge x 5/32\" needle [id number] Yes USE AS DIRECTED TO TEST THREE TIMES A DAY Gabriel Mccabe MD Taking Active   potassium chloride CR (Klor-Con) 10 mEq ER tablet [id number] Yes Take 1 tablet (10 mEq) by mouth once daily. Do not crush, chew, or split. Juancarlos Browning MD Taking Active   semaglutide (Ozempic) 0.25 mg or 0.5 mg (2 mg/3 mL) pen injector [id number] No Inject 0.5 mg under the skin 1 (one) time per week.   Patient not taking: Reported on 7/12/2024    Gabriel Mccabe MD Not Taking Flag for Review   semaglutide (OZEMPIC) 1 mg/dose (4 mg/3 mL) pen injector [id number] Yes Inject 1 mg under the skin 1 (one) " time per week. Gabriel Mccabe MD Taking Active   sodium hyaluronate (Durolane) 60 mg/3 mL injection [id number] Yes Inject into the joint. Historical Provider, MD Taking Active   valsartan-hydrochlorothiazide (Diovan-HCT) 160-25 mg tablet [id number] Yes Take 1 tablet by mouth once daily. Gabriel Mccabe MD Taking Active                    DISEASE MANAGEMENT ASSESSMENT:     ANTICOAGULATION ASSESSMENT    The ASCVD Risk score (Tanya DK, et al., 2019) failed to calculate for the following reasons:    The patient has a prior MI or stroke diagnosis    DIAGNOSIS: prevention of nonvalvular atrial fibrilliation stroke and systemic embolism  - Patient is projected to be on anticoagulation indefinitely  - [id number] Score: [9] (only included if diagnosis is atrial fibrillation)   Age: [<65 (0)] [65-74 (+1)] [> 75 (+2)]: 2  Sex: [Male/Female (+1)]: 1  CHF history: [No/Yes(+1)]: 1  Hypertension history: [No/Yes(+1)]: 1  Stroke/TIA/thromboembolism history: [No/Yes(+2)]: 2  Vascular disease history (prior MI, peripheral artery disease, aortic plaque): [No/Yes(+1)]: 1  Diabetes history: [No/Yes(+1)]: 1    CURRENT PHARMACOTHERAPY:    Eliquis 5mg twice daily which is appropriate for a patient whose Scr is 1.15mg/dl, 74 yoa, and weighs 81.9kg.    Affordability/Accessibility:  PAP  Adherence/Organization: reports only taking morning dose.  Adverse Reactions: none reported - some bruising  Recent Hospitalizations: none reported  Recent Falls/Trauma: none reported - 1 story home  Changes in Tobacco or Alcohol Intake:   Tobacco: does not use  Alcohol: one drink a week    EDUCATION/COUNSELING:   - Counseled patient on MOA, expectations, duration of therapy, contraindications, administration, and monitoring parameters  - Counseled patient of side effects that are indicative of bleeding such as dark tarry stool, unexplainable bruising, or vomiting up a coffee ground like substance      DISCUSSION/NOTES:   Patient reports that she did  not feel the need to take the Eliquis twice daily, and did not understand why she would need to use the medications twice a day. Discussed the importance of using Eliquis two times a day to help cover blood thinning effect for the full day. She understands and reported she will start taking twice a day. Reports she did not have any recent signs or symptoms of blood clots since the last visit.  She has some bruising during the day, but reports that all bruises heal quickly and without concern.    ASSESSMENT AND PLAN:    Assessment/Plan   Problem List Items Addressed This Visit       A-fib (Multi) - Primary     No dose adjustment needed for Eliquis based on their age, weight, and kidney function              RECOMMENDATIONS/PLAN    START  Eliquis 5mg BID  Patient reported only taking once daily previously    Next Cardiology Appointment: 9/17/24  Clinical Pharmacist follow up: 2/21/25  VAF/Application Expiration: Yes    Date: 4/9/25  Type of Encounter: Dante Magaña PharmD  PGY1 Resident     Verbal consent to manage patient's drug therapy was obtained from the patient . They were informed they may decline to participate or withdraw from participation in pharmacy services at any time.    Continue all meds under the continuation of care with the referring provider and clinical pharmacy team.

## 2024-08-18 ASSESSMENT — CHA2DS2 SCORE
CHF OR LEFT VENTRICULAR DYSFUNCTION: YES
DIABETES: YES
HYPERTENSION: YES
VASCULAR DISEASE: YES
SEX: FEMALE
CHA2D2S VASC SCORE: 9
PRIOR STROKE OR TIA OR THROMBOEMBOLISM: YES
AGE IN YEARS: 75+

## 2024-08-18 NOTE — PROGRESS NOTES
"Current Outpatient Medications   Medication Instructions    ALPRAZolam (XANAX) 1 mg, oral, Daily PRN    amLODIPine (NORVASC) 5 mg, oral, Daily    aspirin 81 mg EC tablet 1 tablet, oral, Daily    atorvastatin (LIPITOR) 80 mg, oral, Nightly    blood sugar diagnostic (Accu-Chek Tiffanie Plus test strp) strip USE TO TEST THREE TIMES A DAY    blood-glucose sensor (Dexcom G7 Sensor) device 1 kit, miscellaneous, Daily    Eliquis 5 mg, oral, 2 times daily    escitalopram (LEXAPRO) 10 mg, oral, Daily    furosemide (LASIX) 40 mg, oral, Daily    gabapentin (NEURONTIN) 100 mg, oral, 3 times daily    glipiZIDE XL (Glucotrol XL) 10 mg 24 hr tablet oral, 2 times daily    incontinence pad, liner, disp pad 1 each, miscellaneous, 2 times daily    insulin glargine (Lantus Solostar U-100 Insulin) 100 unit/mL (3 mL) pen INJECT 18-20 UNITS SUBCUTANEOUSLY DAILY    isosorbide mononitrate ER (IMDUR) 60 mg, oral, Daily, Do not crush or chew.    Lantus Solostar U-100 Insulin 20-25 Units, subcutaneous, Nightly, Take as directed per insulin instructions.    metoprolol succinate XL (TOPROL-XL) 12.5 mg, oral, Nightly    nitroglycerin (NITROSTAT) 0.4 mg, sublingual, Every 5 min PRN, Up to 3 times.    Ozempic 1 mg, subcutaneous, Once Weekly    pen needle, diabetic (BD Ultra-Fine Sameera Pen Needle) 32 gauge x 5/32\" needle USE AS DIRECTED TO TEST THREE TIMES A DAY    potassium chloride CR (Klor-Con) 10 mEq ER tablet 10 mEq, oral, Daily, Do not crush, chew, or split.    sodium hyaluronate (Durolane) 60 mg/3 mL injection intra-articular    valsartan-hydrochlorothiazide (Diovan-HCT) 160-25 mg tablet 1 tablet, oral, Daily      Subjective   Keesha Franklin is a 74 y.o. female.    Chief Complaint:  Atrial Fibrillation and Cardiomyopathy  [id number] Score: 9     HPI    Patient is referred by Dr. Juancarlos Browning for AF/A-flutter, possible RFA vs antiarrhythmic medications.     PMH includes: CAD s/p D2 POBA 1/19 (LAD ), s/p MI 1/19, HFrEF/CM, parox AF/FL, HTN, " HLD, DM, CVA 9/17 (no resid), CKD, breast Ca s/p mast, LIYA, peripheral neuropathy    The patient had a questionable diagnosis of AF in 2018 but was officially diagnosed in January 2024. She was started on Eliquis due to her CHADSVASc score of 9 and is also on metoprolol. She has never undergone cardioversion or been treated with antiarrhythmic medications. She experienced a left hemispheric embolic stroke in September 2017 with no residual effects.    In January 2024, an event monitor revealed an AF burden of 56%. A stress test in April 2024 showed no evidence of ischemia and a LVEF of 47% with fixed large-sized perfusion defects involving the apex, apical to mid anterior wall, apical to mid septal wall, and apical lateral wall, likely representing prior infarction. An echocardiogram in February 2024 showed an EF of 35-40% with a probable small right atrial mass on the interventricular septum. An earlier echocardiogram in June 2021 showed a normal EF of 55-60%. She is scheduled for a cardiac MRI on October 15, 2024, to further assess the right atrial mass and cardiomyopathy.    The patient denies orthopnea or PND and palpitations. She reports occasional brief lightheadedness on standing, without syncope, and has lower extremity edema. She presents today to discuss further treatment options for her AFib, including RFA or antiarrhythmic medications    TESTING    STRESS TEST 04/15/24:  Impression   1. No evidence of inducible myocardial ischemia. Predominantly fixed  large-sized perfusion defects involving apex, apical to mid anterior  wall, apical to mid septal wall as well as apical lateral wall,  likely represents prior infarction. Decreased perfusion in the  inferior wall with preserved wall motion and thickness, likely  artifacts.  2. The left ventricle is normal in size.  3. Gated images demonstrate mild global hypokinesis with akinesis in  the apex with a post-stress LV EF estimated at 47%.        -ECHO  02/07/24:  CONCLUSIONS:   1. Left ventricular systolic function is moderately decreased with a 35-40% estimated ejection fraction.   2. Basal and mid inferior wall, apical anterior segment, and apex are abnormal.   3. Spectral Doppler shows an abnormal pattern of left ventricular diastolic filling.   4. The left atrium is moderately dilated.   5. Probable small right atrial mass, on the interventricular septum. Consider ALEKSANDRA or cardiac MRI for additional characterization.   6. Mild to moderate mitral valve regurgitation.   7. Slightly elevated RVSP.   8. Compared with the prior study dated 6/21/2021, ejection fraction is reported lower in the current study. However contrast was not used in the prior study; direct comparison suggests the anterior wall is similar to prior, while the inferior wall appears more hypokinetic. The probable right atrial mass was not clearly seen in the prior study.    EVENT MONITOR 01/29/24:  The predominant rhythm was Atrial Fibrillation/Flutter Leonidas with Frequent Supraventricular Ectopy.  ?The Maximum Heart Rate recorded was 128 bpm, 02/03 21:29:26, the Minimum Heart Rate recorded was 31 bpm, 01/29 18:07:07, and the Average Heart Rate was 62 bpm.  ?There were 5,831 VE beats with a burden of <1%.  ?There were 40,710 SVE beats with a burden of 6%. There were 691 occurrences of Supraventricular Tachycardia with the Fastest episode 128 bpm, 02/03 21:29:03, and the Longest episode 48.9s, 02/03 21:29:03.  ?The study included an Atrial Fibrillation/Flutter Leonidas of 56% with <1% in RVR and 70% in SVR. The longest episode was 3d 21h 38m 19.1s, 01/29 13:09:14, and the Fastest episode was 108 bpm, 01/29 13:09:14.  ?Other rhythms in this study include: 2:1 AV Block, Second Degree AV Block Type I.  ?There were 0 Patient Triggers.    HEART CATH -06/11/21:  CONCLUSIONS:   1. Severe single vessel and branch vessel CAD in a right dominant system.   2. Elevated LVEDP.   3. No aortic  stenosis.    ROS    Objective   Physical Exam        Assessment/Plan   {There were no encounter diagnoses. (Refresh or delete this SmartLink)}

## 2024-08-19 ENCOUNTER — OFFICE VISIT (OUTPATIENT)
Dept: CARDIOLOGY | Facility: CLINIC | Age: 74
End: 2024-08-19
Payer: COMMERCIAL

## 2024-08-19 DIAGNOSIS — I48.0 PAROXYSMAL ATRIAL FIBRILLATION (MULTI): Primary | ICD-10-CM

## 2024-08-19 PROCEDURE — RXMED WILLOW AMBULATORY MEDICATION CHARGE

## 2024-08-21 ENCOUNTER — PHARMACY VISIT (OUTPATIENT)
Dept: PHARMACY | Facility: CLINIC | Age: 74
End: 2024-08-21
Payer: MEDICARE

## 2024-08-21 ENCOUNTER — APPOINTMENT (OUTPATIENT)
Dept: PHARMACY | Facility: HOSPITAL | Age: 74
End: 2024-08-21
Payer: MEDICARE

## 2024-08-21 DIAGNOSIS — E08.00 DIABETES MELLITUS DUE TO UNDERLYING CONDITION WITH HYPEROSMOLARITY WITHOUT NONKETOTIC HYPERGLYCEMIC-HYPEROSMOLAR COMA (NKHHC) (MULTI): Primary | ICD-10-CM

## 2024-08-21 NOTE — PROGRESS NOTES
Pharmacist Clinic: Diabetes Management  Keesha Franklin is a 74 y.o. female was referred to Clinical Pharmacy Team for diabetes management.     Referring Provider: Gabriel Mccabe MD     HISTORY OF PRESENT ILLNESS  Approximate Date of Diagnosis: 1980    LAB REVIEW   Glucose (mg/dL)   Date Value   07/19/2024 121 (H)   05/28/2024 301 (H)   02/23/2024 200 (H)     POC HEMOGLOBIN A1c (%)   Date Value   05/28/2024 13.2 (A)   12/05/2023 6.2     Hemoglobin A1C (%)   Date Value   07/19/2024 8.2 (H)   02/23/2024 9.6 (H)   08/16/2023 7.2 (A)   12/07/2022 8.8 (A)   03/12/2021 8.3     Bicarbonate (mmol/L)   Date Value   07/19/2024 32   05/28/2024 30   02/23/2024 30     Urea Nitrogen (mg/dL)   Date Value   07/19/2024 21   05/28/2024 25 (H)   02/23/2024 22     Creatinine (mg/dL)   Date Value   07/19/2024 1.15 (H)   05/28/2024 1.20 (H)   02/23/2024 1.46 (H)     Lab Results   Component Value Date    HGBA1C 8.2 (H) 07/19/2024    HGBA1C 13.2 (A) 05/28/2024    HGBA1C 9.6 (H) 02/23/2024     Lab Results   Component Value Date    CHOL 165 02/23/2024    CHOL 145 12/07/2022    CHOL 151 03/12/2021     Lab Results   Component Value Date    HDL 62.4 02/23/2024    HDL 56.3 12/07/2022    HDL 54.1 03/12/2021     Lab Results   Component Value Date    LDLCALC 76 02/23/2024     Lab Results   Component Value Date    TRIG 135 02/23/2024    TRIG 88 12/07/2022    TRIG 99 03/12/2021       DIABETES ASSESSMENT    CURRENT PHARMACOTHERAPY  - Lantus 20-25 units nightly; no set scale will use based on what her numbers look like  - Glipizide XL 10 mg twice daily  - Ozempic 1 mg weekly; has noticed appetite reduction, had sight nausea after injection but resolves  - Jardiance 10 mg daily, 1 UTI in May, oral antibiotics and resolved    *Note patient is currently enrolled in  PAP for Eliquis*  Also reports significant copay for her other medications- Lantus, Ozempic, and Jardiance added to  VAF after last pharmacy visit    PRIMARY/SECONDARY PREVENTION  -  Statin? Yes  - ACE-I/ARB? Yes  - Aspirin? Yes  - Last eye exam: >1 year, encouraged to follow up  - Last diabetic foot exam: checks self, never any issue    SMBG MEASUREMENTS  Dexcom   -120s    Patient does not report symptoms of hypoglycemia. Patient reports dizziness and fatigue  when she does experience rarely, manages with orange juice.  Patient does report symptoms of hyperglycemia. Patient reports polyuria.    Diet:   - Breakfast: coffee and breakfast sandwich (sausage cheese)  - Lunch: healthy choice meal   - Dinner: pork chops, vegetable, baked chicken  - Snacks: goldfish crackers   - Drinks: water, no soda or juice  Patient states she has been more aware of what she is eating since she found out her A1c was elevated and she has noticed her blood sugars have come down.  Reports that before she had her A1c checked it was in 200-300s (which is consistent with A1c result)    Exercise:   - knee pain, hasn't been able to do any exercise    RECOMMENDATIONS/PLAN  Patients diabetes is poorly controlled with most recent A1c of 8.2% (goal < 7 %).   CONTINUE Ozempic 1 mg per week  Continue Lantus 20-25 units nightly  Continue Jardiance 10 mg daily  Patient did not stop taking glipizide after last pharmacy visit, reiterated that due to pill burden and likely limited benefit in comparison with other medications patient is taking for glycemic control, STOP glipizide XL 10 mg twice daily  Continue diet modifications   Continue using Dexcom to monitor blood sugars, take note of fasting blood sugars and readings approx 2 hours after a meal, document for review at follow up  Education:   Counseled patient on MOA, expectations, side effects, duration of therapy, contraindications, administration, and monitoring parameters  Answered all patient questions and concerns    Clinical Pharmacist follow up: 9/18/24 Titus Crocker PharmD  Clinical Pharmacy Specialist, Primary Care   [phone]    Continue all meds under  the continuation of care with the referring provider and clinical pharmacy team.    Verbal consent to manage patient's drug therapy was obtained from [the patient or an individual authorized to act on behalf of a patient]. They were informed they may decline to participate or withdraw from participation in pharmacy services at any time.

## 2024-08-23 PROCEDURE — RXMED WILLOW AMBULATORY MEDICATION CHARGE

## 2024-08-26 ENCOUNTER — APPOINTMENT (OUTPATIENT)
Dept: CARDIOLOGY | Facility: CLINIC | Age: 74
End: 2024-08-26
Payer: COMMERCIAL

## 2024-08-27 ENCOUNTER — APPOINTMENT (OUTPATIENT)
Dept: PHARMACY | Facility: HOSPITAL | Age: 74
End: 2024-08-27
Payer: COMMERCIAL

## 2024-08-27 ENCOUNTER — PHARMACY VISIT (OUTPATIENT)
Dept: PHARMACY | Facility: CLINIC | Age: 74
End: 2024-08-27
Payer: MEDICARE

## 2024-08-27 DIAGNOSIS — I48.91 ATRIAL FIBRILLATION, UNSPECIFIED TYPE (MULTI): Primary | ICD-10-CM

## 2024-08-27 NOTE — Clinical Note
Patient reports that she did not feel the need to take the Eliquis twice daily, and did not understand why she would need to use the medications twice a day. Discussed the importance of using Eliquis two times a day to help cover blood thinning effect for the full day. She understands and reported she will start taking twice a day. Reports she did not have any recent signs or symptoms of blood clots since the last visit.

## 2024-08-29 ENCOUNTER — APPOINTMENT (OUTPATIENT)
Dept: PRIMARY CARE | Facility: CLINIC | Age: 74
End: 2024-08-29
Payer: MEDICARE

## 2024-09-09 ENCOUNTER — APPOINTMENT (OUTPATIENT)
Dept: CARDIOLOGY | Facility: CLINIC | Age: 74
End: 2024-09-09
Payer: COMMERCIAL

## 2024-09-17 ENCOUNTER — OFFICE VISIT (OUTPATIENT)
Dept: CARDIOLOGY | Facility: CLINIC | Age: 74
End: 2024-09-17
Payer: COMMERCIAL

## 2024-09-17 VITALS
SYSTOLIC BLOOD PRESSURE: 132 MMHG | WEIGHT: 170.5 LBS | BODY MASS INDEX: 30.21 KG/M2 | HEIGHT: 63 IN | DIASTOLIC BLOOD PRESSURE: 74 MMHG | OXYGEN SATURATION: 97 %

## 2024-09-17 DIAGNOSIS — I48.0 PAROXYSMAL ATRIAL FIBRILLATION (MULTI): ICD-10-CM

## 2024-09-17 DIAGNOSIS — I42.9 CARDIOMYOPATHY, UNSPECIFIED TYPE (MULTI): ICD-10-CM

## 2024-09-17 DIAGNOSIS — I50.20 HFREF (HEART FAILURE WITH REDUCED EJECTION FRACTION): ICD-10-CM

## 2024-09-17 LAB
ATRIAL RATE: 74 BPM
P AXIS: 84 DEGREES
P OFFSET: 155 MS
P ONSET: 114 MS
PR INTERVAL: 202 MS
Q ONSET: 215 MS
QRS COUNT: 12 BEATS
QRS DURATION: 134 MS
QT INTERVAL: 408 MS
QTC CALCULATION(BAZETT): 452 MS
QTC FREDERICIA: 437 MS
R AXIS: -63 DEGREES
T AXIS: 77 DEGREES
T OFFSET: 419 MS
VENTRICULAR RATE: 74 BPM

## 2024-09-17 PROCEDURE — 93010 ELECTROCARDIOGRAM REPORT: CPT | Performed by: INTERNAL MEDICINE

## 2024-09-17 PROCEDURE — 1159F MED LIST DOCD IN RCRD: CPT | Performed by: INTERNAL MEDICINE

## 2024-09-17 PROCEDURE — 3078F DIAST BP <80 MM HG: CPT | Performed by: INTERNAL MEDICINE

## 2024-09-17 PROCEDURE — 99214 OFFICE O/P EST MOD 30 MIN: CPT | Performed by: INTERNAL MEDICINE

## 2024-09-17 PROCEDURE — 3075F SYST BP GE 130 - 139MM HG: CPT | Performed by: INTERNAL MEDICINE

## 2024-09-17 PROCEDURE — 1123F ACP DISCUSS/DSCN MKR DOCD: CPT | Performed by: INTERNAL MEDICINE

## 2024-09-17 PROCEDURE — 1126F AMNT PAIN NOTED NONE PRSNT: CPT | Performed by: INTERNAL MEDICINE

## 2024-09-17 PROCEDURE — 93005 ELECTROCARDIOGRAM TRACING: CPT | Performed by: INTERNAL MEDICINE

## 2024-09-17 PROCEDURE — 1036F TOBACCO NON-USER: CPT | Performed by: INTERNAL MEDICINE

## 2024-09-17 ASSESSMENT — PAIN SCALES - GENERAL: PAINLEVEL: 0-NO PAIN

## 2024-09-17 NOTE — PROGRESS NOTES
Referred by Juancarlos Bobby MD provider found for   Chief Complaint   Patient presents with    Atrial Fibrillation    New Patient Visit        Keesha Franklin is a 74 y.o. year old female patient with h/o  CAD s/p D2 POBA 1/19 (LAD ), s/p MI 1/19, HFrEF/CM, parox AF/FL, HTN, HLD, DM, CVA 9/17 (no resid), breast Ca s/p mast now w/ ARANDA/edema c/w decompensation. The patient was referred to me for treatment options evaluation for her A fib.     PMHx/PSHx: As above    FamHx: unremarkable     Allergies:  Allergies   Allergen Reactions    Jardiance [Empagliflozin] Swelling     Legs become swollen and painful with shooting pains    Lisinopril Angioedema and Unknown        Review of Systems    Constitutional: not feeling tired.   Eyes: no eyesight problems.   ENT: no hearing loss and no nosebleeds.   Cardiovascular: no intermittent leg claudication and as noted in HPI.   Respiratory: no chronic cough and no shortness of breath.   Gastrointestinal: no change in bowel habits and no blood in stools.   Genitourinary: no urinary frequency and no hematuria.   Skin: no skin rashes.   Neurological: no seizures and no frequent falls.   Psychiatric: no depression and not suicidal.   All other systems have been reviewed and are negative for complaint.     Outpatient Medications:  Current Outpatient Medications   Medication Instructions    ALPRAZolam (XANAX) 1 mg, oral, Daily PRN    amLODIPine (NORVASC) 5 mg, oral, Daily    aspirin 81 mg EC tablet 1 tablet, oral, Daily    atorvastatin (LIPITOR) 80 mg, oral, Nightly    blood sugar diagnostic (Accu-Chek Tiffanie Plus test strp) strip USE TO TEST THREE TIMES A DAY    blood-glucose sensor (Dexcom G7 Sensor) device 1 kit, miscellaneous, Daily    Eliquis 5 mg, oral, 2 times daily    escitalopram (LEXAPRO) 10 mg, oral, Daily    furosemide (LASIX) 40 mg, oral, Daily    gabapentin (NEURONTIN) 100 mg, oral, 3 times daily    glipiZIDE XL (Glucotrol XL) 10 mg 24 hr tablet oral, 2 times daily  "   incontinence pad, liner, disp pad 1 each, miscellaneous, 2 times daily    insulin glargine (Lantus Solostar U-100 Insulin) 100 unit/mL (3 mL) pen INJECT 18-20 UNITS SUBCUTANEOUSLY DAILY    isosorbide mononitrate ER (IMDUR) 60 mg, oral, Daily, Do not crush or chew.    Lantus Solostar U-100 Insulin 20-25 Units, subcutaneous, Nightly, Take as directed per insulin instructions.    metoprolol succinate XL (TOPROL-XL) 12.5 mg, oral, Nightly    nitroglycerin (NITROSTAT) 0.4 mg, sublingual, Every 5 min PRN, Up to 3 times.    Ozempic 1 mg, subcutaneous, Once Weekly    pen needle, diabetic (BD Ultra-Fine Sameera Pen Needle) 32 gauge x 5/32\" needle USE AS DIRECTED TO TEST THREE TIMES A DAY    potassium chloride CR (Klor-Con) 10 mEq ER tablet 10 mEq, oral, Daily, Do not crush, chew, or split.    sodium hyaluronate (Durolane) 60 mg/3 mL injection intra-articular    valsartan-hydrochlorothiazide (Diovan-HCT) 160-25 mg tablet 1 tablet, oral, Daily         Last Recorded Vitals:      2/18/2024     9:08 PM 2/18/2024     9:11 PM 2/20/2024    12:27 PM 5/28/2024    10:51 AM 7/12/2024    10:52 AM 7/19/2024    10:24 AM 9/17/2024     8:15 AM   Vitals   Systolic 134 131 131 134 137 131 132   Diastolic 67 67 81 73 81 73 74   Heart Rate 78 93  106 112 72    Temp   35.9 °C (96.6 °F) 36.3 °C (97.4 °F)  36.7 °C (98.1 °F)    Resp 16 16        Height (in)    1.689 m (5' 6.5\") 1.666 m (5' 5.58\")  1.6 m (5' 3\")   Weight (lb)   183 183 183.3 180.6 170.5   BMI   29.09 kg/m2 29.09 kg/m2 29.97 kg/m2 29.52 kg/m2 30.2 kg/m2   BSA (m2)   1.97 m2 1.97 m2 1.96 m2 1.95 m2 1.85 m2   Visit Report   Report Report Report Report Report    Visit Vitals  /74 (BP Location: Right arm, Patient Position: Sitting)   Ht 1.6 m (5' 3\")   Wt 77.3 kg (170 lb 8 oz)   SpO2 97%   BMI 30.20 kg/m²   OB Status Postmenopausal   Smoking Status Never   BSA 1.85 m²        Physical Exam:  Constitutional: alert and in no acute distress.   Eyes: no erythema, swelling or discharge " from the eye .   Neck: neck is supple, symmetric, trachea midline, no masses  and no thyromegaly .   Pulmonary: no increased work of breathing or signs of respiratory distress  and lungs clear to auscultation.    Cardiovascular: carotid pulses 2+ bilaterally with no bruit , JVP was normal, no thrills , regular rhythm, normal S1 and S2, no murmurs , pedal pulses 2+ bilaterally  and no edema .   Abdomen: abdomen non-tender, no masses  and no hepatomegaly .   Skin: skin warm and dry, normal skin turgor .   Psychiatric judgment and insight is normal  and oriented to person, place and time .        Assessment/Plan   Problem List Items Addressed This Visit             ICD-10-CM    A-fib (Multi) I48.91    Relevant Orders    ECG 12 Lead     Other Visit Diagnoses         Codes    HFrEF (heart failure with reduced ejection fraction) (Multi)     I50.20    Cardiomyopathy, unspecified type (Multi)     I42.9            Keesha Franklin is a 74 y.o. year old female patient with h/o  CAD s/p D2 POBA 1/19 (LAD ), s/p MI 1/19, HFrEF/CM, parox AF/FL, HTN, HLD, DM, CVA 9/17 (no resid), breast Ca s/p mast now w/ ARANDA/edema c/w decompensation. The patient was referred to me for treatment options evaluation for her A fib.   Her current ECG shows NSR with RBBB, HR 74 bpm. Has a recent echo with possible mass in the RA and LVEF 35-40%. Also had a nuclear stress test with no evidence of ischemia and LVEF of 47%. She is a good candidate for rhythm control and RF ablation will be a good option for her. At this point it is unclear if an ablation can be performed due to the casual finding of a mass in the RA septum by the TTE. She still has a pending cardiac MRI that will need ot be done in order to define if there is in fact a mass. Once the MRI id done I'll see her with the results to define next step in treatment.         Bala Phelps MD  Cardiac Electrophysiology      Thank you very much for allowing me to participate in the care of this  pleasant patient. Please do not hesitate to contact me with any further questions or concerns regarding his care.    **Disclaimer: This note was dictated by speech recognition, and every effort has been made to prevent any error in transcription, however minor errors may be present**

## 2024-09-18 ENCOUNTER — APPOINTMENT (OUTPATIENT)
Dept: PHARMACY | Facility: HOSPITAL | Age: 74
End: 2024-09-18
Payer: COMMERCIAL

## 2024-09-27 NOTE — PROGRESS NOTES
Chief Complaint   Patient presents with    Atrial Fibrillation    Heart Failure       HPI  73 yo BF w/ h/o CAD s/p D2 POBA 1/19 (LAD ), s/p MI 1/19, HFrEF/CM, parox AF/FL, HTN, HLD, DM, CVA 9/17 (no resid), breast Ca s/p mast now here for cardiology FU. No chest pain. No dyspnea at rest. +ARANDA (mild-mod exertion). No orthopnea/PND. No palps. +occ brief LH on standing up. No syncope. +LE edema. No claudication. No cough. +occ fatigue. +snoring. +occ RUE cramp x seconds.   ECG 6/22: SR (74), PACs, RBBB, LAFB  ECG 9/23: SR (75), PACs, RBBB, LAFB, ?LMI  ECG 1/24: AFIB (62), RBBB, LAFB, ?ALMI  ECG 4/24: SR (77), RBBB, LAFB, ?LMI  ECG 7/24: SR (81), PACs, 1st deg AVB, iRBBB, IMI  HM 11/17: SR, HR  (avg 76), SVT x29 (long 23s)  HM 1/24: SR w/ parox AF/FL 56%, HR  (avg 62), SVT x69 (long 49s), 2nd deg AVB-I  Echo 6/21: EF 55-60%, pseudonl/DD, mod LAE, mild MR  Echo 2/24: EF 35-40%, apex HK, DD, mod LAE, smam R mass, mild-mod MR  Stress cMRI 3/19: EF 39%, LAD scar/ischemia, nl RV  Nuc 4/24: no ischemia, large apex scar, EF 47%  Cath 1/19; mLAD 100%, pD2 100% (POBA)  Cath 6/21: pLAD 30%, p-mLAD 30%, mLAD 100% (L->L collat), o-pD2 99%, LVEDP 18-21, no AVS  CXR 1/24: no acute abnl  CTA chest 4/19: no PE, nl PA, mild CAC, nl heart size, no peric eff, mild AA, no aneurysm  MRI brain 9/17: acute infarct L front operculum  MRA head/neck 9/17: BICA sig loss (?artifact)     Review of Systems   Constitutional: Negative for chills, fever and malaise/fatigue.   HENT:  Negative for hearing loss.    Eyes:  Negative for visual disturbance.   Respiratory:  Negative for cough, hemoptysis and wheezing.    Skin:  Negative for rash.   Musculoskeletal:  Negative for falls and myalgias.   Gastrointestinal:  Negative for bloating, abdominal pain, constipation, diarrhea, dysphagia, nausea and vomiting.   Genitourinary:  Negative for dysuria and hematuria.   Neurological:  Negative for headaches.   Psychiatric/Behavioral:  Negative for  altered mental status, depression and memory loss.       Social History     Tobacco Use    Smoking status: Never    Smokeless tobacco: Never   Substance Use Topics    Alcohol use: Not Currently     Alcohol/week: 1.0 standard drink of alcohol     Types: 1 Standard drinks or equivalent per week      Family History   Problem Relation Name Age of Onset    Hypertension Mother      Coronary artery disease Father      Other (cardiac disorder) Father      Hypertension Sister      COPD Sister      COPD Brother        Allergies   Allergen Reactions    Jardiance [Empagliflozin] Swelling     Legs become swollen and painful with shooting pains    Lisinopril Angioedema and Unknown      Current Outpatient Medications   Medication Instructions    ALPRAZolam (XANAX) 1 mg, oral, Daily PRN    amLODIPine (NORVASC) 5 mg, oral, Daily    aspirin 81 mg EC tablet 1 tablet, oral, Daily    atorvastatin (LIPITOR) 80 mg, oral, Nightly    blood sugar diagnostic (Accu-Chek Tiffanie Plus test strp) strip USE TO TEST THREE TIMES A DAY    blood-glucose sensor (Dexcom G7 Sensor) device 1 kit, miscellaneous, Daily    Eliquis 5 mg, oral, 2 times daily    escitalopram (LEXAPRO) 10 mg, oral, Daily    furosemide (LASIX) 20 mg, oral, Daily    gabapentin (NEURONTIN) 100 mg, oral, 3 times daily    glipiZIDE XL (Glucotrol XL) 10 mg 24 hr tablet oral, 2 times daily    incontinence pad, liner, disp pad 1 each, miscellaneous, 2 times daily    insulin glargine (Lantus Solostar U-100 Insulin) 100 unit/mL (3 mL) pen INJECT 18-20 UNITS SUBCUTANEOUSLY DAILY    isosorbide mononitrate ER (IMDUR) 60 mg, oral, Daily, Do not crush or chew.    Lantus Solostar U-100 Insulin 20-25 Units, subcutaneous, Nightly, Take as directed per insulin instructions.    metoprolol succinate XL (TOPROL-XL) 12.5 mg, oral, Nightly    nitroglycerin (NITROSTAT) 0.4 mg, sublingual, Every 5 min PRN, Up to 3 times.    Ozempic 1 mg, subcutaneous, Once Weekly    pen needle, diabetic (BD Ultra-Fine Sameera  "Pen Needle) 32 gauge x \" needle USE AS DIRECTED TO TEST THREE TIMES A DAY    potassium chloride CR (Klor-Con) 10 mEq ER tablet 10 mEq, oral, Daily, Do not crush, chew, or split.    sodium hyaluronate (Durolane) 60 mg/3 mL injection intra-articular    valsartan-hydrochlorothiazide (Diovan-HCT) 160-25 mg tablet 1 tablet, oral, Daily             Vitals:    24 1052   BP: 137/81   Pulse: (!) 112   SpO2: 93%      Physical Exam  Constitutional:       Appearance: Normal appearance.   HENT:      Head: Normocephalic and atraumatic.      Nose: Nose normal.   Neck:      Vascular: JVD present. No carotid bruit.   Cardiovascular:      Rate and Rhythm: Normal rate. Rhythm irregular. Extrasystoles are present.     Heart sounds: No murmur heard.  Pulmonary:      Effort: Pulmonary effort is normal.      Breath sounds: Normal breath sounds.   Abdominal:      Palpations: Abdomen is soft.      Tenderness: There is no abdominal tenderness.   Musculoskeletal:      Right lower le+ Pitting Edema present.      Left lower le+ Pitting Edema present.   Skin:     General: Skin is warm and dry.   Neurological:      General: No focal deficit present.      Mental Status: She is alert.   Psychiatric:         Mood and Affect: Mood normal.         Judgment: Judgment normal.        Results/Data   Cr 1.2, K 3.9   Cr 1.46, K 4.4, Mg 3.0, LFT nl, LDL 76, HDL 62, , Chol 165, HGB 13.9, , hgba1c 9.6, TSH 2.6   Cr 1.34, K 3.7, Mg 1.96, TSH 3.6,    Cr 1.3, K 3.7, LFT nl, hgba1c 7.2   Cr 1.21, K 3.9, LFT nl, LDL 71, HDL 56, TG 88, Chol 145, HGB 13, , hgba1c 8.8, TSH 3.44   TPN 11     Assessment/Plan   75 yo BF w/ h/o CAD s/p D2 POBA  (LAD ), s/p MI , HFrEF/CM, parox AF/FL, HTN, HLD, DM, CVA  (no resid), breast Ca s/p mast now w/ ARANDA/edema c/w decompensation. Increase Lasix. Refer to EP for PAF/FL (?AA vs RFA). cMRI pending (for CM and ?RA mass).  -continue ASA 81 every " day  -continue Xarelto 20 every day  -continue Metoprolol Succinate 25 qd  -continue Valsartan-HCTZ 160-25 qd  -continue Amlodipine 5 qd (if BP controlled, try decrease or stop in case contributing to edema)  -continue Imdur 60 every day  -increase Lasix from 20 to 40 every day  -consider [place]  -continue Atorva 80 at bedtime  -consider retry Jardiance or Farxiga (she thinks it caused her SE)  -f/u 2 months (earlier if needed)     Juancarlos Browning MD   [facility]

## 2024-10-15 ENCOUNTER — HOSPITAL ENCOUNTER (OUTPATIENT)
Dept: RADIOLOGY | Facility: HOSPITAL | Age: 74
Discharge: HOME | End: 2024-10-15
Payer: COMMERCIAL

## 2024-10-15 DIAGNOSIS — I42.9 CARDIOMYOPATHY, UNSPECIFIED TYPE (MULTI): ICD-10-CM

## 2024-10-15 DIAGNOSIS — I51.89 RIGHT ATRIAL MASS: ICD-10-CM

## 2024-10-15 PROCEDURE — 2550000001 HC RX 255 CONTRASTS: Performed by: INTERNAL MEDICINE

## 2024-10-15 PROCEDURE — A9575 INJ GADOTERATE MEGLUMI 0.1ML: HCPCS | Performed by: INTERNAL MEDICINE

## 2024-10-15 PROCEDURE — 75561 CARDIAC MRI FOR MORPH W/DYE: CPT | Performed by: RADIOLOGY

## 2024-10-15 PROCEDURE — 75561 CARDIAC MRI FOR MORPH W/DYE: CPT

## 2024-10-15 RX ORDER — GADOTERATE MEGLUMINE 376.9 MG/ML
25 INJECTION INTRAVENOUS
Status: COMPLETED | OUTPATIENT
Start: 2024-10-15 | End: 2024-10-15

## 2024-10-16 ENCOUNTER — TELEPHONE (OUTPATIENT)
Dept: CARDIOLOGY | Facility: CLINIC | Age: 74
End: 2024-10-16

## 2024-10-16 ENCOUNTER — APPOINTMENT (OUTPATIENT)
Dept: ENDOCRINOLOGY | Facility: CLINIC | Age: 74
End: 2024-10-16
Payer: MEDICARE

## 2024-10-16 NOTE — TELEPHONE ENCOUNTER
----- Message from Juancarlos Browning sent at 10/16/2024 12:37 PM EDT -----  Notify pt MRI looked good. No mass. Heart strength just mildly reduced (45% if she asks). No infiltration/Amyloid.

## 2024-10-16 NOTE — TELEPHONE ENCOUNTER
Result Communication    Resulted Orders   MR cardiac morphology and function w and wo IV contrast    Narrative    Interpreted By:  Abdon Ruiz  and Marin Drummond   STUDY:  MR CARDIAC MORPHOLOGY AND FUNCTION W AND WO IV CONTRAST;  10/15/2024  11:50 am      INDICATION:  Signs/Symptoms:CM, RA mass seen on echo (recommended MRI).   This  study is performed to assess myocardial viability and damage, and to  quantitate left ventricular and valvular function.      COMPARISON:  Echocardiogram 02/07/2024.      ACCESSION NUMBER(S):  [id number]      ORDERING CLINICIAN:  FREDY EAST      TECHNIQUE:  Siemens1.5  Eliza MRI scanner.  Turbo spin echo and balanced steady state free precession (bSSFP)  imaging for anatomic definition. Dynamic cine bSSFP for cardiac  chamber and wall-motion analysis, and valvular analysis. Pre and  postcontrast vibe images were also obtained to evaluate for right  atrial mass after the administration of 25 mL Dotarem. Flow  quantification sequences for hemodynamics.      HT-160 cm; WT-77.1 kg; BSA-1.8 m2      FINDINGS:  Examination is severely limited due to atrial fibrillation.      CARDIAC CHAMBERS  Normal atrioventricular and ventriculoarterial concordance      LEFT ATRIUM  Mildly dilated (Area-25.3 cm2)      RIGHT ATRIUM  Normal size (Area-9.6 cm2)      INTERATRIAL SEPTUM  Lipomatous hypertrophy of the inter-atrial septum and the  atrioventricular groove.      LEFT VENTRICLE  *Normal LV size (EDVi-40 ml/m2) and decreased systolic function  (LVEF-45 %). *Globally hypokinetic left ventricle which may be seen  in the setting atrial fibrillation. *Normal LV wall thickness and  indexed LV mass.      Quantitative left ventricular functional values are as follows:  EDV = 72 cc; EDVi = 40 cc/m2  ESV = 40 cc; ESVi = 22 cc/m2  Stroke volume = 32 cc; SVi = 18 cc/m2  LVEF = 45 %  Absolute Cardiac Output = 1.8 l/min.; COi = 1.0 l/min/m2  LV mass = 103 gm; LVMi = 57 gm/m2      *Miguel VALDEZ et al.  Normalized left ventricular systolic and diastolic  function by steady state free precession cardiovascular magnetic  resonance. J Cardiovasc Magn Reson 2006; 8:417-26.      RIGHT VENTRICLE  The right ventricle appears normal in size, shape, and has normal  qualitative systolic function. No segmental wall motion  abnormalities. No abnormal delayed enhancement in the myocardium.      INTERVENTRICULAR SEPTUM  Intact.      AORTIC VALVE  There is  no aortic regurgitation.      MITRAL VALVE  There is  no mitral regurgitation.      TRICUSPID VALVE  There is qualitative no tricuspid regurgitation.      THORACIC AORTA  The thoracic aorta appears normal in course, caliber, and contour.  There is no evidence for acute aortic pathology. The arch vessel  branching pattern is  normal. All the arch branch vessels appear  widely patent in their proximal portions.      PULMONARY ARTERIES  The central pulmonary arteries appear normal.      SYSTEMIC AND PULMONARY VEINS  Normal systemic venous and pulmonary venous return.  The SVC and IVC are of normal caliber.  Normal pulmonary venous anatomy.      CHEST  The chest wall is normal.  No significant lymphadenopathy or mass is seen in limited images of  the mediastinum. Limited imaging through the lungs reveals no gross  abnormalities. No pleural effusion.      UPPER ABDOMEN  Right upper pole renal cystic lesion most consistent with a simple  cyst. Limited imaging through the upper abdomen reveals no  abnormalities of the visualized organs.        Impression    Examination is limited due to atrial fibrillation.  1. Normal LV size (EDVi-40 ml/m2) and decreased systolic function  (LVEF-45 %). Globally hypokinetic left ventricle which may be seen in  the setting atrial fibrillation.  2. Lipomatous hypertrophy of the inter-atrial septum and the  atrioventricular groove. No evidence of right atrial mass.  3. There are no findings to suggest prior ischemic damage or an  infiltrative  process.  4. Normal aortic, mitral, and tricuspid valve function.      I personally reviewed the images/study and I agree with the findings  as stated by Bhanu Funes MD. This study was interpreted at  [facility], [place], [place].      Signed by: Abdon Ruiz 10/16/2024 11:24 AM  Dictation workstation:   [id number]       2:07 PM    Phoned patient and spoke to patient.  Provided patient results per Dr. Browning notation and patient verbalized understanding.

## 2024-10-29 DIAGNOSIS — E08.00 DIABETES MELLITUS DUE TO UNDERLYING CONDITION WITH HYPEROSMOLARITY WITHOUT NONKETOTIC HYPERGLYCEMIC-HYPEROSMOLAR COMA (NKHHC): ICD-10-CM

## 2024-11-05 ENCOUNTER — APPOINTMENT (OUTPATIENT)
Dept: CARDIOLOGY | Facility: CLINIC | Age: 74
End: 2024-11-05
Payer: COMMERCIAL

## 2024-11-19 ENCOUNTER — APPOINTMENT (OUTPATIENT)
Dept: PRIMARY CARE | Facility: CLINIC | Age: 74
End: 2024-11-19
Payer: COMMERCIAL

## 2024-11-21 ENCOUNTER — APPOINTMENT (OUTPATIENT)
Dept: PRIMARY CARE | Facility: CLINIC | Age: 74
End: 2024-11-21
Payer: MEDICARE

## 2024-12-13 ENCOUNTER — LAB (OUTPATIENT)
Dept: LAB | Facility: LAB | Age: 74
End: 2024-12-13
Payer: COMMERCIAL

## 2024-12-13 ENCOUNTER — APPOINTMENT (OUTPATIENT)
Dept: PRIMARY CARE | Facility: CLINIC | Age: 74
End: 2024-12-13
Payer: COMMERCIAL

## 2024-12-13 VITALS
DIASTOLIC BLOOD PRESSURE: 88 MMHG | HEART RATE: 116 BPM | BODY MASS INDEX: 30.47 KG/M2 | SYSTOLIC BLOOD PRESSURE: 144 MMHG | WEIGHT: 172 LBS

## 2024-12-13 DIAGNOSIS — I48.91 ATRIAL FIBRILLATION, UNSPECIFIED TYPE (MULTI): ICD-10-CM

## 2024-12-13 DIAGNOSIS — I50.22 CHRONIC SYSTOLIC HEART FAILURE: ICD-10-CM

## 2024-12-13 DIAGNOSIS — N18.31 CHRONIC RENAL IMPAIRMENT, STAGE 3A (MULTI): ICD-10-CM

## 2024-12-13 DIAGNOSIS — E08.00 DIABETES MELLITUS DUE TO UNDERLYING CONDITION WITH HYPEROSMOLARITY WITHOUT NONKETOTIC HYPERGLYCEMIC-HYPEROSMOLAR COMA (NKHHC): ICD-10-CM

## 2024-12-13 DIAGNOSIS — I10 PRIMARY HYPERTENSION: ICD-10-CM

## 2024-12-13 DIAGNOSIS — I10 PRIMARY HYPERTENSION: Primary | ICD-10-CM

## 2024-12-13 DIAGNOSIS — I50.20 HFREF (HEART FAILURE WITH REDUCED EJECTION FRACTION): ICD-10-CM

## 2024-12-13 DIAGNOSIS — F32.A DEPRESSION, UNSPECIFIED DEPRESSION TYPE: ICD-10-CM

## 2024-12-13 DIAGNOSIS — E78.5 HYPERLIPIDEMIA, UNSPECIFIED HYPERLIPIDEMIA TYPE: ICD-10-CM

## 2024-12-13 LAB
ALBUMIN SERPL BCP-MCNC: 4 G/DL (ref 3.4–5)
ALP SERPL-CCNC: 75 U/L (ref 33–136)
ALT SERPL W P-5'-P-CCNC: 19 U/L (ref 7–45)
ANION GAP SERPL CALC-SCNC: 14 MMOL/L (ref 10–20)
AST SERPL W P-5'-P-CCNC: 21 U/L (ref 9–39)
BILIRUB SERPL-MCNC: 1.2 MG/DL (ref 0–1.2)
BUN SERPL-MCNC: 20 MG/DL (ref 6–23)
CALCIUM SERPL-MCNC: 9.9 MG/DL (ref 8.6–10.6)
CHLORIDE SERPL-SCNC: 103 MMOL/L (ref 98–107)
CO2 SERPL-SCNC: 29 MMOL/L (ref 21–32)
CREAT SERPL-MCNC: 1.16 MG/DL (ref 0.5–1.05)
EGFRCR SERPLBLD CKD-EPI 2021: 49 ML/MIN/1.73M*2
EST. AVERAGE GLUCOSE BLD GHB EST-MCNC: 120 MG/DL
GLUCOSE SERPL-MCNC: 179 MG/DL (ref 74–99)
HBA1C MFR BLD: 5.8 %
POTASSIUM SERPL-SCNC: 4 MMOL/L (ref 3.5–5.3)
PROT SERPL-MCNC: 7 G/DL (ref 6.4–8.2)
SODIUM SERPL-SCNC: 142 MMOL/L (ref 136–145)

## 2024-12-13 PROCEDURE — 99213 OFFICE O/P EST LOW 20 MIN: CPT | Performed by: INTERNAL MEDICINE

## 2024-12-13 PROCEDURE — G2211 COMPLEX E/M VISIT ADD ON: HCPCS | Performed by: INTERNAL MEDICINE

## 2024-12-13 PROCEDURE — 83036 HEMOGLOBIN GLYCOSYLATED A1C: CPT

## 2024-12-13 PROCEDURE — 1158F ADVNC CARE PLAN TLK DOCD: CPT | Performed by: INTERNAL MEDICINE

## 2024-12-13 PROCEDURE — 36415 COLL VENOUS BLD VENIPUNCTURE: CPT

## 2024-12-13 PROCEDURE — 1126F AMNT PAIN NOTED NONE PRSNT: CPT | Performed by: INTERNAL MEDICINE

## 2024-12-13 PROCEDURE — 1123F ACP DISCUSS/DSCN MKR DOCD: CPT | Performed by: INTERNAL MEDICINE

## 2024-12-13 PROCEDURE — 80053 COMPREHEN METABOLIC PANEL: CPT

## 2024-12-13 PROCEDURE — 3077F SYST BP >= 140 MM HG: CPT | Performed by: INTERNAL MEDICINE

## 2024-12-13 PROCEDURE — 3079F DIAST BP 80-89 MM HG: CPT | Performed by: INTERNAL MEDICINE

## 2024-12-13 RX ORDER — FUROSEMIDE 20 MG/1
40 TABLET ORAL DAILY
Qty: 180 TABLET | Refills: 1 | Status: SHIPPED | OUTPATIENT
Start: 2024-12-13 | End: 2025-12-13

## 2024-12-13 RX ORDER — METOPROLOL SUCCINATE 25 MG/1
12.5 TABLET, EXTENDED RELEASE ORAL NIGHTLY
Qty: 90 TABLET | Refills: 1 | Status: SHIPPED | OUTPATIENT
Start: 2024-12-13

## 2024-12-13 RX ORDER — ISOSORBIDE MONONITRATE 60 MG/1
60 TABLET, EXTENDED RELEASE ORAL DAILY
Qty: 90 TABLET | Refills: 1 | Status: SHIPPED | OUTPATIENT
Start: 2024-12-13

## 2024-12-13 RX ORDER — ATORVASTATIN CALCIUM 80 MG/1
80 TABLET, FILM COATED ORAL NIGHTLY
Qty: 90 TABLET | Refills: 1 | Status: SHIPPED | OUTPATIENT
Start: 2024-12-13

## 2024-12-13 RX ORDER — VALSARTAN AND HYDROCHLOROTHIAZIDE 160; 25 MG/1; MG/1
1 TABLET ORAL DAILY
Qty: 90 TABLET | Refills: 1 | Status: SHIPPED | OUTPATIENT
Start: 2024-12-13

## 2024-12-13 RX ORDER — ESCITALOPRAM OXALATE 10 MG/1
10 TABLET ORAL DAILY
Qty: 90 TABLET | Refills: 1 | Status: SHIPPED | OUTPATIENT
Start: 2024-12-13

## 2024-12-13 ASSESSMENT — ENCOUNTER SYMPTOMS
DYSURIA: 0
HEADACHES: 0
PALPITATIONS: 0
FREQUENCY: 0
NECK PAIN: 0
COUGH: 0
CONFUSION: 0
MYALGIAS: 0
SORE THROAT: 0
ARTHRALGIAS: 0
FEVER: 0
NERVOUS/ANXIOUS: 0
FATIGUE: 0
CHILLS: 0
BLOOD IN STOOL: 0
ABDOMINAL PAIN: 0
DIZZINESS: 0
SHORTNESS OF BREATH: 0
SINUS PAIN: 0
CONSTIPATION: 0
DIARRHEA: 0
BACK PAIN: 0
WEAKNESS: 0

## 2024-12-13 ASSESSMENT — PAIN SCALES - GENERAL: PAINLEVEL_OUTOF10: 0-NO PAIN

## 2024-12-13 NOTE — PATIENT INSTRUCTIONS
You are seen today for a well follow-up  Continue current medications which have been renewed-Jardiance and Ozempic for your diabetes  Continue valsartan/HCTZ, metoprolol, isosorbide for your heart and blood pressure  Blood work is ordered for today  Follow-up in 2 months

## 2024-12-13 NOTE — PROGRESS NOTES
Subjective   Patient ID: Keesha Franklin is a 74 y.o. female who presents for Follow-up.    HPI Ms. Franklin is seen today for a routine follow-up.  Her medical history is significant for hypertension, CKD, chronic systolic heart failure, atrial fibrillation, history of CVA with dysarthria, depression.  She states that she ran out of most of her medications about a month ago.  She is doing extremely well with her diet.  Her glucose readings are very good at home. Her home glucose readings are good ranging from .    Review of Systems   Constitutional:  Negative for chills, fatigue and fever.        10 lbs wt loss   HENT:  Negative for congestion, sinus pain and sore throat.    Respiratory:  Negative for cough and shortness of breath.    Cardiovascular:  Negative for chest pain, palpitations and leg swelling.   Gastrointestinal:  Negative for abdominal pain, blood in stool, constipation and diarrhea.   Genitourinary:  Negative for dysuria and frequency.   Musculoskeletal:  Negative for arthralgias, back pain, myalgias and neck pain.   Neurological:  Negative for dizziness, weakness and headaches.   Psychiatric/Behavioral:  Negative for confusion. The patient is not nervous/anxious.        Objective   /88 (BP Location: Right arm, Patient Position: Sitting)   Pulse (!) 116   Wt 78 kg (172 lb)   BMI 30.47 kg/m²     Physical Exam  Vitals reviewed.   Constitutional:       General: She is not in acute distress.     Appearance: Normal appearance.   HENT:      Head: Normocephalic and atraumatic.   Cardiovascular:      Rate and Rhythm: Normal rate and regular rhythm.      Pulses: Normal pulses.   Pulmonary:      Effort: Pulmonary effort is normal. No respiratory distress.      Breath sounds: Normal breath sounds.   Abdominal:      General: Bowel sounds are normal. There is no distension.      Tenderness: There is no abdominal tenderness.   Musculoskeletal:         General: No swelling or tenderness. Normal range of  motion.      Cervical back: Normal range of motion.   Skin:     General: Skin is warm.   Neurological:      General: No focal deficit present.      Mental Status: She is alert.      Coordination: Coordination normal.      Gait: Gait normal.   Psychiatric:         Mood and Affect: Mood normal.         Behavior: Behavior normal.         Assessment/Plan   Diagnoses and all orders for this visit:  Primary hypertension  Comments:  Slightly elevated as she ran out of medications  Stop amlodipine  Continue valsartan/HCTZ  Orders:  -     Comprehensive Metabolic Panel; Future  -     valsartan-hydrochlorothiazide (Diovan-HCT) 160-25 mg tablet; Take 1 tablet by mouth once daily.  -     isosorbide mononitrate ER (Imdur) 60 mg 24 hr tablet; Take 1 tablet (60 mg) by mouth once daily. Do not crush or chew.  -     escitalopram (Lexapro) 10 mg tablet; Take 1 tablet (10 mg) by mouth once daily.  Chronic systolic heart failure  Comments:  Chronic systolic heart failure  Stable  Continue metoprolol, Jardiance, furosemide and isosorbide  Chronic renal impairment, stage 3a (Multi)  Comments:  Check renal function-blood work ordered  Depression, unspecified depression type  Comments:  Chronic depression-stable  Continue escitalopram  Orders:  -     valsartan-hydrochlorothiazide (Diovan-HCT) 160-25 mg tablet; Take 1 tablet by mouth once daily.  Atrial fibrillation, unspecified type (Multi)  Comments:  Continue metoprolol and Eliquis  Orders:  -     metoprolol succinate XL (Toprol-XL) 25 mg 24 hr tablet; Take 0.5 tablets (12.5 mg) by mouth once daily at bedtime.  -     apixaban (Eliquis) 5 mg tablet; Take 1 tablet (5 mg) by mouth 2 times a day.  Primary hypertension  -     Comprehensive Metabolic Panel; Future  -     valsartan-hydrochlorothiazide (Diovan-HCT) 160-25 mg tablet; Take 1 tablet by mouth once daily.  -     isosorbide mononitrate ER (Imdur) 60 mg 24 hr tablet; Take 1 tablet (60 mg) by mouth once daily. Do not crush or chew.  -      escitalopram (Lexapro) 10 mg tablet; Take 1 tablet (10 mg) by mouth once daily.  Hyperlipidemia, unspecified hyperlipidemia type  -     atorvastatin (Lipitor) 80 mg tablet; Take 1 tablet (80 mg) by mouth once daily at bedtime.  HFrEF (heart failure with reduced ejection fraction)  Comments:  Continue isosorbide, metoprolol, furosemide, valsartan/HCTZ  Orders:  -     furosemide (Lasix) 20 mg tablet; Take 2 tablets (40 mg) by mouth once daily.  Diabetes mellitus due to underlying condition with hyperosmolarity without nonketotic hyperglycemic-hyperosmolar coma ([facility])  Comments:  Ran out of Ozempic -medication renewed-start 0.5 mg weekly  Continue Jardiance  Check hemoglobin A1c  Orders:  -     Hemoglobin A1c; Future  -     semaglutide (OZEMPIC) 1 mg/dose (4 mg/3 mL) pen injector; Inject 1 mg under the skin 1 (one) time per week.  -     semaglutide 0.25 mg or 0.5 mg (2 mg/3 mL) pen injector; Inject 0.5 mg under the skin 1 (one) time per week.    Blood work ordered for today  Follow-up in 2 months

## 2025-01-20 ENCOUNTER — HOSPITAL ENCOUNTER (INPATIENT)
Facility: HOSPITAL | Age: 75
LOS: 2 days | Discharge: HOME | End: 2025-01-22
Attending: EMERGENCY MEDICINE | Admitting: INTERNAL MEDICINE
Payer: MEDICARE

## 2025-01-20 ENCOUNTER — APPOINTMENT (OUTPATIENT)
Dept: RADIOLOGY | Facility: HOSPITAL | Age: 75
End: 2025-01-20
Payer: MEDICARE

## 2025-01-20 ENCOUNTER — APPOINTMENT (OUTPATIENT)
Dept: CARDIOLOGY | Facility: HOSPITAL | Age: 75
End: 2025-01-20
Payer: MEDICARE

## 2025-01-20 DIAGNOSIS — J18.9 PNEUMONIA OF BOTH LOWER LOBES DUE TO INFECTIOUS ORGANISM: Primary | ICD-10-CM

## 2025-01-20 DIAGNOSIS — R06.02 SHORTNESS OF BREATH: ICD-10-CM

## 2025-01-20 DIAGNOSIS — U07.1 COVID: ICD-10-CM

## 2025-01-20 DIAGNOSIS — I50.9 ACUTE ON CHRONIC CONGESTIVE HEART FAILURE, UNSPECIFIED HEART FAILURE TYPE: ICD-10-CM

## 2025-01-20 DIAGNOSIS — U07.1 COVID-19: ICD-10-CM

## 2025-01-20 PROBLEM — R79.89 ELEVATED TROPONIN: Status: ACTIVE | Noted: 2025-01-20

## 2025-01-20 PROBLEM — J90 PLEURAL EFFUSION, BILATERAL: Status: ACTIVE | Noted: 2025-01-20

## 2025-01-20 LAB
ALBUMIN SERPL BCP-MCNC: 3.7 G/DL (ref 3.4–5)
ALP SERPL-CCNC: 89 U/L (ref 33–136)
ALT SERPL W P-5'-P-CCNC: 53 U/L (ref 7–45)
ANION GAP SERPL CALC-SCNC: 12 MMOL/L (ref 10–20)
AST SERPL W P-5'-P-CCNC: 45 U/L (ref 9–39)
ATRIAL RATE: 119 BPM
BASOPHILS # BLD AUTO: 0.01 X10*3/UL (ref 0–0.1)
BASOPHILS NFR BLD AUTO: 0.3 %
BILIRUB SERPL-MCNC: 0.9 MG/DL (ref 0–1.2)
BNP SERPL-MCNC: 2774 PG/ML (ref 0–99)
BUN SERPL-MCNC: 22 MG/DL (ref 6–23)
CALCIUM SERPL-MCNC: 9 MG/DL (ref 8.6–10.3)
CARDIAC TROPONIN I PNL SERPL HS: 29 NG/L (ref 0–13)
CARDIAC TROPONIN I PNL SERPL HS: 36 NG/L (ref 0–13)
CARDIAC TROPONIN I PNL SERPL HS: 46 NG/L (ref 0–13)
CHLORIDE SERPL-SCNC: 107 MMOL/L (ref 98–107)
CO2 SERPL-SCNC: 26 MMOL/L (ref 21–32)
CREAT SERPL-MCNC: 1.16 MG/DL (ref 0.5–1.05)
EGFRCR SERPLBLD CKD-EPI 2021: 49 ML/MIN/1.73M*2
EOSINOPHIL # BLD AUTO: 0 X10*3/UL (ref 0–0.4)
EOSINOPHIL NFR BLD AUTO: 0 %
ERYTHROCYTE [DISTWIDTH] IN BLOOD BY AUTOMATED COUNT: 15.6 % (ref 11.5–14.5)
ERYTHROCYTE [DISTWIDTH] IN BLOOD BY AUTOMATED COUNT: 15.7 % (ref 11.5–14.5)
FLUAV RNA RESP QL NAA+PROBE: NOT DETECTED
FLUBV RNA RESP QL NAA+PROBE: NOT DETECTED
GLUCOSE BLD MANUAL STRIP-MCNC: 167 MG/DL (ref 74–99)
GLUCOSE SERPL-MCNC: 239 MG/DL (ref 74–99)
HCT VFR BLD AUTO: 35.3 % (ref 36–46)
HCT VFR BLD AUTO: 36.8 % (ref 36–46)
HGB BLD-MCNC: 11.6 G/DL (ref 12–16)
HGB BLD-MCNC: 11.6 G/DL (ref 12–16)
IMM GRANULOCYTES # BLD AUTO: 0.03 X10*3/UL (ref 0–0.5)
IMM GRANULOCYTES NFR BLD AUTO: 0.8 % (ref 0–0.9)
LYMPHOCYTES # BLD AUTO: 0.82 X10*3/UL (ref 0.8–3)
LYMPHOCYTES NFR BLD AUTO: 20.7 %
MCH RBC QN AUTO: 27.7 PG (ref 26–34)
MCH RBC QN AUTO: 28.9 PG (ref 26–34)
MCHC RBC AUTO-ENTMCNC: 31.5 G/DL (ref 32–36)
MCHC RBC AUTO-ENTMCNC: 32.9 G/DL (ref 32–36)
MCV RBC AUTO: 88 FL (ref 80–100)
MCV RBC AUTO: 88 FL (ref 80–100)
MONOCYTES # BLD AUTO: 0.25 X10*3/UL (ref 0.05–0.8)
MONOCYTES NFR BLD AUTO: 6.3 %
NEUTROPHILS # BLD AUTO: 2.86 X10*3/UL (ref 1.6–5.5)
NEUTROPHILS NFR BLD AUTO: 71.9 %
NRBC BLD-RTO: 0 /100 WBCS (ref 0–0)
NRBC BLD-RTO: 0 /100 WBCS (ref 0–0)
PLATELET # BLD AUTO: 289 X10*3/UL (ref 150–450)
PLATELET # BLD AUTO: 298 X10*3/UL (ref 150–450)
POTASSIUM SERPL-SCNC: 4.1 MMOL/L (ref 3.5–5.3)
PR INTERVAL: 208 MS
PROT SERPL-MCNC: 6.8 G/DL (ref 6.4–8.2)
Q ONSET: 209 MS
QRS COUNT: 20 BEATS
QRS DURATION: 106 MS
QT INTERVAL: 328 MS
QTC CALCULATION(BAZETT): 461 MS
QTC FREDERICIA: 412 MS
R AXIS: -69 DEGREES
RBC # BLD AUTO: 4.02 X10*6/UL (ref 4–5.2)
RBC # BLD AUTO: 4.19 X10*6/UL (ref 4–5.2)
RSV RNA RESP QL NAA+PROBE: NOT DETECTED
SARS-COV-2 RNA RESP QL NAA+PROBE: DETECTED
SODIUM SERPL-SCNC: 141 MMOL/L (ref 136–145)
T AXIS: 95 DEGREES
T OFFSET: 373 MS
VENTRICULAR RATE: 119 BPM
WBC # BLD AUTO: 4 X10*3/UL (ref 4.4–11.3)
WBC # BLD AUTO: 5.4 X10*3/UL (ref 4.4–11.3)

## 2025-01-20 PROCEDURE — 2500000001 HC RX 250 WO HCPCS SELF ADMINISTERED DRUGS (ALT 637 FOR MEDICARE OP): Performed by: PHYSICIAN ASSISTANT

## 2025-01-20 PROCEDURE — 85027 COMPLETE CBC AUTOMATED: CPT | Performed by: PHYSICIAN ASSISTANT

## 2025-01-20 PROCEDURE — 84484 ASSAY OF TROPONIN QUANT: CPT | Performed by: PHYSICIAN ASSISTANT

## 2025-01-20 PROCEDURE — 36415 COLL VENOUS BLD VENIPUNCTURE: CPT

## 2025-01-20 PROCEDURE — 87899 AGENT NOS ASSAY W/OPTIC: CPT | Mod: AHULAB | Performed by: PHYSICIAN ASSISTANT

## 2025-01-20 PROCEDURE — 71046 X-RAY EXAM CHEST 2 VIEWS: CPT

## 2025-01-20 PROCEDURE — 71046 X-RAY EXAM CHEST 2 VIEWS: CPT | Performed by: RADIOLOGY

## 2025-01-20 PROCEDURE — 99222 1ST HOSP IP/OBS MODERATE 55: CPT

## 2025-01-20 PROCEDURE — 2500000004 HC RX 250 GENERAL PHARMACY W/ HCPCS (ALT 636 FOR OP/ED): Performed by: PHYSICIAN ASSISTANT

## 2025-01-20 PROCEDURE — 2500000002 HC RX 250 W HCPCS SELF ADMINISTERED DRUGS (ALT 637 FOR MEDICARE OP, ALT 636 FOR OP/ED): Performed by: PHYSICIAN ASSISTANT

## 2025-01-20 PROCEDURE — 87637 SARSCOV2&INF A&B&RSV AMP PRB: CPT

## 2025-01-20 PROCEDURE — 2500000001 HC RX 250 WO HCPCS SELF ADMINISTERED DRUGS (ALT 637 FOR MEDICARE OP)

## 2025-01-20 PROCEDURE — 1100000001 HC PRIVATE ROOM DAILY

## 2025-01-20 PROCEDURE — 80053 COMPREHEN METABOLIC PANEL: CPT

## 2025-01-20 PROCEDURE — 83880 ASSAY OF NATRIURETIC PEPTIDE: CPT

## 2025-01-20 PROCEDURE — 93005 ELECTROCARDIOGRAM TRACING: CPT

## 2025-01-20 PROCEDURE — 99285 EMERGENCY DEPT VISIT HI MDM: CPT | Mod: 25 | Performed by: EMERGENCY MEDICINE

## 2025-01-20 PROCEDURE — 96374 THER/PROPH/DIAG INJ IV PUSH: CPT

## 2025-01-20 PROCEDURE — 71250 CT THORAX DX C-: CPT

## 2025-01-20 PROCEDURE — 2500000004 HC RX 250 GENERAL PHARMACY W/ HCPCS (ALT 636 FOR OP/ED)

## 2025-01-20 PROCEDURE — 82947 ASSAY GLUCOSE BLOOD QUANT: CPT

## 2025-01-20 PROCEDURE — 87449 NOS EACH ORGANISM AG IA: CPT | Mod: AHULAB | Performed by: PHYSICIAN ASSISTANT

## 2025-01-20 PROCEDURE — 84484 ASSAY OF TROPONIN QUANT: CPT

## 2025-01-20 PROCEDURE — 85025 COMPLETE CBC W/AUTO DIFF WBC: CPT

## 2025-01-20 PROCEDURE — 99223 1ST HOSP IP/OBS HIGH 75: CPT | Performed by: PHYSICIAN ASSISTANT

## 2025-01-20 PROCEDURE — 71250 CT THORAX DX C-: CPT | Performed by: RADIOLOGY

## 2025-01-20 RX ORDER — METOPROLOL SUCCINATE 25 MG/1
12.5 TABLET, EXTENDED RELEASE ORAL NIGHTLY
Status: DISCONTINUED | OUTPATIENT
Start: 2025-01-20 | End: 2025-01-20

## 2025-01-20 RX ORDER — ATORVASTATIN CALCIUM 80 MG/1
80 TABLET, FILM COATED ORAL NIGHTLY
Status: DISCONTINUED | OUTPATIENT
Start: 2025-01-20 | End: 2025-01-22 | Stop reason: HOSPADM

## 2025-01-20 RX ORDER — GABAPENTIN 100 MG/1
100 CAPSULE ORAL DAILY
Status: DISCONTINUED | OUTPATIENT
Start: 2025-01-20 | End: 2025-01-20

## 2025-01-20 RX ORDER — FUROSEMIDE 10 MG/ML
40 INJECTION INTRAMUSCULAR; INTRAVENOUS ONCE
Status: COMPLETED | OUTPATIENT
Start: 2025-01-20 | End: 2025-01-20

## 2025-01-20 RX ORDER — ESCITALOPRAM OXALATE 10 MG/1
10 TABLET ORAL DAILY
Status: DISCONTINUED | OUTPATIENT
Start: 2025-01-20 | End: 2025-01-22 | Stop reason: HOSPADM

## 2025-01-20 RX ORDER — POTASSIUM CHLORIDE 20 MEQ/1
10 TABLET, EXTENDED RELEASE ORAL DAILY
Status: DISCONTINUED | OUTPATIENT
Start: 2025-01-20 | End: 2025-01-20

## 2025-01-20 RX ORDER — INSULIN LISPRO 100 [IU]/ML
0-5 INJECTION, SOLUTION INTRAVENOUS; SUBCUTANEOUS
Status: DISCONTINUED | OUTPATIENT
Start: 2025-01-20 | End: 2025-01-22 | Stop reason: HOSPADM

## 2025-01-20 RX ORDER — NITROGLYCERIN 0.4 MG/1
0.4 TABLET SUBLINGUAL EVERY 5 MIN PRN
Status: DISCONTINUED | OUTPATIENT
Start: 2025-01-20 | End: 2025-01-22 | Stop reason: HOSPADM

## 2025-01-20 RX ORDER — GLIPIZIDE 10 MG/1
10 TABLET, FILM COATED, EXTENDED RELEASE ORAL
Status: DISCONTINUED | OUTPATIENT
Start: 2025-01-21 | End: 2025-01-20

## 2025-01-20 RX ORDER — ONDANSETRON HYDROCHLORIDE 2 MG/ML
4 INJECTION, SOLUTION INTRAVENOUS EVERY 8 HOURS PRN
Status: DISCONTINUED | OUTPATIENT
Start: 2025-01-20 | End: 2025-01-22 | Stop reason: HOSPADM

## 2025-01-20 RX ORDER — ASPIRIN 81 MG/1
81 TABLET ORAL DAILY
Status: DISCONTINUED | OUTPATIENT
Start: 2025-01-20 | End: 2025-01-22 | Stop reason: HOSPADM

## 2025-01-20 RX ORDER — FUROSEMIDE 10 MG/ML
40 INJECTION INTRAMUSCULAR; INTRAVENOUS DAILY
Status: DISCONTINUED | OUTPATIENT
Start: 2025-01-21 | End: 2025-01-21

## 2025-01-20 RX ORDER — ACETAMINOPHEN 325 MG/1
650 TABLET ORAL EVERY 4 HOURS PRN
Status: DISCONTINUED | OUTPATIENT
Start: 2025-01-20 | End: 2025-01-22 | Stop reason: HOSPADM

## 2025-01-20 RX ORDER — ISOSORBIDE MONONITRATE 30 MG/1
60 TABLET, EXTENDED RELEASE ORAL DAILY
Status: DISCONTINUED | OUTPATIENT
Start: 2025-01-20 | End: 2025-01-22 | Stop reason: HOSPADM

## 2025-01-20 RX ORDER — DEXTROSE 50 % IN WATER (D50W) INTRAVENOUS SYRINGE
25
Status: DISCONTINUED | OUTPATIENT
Start: 2025-01-20 | End: 2025-01-22 | Stop reason: HOSPADM

## 2025-01-20 RX ORDER — SENNOSIDES 8.6 MG/1
2 TABLET ORAL NIGHTLY PRN
Status: DISCONTINUED | OUTPATIENT
Start: 2025-01-20 | End: 2025-01-22 | Stop reason: HOSPADM

## 2025-01-20 RX ORDER — ACETAMINOPHEN 650 MG/1
650 SUPPOSITORY RECTAL EVERY 4 HOURS PRN
Status: DISCONTINUED | OUTPATIENT
Start: 2025-01-20 | End: 2025-01-22 | Stop reason: HOSPADM

## 2025-01-20 RX ORDER — METOPROLOL SUCCINATE 25 MG/1
25 TABLET, EXTENDED RELEASE ORAL NIGHTLY
Status: DISCONTINUED | OUTPATIENT
Start: 2025-01-20 | End: 2025-01-21

## 2025-01-20 RX ORDER — ONDANSETRON 4 MG/1
4 TABLET, ORALLY DISINTEGRATING ORAL EVERY 8 HOURS PRN
Status: DISCONTINUED | OUTPATIENT
Start: 2025-01-20 | End: 2025-01-22 | Stop reason: HOSPADM

## 2025-01-20 RX ORDER — ACETAMINOPHEN 160 MG/5ML
650 SOLUTION ORAL EVERY 4 HOURS PRN
Status: DISCONTINUED | OUTPATIENT
Start: 2025-01-20 | End: 2025-01-22 | Stop reason: HOSPADM

## 2025-01-20 RX ORDER — ALPRAZOLAM 1 MG/1
1 TABLET ORAL DAILY PRN
Status: DISCONTINUED | OUTPATIENT
Start: 2025-01-20 | End: 2025-01-20

## 2025-01-20 RX ORDER — DEXTROSE 50 % IN WATER (D50W) INTRAVENOUS SYRINGE
12.5
Status: DISCONTINUED | OUTPATIENT
Start: 2025-01-20 | End: 2025-01-22 | Stop reason: HOSPADM

## 2025-01-20 RX ADMIN — ESCITALOPRAM OXALATE 10 MG: 10 TABLET ORAL at 18:29

## 2025-01-20 RX ADMIN — APIXABAN 5 MG: 5 TABLET, FILM COATED ORAL at 23:12

## 2025-01-20 RX ADMIN — FUROSEMIDE 40 MG: 10 INJECTION, SOLUTION INTRAMUSCULAR; INTRAVENOUS at 11:32

## 2025-01-20 RX ADMIN — AZITHROMYCIN MONOHYDRATE 500 MG: 500 INJECTION, POWDER, LYOPHILIZED, FOR SOLUTION INTRAVENOUS at 23:11

## 2025-01-20 RX ADMIN — ASPIRIN 81 MG: 81 TABLET, COATED ORAL at 18:29

## 2025-01-20 RX ADMIN — ATORVASTATIN CALCIUM 80 MG: 80 TABLET, FILM COATED ORAL at 23:12

## 2025-01-20 RX ADMIN — INSULIN LISPRO 1 UNITS: 100 INJECTION, SOLUTION INTRAVENOUS; SUBCUTANEOUS at 18:30

## 2025-01-20 RX ADMIN — METOPROLOL SUCCINATE 25 MG: 25 TABLET, EXTENDED RELEASE ORAL at 23:12

## 2025-01-20 RX ADMIN — APIXABAN 5 MG: 5 TABLET, FILM COATED ORAL at 18:29

## 2025-01-20 RX ADMIN — ISOSORBIDE MONONITRATE 60 MG: 30 TABLET, EXTENDED RELEASE ORAL at 18:28

## 2025-01-20 RX ADMIN — VALSARTAN: 160 TABLET, FILM COATED ORAL at 18:29

## 2025-01-20 SDOH — SOCIAL STABILITY: SOCIAL INSECURITY: WITHIN THE LAST YEAR, HAVE YOU BEEN HUMILIATED OR EMOTIONALLY ABUSED IN OTHER WAYS BY YOUR PARTNER OR EX-PARTNER?: NO

## 2025-01-20 SDOH — SOCIAL STABILITY: SOCIAL INSECURITY: HAVE YOU HAD THOUGHTS OF HARMING ANYONE ELSE?: NO

## 2025-01-20 SDOH — SOCIAL STABILITY: SOCIAL INSECURITY
WITHIN THE LAST YEAR, HAVE YOU BEEN RAPED OR FORCED TO HAVE ANY KIND OF SEXUAL ACTIVITY BY YOUR PARTNER OR EX-PARTNER?: NO

## 2025-01-20 SDOH — ECONOMIC STABILITY: FOOD INSECURITY: HOW HARD IS IT FOR YOU TO PAY FOR THE VERY BASICS LIKE FOOD, HOUSING, MEDICAL CARE, AND HEATING?: NOT HARD AT ALL

## 2025-01-20 SDOH — SOCIAL STABILITY: SOCIAL INSECURITY: DO YOU FEEL UNSAFE GOING BACK TO THE PLACE WHERE YOU ARE LIVING?: NO

## 2025-01-20 SDOH — SOCIAL STABILITY: SOCIAL INSECURITY: WERE YOU ABLE TO COMPLETE ALL THE BEHAVIORAL HEALTH SCREENINGS?: YES

## 2025-01-20 SDOH — ECONOMIC STABILITY: HOUSING INSECURITY: IN THE PAST 12 MONTHS, HOW MANY TIMES HAVE YOU MOVED WHERE YOU WERE LIVING?: 1

## 2025-01-20 SDOH — ECONOMIC STABILITY: FOOD INSECURITY: WITHIN THE PAST 12 MONTHS, YOU WORRIED THAT YOUR FOOD WOULD RUN OUT BEFORE YOU GOT THE MONEY TO BUY MORE.: NEVER TRUE

## 2025-01-20 SDOH — ECONOMIC STABILITY: HOUSING INSECURITY: IN THE LAST 12 MONTHS, WAS THERE A TIME WHEN YOU WERE NOT ABLE TO PAY THE MORTGAGE OR RENT ON TIME?: NO

## 2025-01-20 SDOH — SOCIAL STABILITY: SOCIAL INSECURITY: DO YOU FEEL ANYONE HAS EXPLOITED OR TAKEN ADVANTAGE OF YOU FINANCIALLY OR OF YOUR PERSONAL PROPERTY?: NO

## 2025-01-20 SDOH — ECONOMIC STABILITY: INCOME INSECURITY: IN THE PAST 12 MONTHS HAS THE ELECTRIC, GAS, OIL, OR WATER COMPANY THREATENED TO SHUT OFF SERVICES IN YOUR HOME?: NO

## 2025-01-20 SDOH — ECONOMIC STABILITY: FOOD INSECURITY: WITHIN THE PAST 12 MONTHS, THE FOOD YOU BOUGHT JUST DIDN'T LAST AND YOU DIDN'T HAVE MONEY TO GET MORE.: NEVER TRUE

## 2025-01-20 SDOH — SOCIAL STABILITY: SOCIAL INSECURITY
WITHIN THE LAST YEAR, HAVE YOU BEEN KICKED, HIT, SLAPPED, OR OTHERWISE PHYSICALLY HURT BY YOUR PARTNER OR EX-PARTNER?: NO

## 2025-01-20 SDOH — ECONOMIC STABILITY: TRANSPORTATION INSECURITY: IN THE PAST 12 MONTHS, HAS LACK OF TRANSPORTATION KEPT YOU FROM MEDICAL APPOINTMENTS OR FROM GETTING MEDICATIONS?: NO

## 2025-01-20 SDOH — SOCIAL STABILITY: SOCIAL INSECURITY: HAS ANYONE EVER THREATENED TO HURT YOUR FAMILY OR YOUR PETS?: NO

## 2025-01-20 SDOH — SOCIAL STABILITY: SOCIAL INSECURITY: HAVE YOU HAD ANY THOUGHTS OF HARMING ANYONE ELSE?: NO

## 2025-01-20 SDOH — SOCIAL STABILITY: SOCIAL INSECURITY: DOES ANYONE TRY TO KEEP YOU FROM HAVING/CONTACTING OTHER FRIENDS OR DOING THINGS OUTSIDE YOUR HOME?: NO

## 2025-01-20 SDOH — SOCIAL STABILITY: SOCIAL INSECURITY: WITHIN THE LAST YEAR, HAVE YOU BEEN AFRAID OF YOUR PARTNER OR EX-PARTNER?: NO

## 2025-01-20 SDOH — SOCIAL STABILITY: SOCIAL INSECURITY: ABUSE: ADULT

## 2025-01-20 SDOH — ECONOMIC STABILITY: HOUSING INSECURITY: AT ANY TIME IN THE PAST 12 MONTHS, WERE YOU HOMELESS OR LIVING IN A SHELTER (INCLUDING NOW)?: NO

## 2025-01-20 SDOH — SOCIAL STABILITY: SOCIAL INSECURITY: ARE THERE ANY APPARENT SIGNS OF INJURIES/BEHAVIORS THAT COULD BE RELATED TO ABUSE/NEGLECT?: NO

## 2025-01-20 SDOH — SOCIAL STABILITY: SOCIAL INSECURITY: ARE YOU OR HAVE YOU BEEN THREATENED OR ABUSED PHYSICALLY, EMOTIONALLY, OR SEXUALLY BY ANYONE?: NO

## 2025-01-20 ASSESSMENT — LIFESTYLE VARIABLES
HOW MANY STANDARD DRINKS CONTAINING ALCOHOL DO YOU HAVE ON A TYPICAL DAY: PATIENT DOES NOT DRINK
AUDIT-C TOTAL SCORE: 0
AUDIT-C TOTAL SCORE: 0
HOW OFTEN DO YOU HAVE 6 OR MORE DRINKS ON ONE OCCASION: NEVER
HOW OFTEN DO YOU HAVE A DRINK CONTAINING ALCOHOL: NEVER
SKIP TO QUESTIONS 9-10: 1

## 2025-01-20 ASSESSMENT — ACTIVITIES OF DAILY LIVING (ADL)
TOILETING: INDEPENDENT
LACK_OF_TRANSPORTATION: NO
PATIENT'S MEMORY ADEQUATE TO SAFELY COMPLETE DAILY ACTIVITIES?: YES
ADEQUATE_TO_COMPLETE_ADL: YES
GROOMING: INDEPENDENT
WALKS IN HOME: INDEPENDENT
HEARING - RIGHT EAR: FUNCTIONAL
BATHING: INDEPENDENT
JUDGMENT_ADEQUATE_SAFELY_COMPLETE_DAILY_ACTIVITIES: YES
LACK_OF_TRANSPORTATION: NO
DRESSING YOURSELF: INDEPENDENT
HEARING - LEFT EAR: FUNCTIONAL
FEEDING YOURSELF: INDEPENDENT

## 2025-01-20 ASSESSMENT — COGNITIVE AND FUNCTIONAL STATUS - GENERAL
MOBILITY SCORE: 24
PATIENT BASELINE BEDBOUND: NO
DAILY ACTIVITIY SCORE: 24
DAILY ACTIVITIY SCORE: 24
MOBILITY SCORE: 24

## 2025-01-20 ASSESSMENT — PATIENT HEALTH QUESTIONNAIRE - PHQ9
1. LITTLE INTEREST OR PLEASURE IN DOING THINGS: NOT AT ALL
2. FEELING DOWN, DEPRESSED OR HOPELESS: NOT AT ALL
SUM OF ALL RESPONSES TO PHQ9 QUESTIONS 1 & 2: 0

## 2025-01-20 ASSESSMENT — COLUMBIA-SUICIDE SEVERITY RATING SCALE - C-SSRS
6. HAVE YOU EVER DONE ANYTHING, STARTED TO DO ANYTHING, OR PREPARED TO DO ANYTHING TO END YOUR LIFE?: NO
1. IN THE PAST MONTH, HAVE YOU WISHED YOU WERE DEAD OR WISHED YOU COULD GO TO SLEEP AND NOT WAKE UP?: NO
2. HAVE YOU ACTUALLY HAD ANY THOUGHTS OF KILLING YOURSELF?: NO

## 2025-01-20 ASSESSMENT — PAIN - FUNCTIONAL ASSESSMENT: PAIN_FUNCTIONAL_ASSESSMENT: 0-10

## 2025-01-20 ASSESSMENT — PAIN SCALES - GENERAL: PAINLEVEL_OUTOF10: 0 - NO PAIN

## 2025-01-20 NOTE — PROGRESS NOTES
01/20/25 1322   [facility] Disability Status   Are you deaf or do you have serious difficulty hearing? N   Are you blind or do you have serious difficulty seeing, even when wearing glasses? N   Because of a physical, mental, or emotional condition, do you have serious difficulty concentrating, remembering, or making decisions? (5 years old or older) N  (hx of CVA)   Do you have serious difficulty walking or climbing stairs? N   Do you have serious difficulty dressing or bathing? N   Because of a physical, mental, or emotional condition, do you have serious difficulty doing errands alone such as visiting the doctor? N

## 2025-01-20 NOTE — CONSULTS
"Inpatient consult to Cardiology  Consult performed by: Gloria Montaño, APRN-CNP  Consult ordered by: Karen Fonseca PA-C  Reason for consult: \"SOB, CHF exacerbation, elevated troponins, COVID +, BNP 2774 with orthopnea and increased SOB on exertion\"        Cards: Nam 1/2024  EP : Thal   History Of Present Illness:    Keesha Franklin is a 74 y.o. year old female patient with h/o  CAD s/p D2 POBA 1/19 (LAD ), s/p MI 1/19, HFrEF/CM, parox AF/FL, HTN, HLD, DM, CVA 9/17 (no resid), breast Ca s/p mast presents to the ER with shortness of breath.  Cardiology is now consulted for \"SOB, CHF exacerbation, elevated troponins, COVID +, BNP 2774 with orthopnea and increased SOB on exertion\"    Patient claims over the last couple weeks she has had increased dyspnea on exertion and shortness of breath. Hard to \"take a deep breath\"... She denies any extra lower extremity swelling, no recent falls. No fever or chills. Denies n/v/d. No change in bowel or bladder habits. Endorses taking all home medications.      Heart rate 115, blood pressure 136/86, 98% on room air.  Notable labs on admission BUNs/CR 22/1.16, BNP 2774, high sensitive troponins 29/36, H&H 11.6/35.3, COVID-19 positive, BNP 2774, chest xray shows bibasilar airspace opacities.  In the ER patient was given her home medicines, IV Lasix 40 mg and remdesivir.      Home cardiology meds include amlodipine 5 mg daily, aspirin 81 g daily, atorvastatin 80 mg daily, Jardiance 10 mg daily, Lasix 20 mg daily, Imdur 60 mg daily, metoprolol succinate 12.5 mg daily, Xarelto 20 mg daily, valsartan HCTZ 160-25 mg daily.    Admit EKG ST with PVCS        Admit Chest xray 1/20/2025      Past Cardiology Tests (Last 3 Years):  Cardiac MRI 10/2024  examination is limited due to atrial fibrillation.  1. Normal LV size (EDVi-40 ml/m2) and decreased systolic function  (LVEF-45 %). Globally hypokinetic left ventricle which may be seen in  the setting atrial fibrillation.  2. Lipomatous " hypertrophy of the inter-atrial septum and the  atrioventricular groove. No evidence of right atrial mass.  3. There are no findings to suggest prior ischemic damage or an  infiltrative process.  4. Normal aortic, mitral, and tricuspid valve function.  Nuclear Stress Test 4/2024  1. No evidence of inducible myocardial ischemia. Predominantly fixed  large-sized perfusion defects involving apex, apical to mid anterior  wall, apical to mid septal wall as well as apical lateral wall,  likely represents prior infarction. Decreased perfusion in the  inferior wall with preserved wall motion and thickness, likely  artifacts.  2. The left ventricle is normal in size.  3. Gated images demonstrate mild global hypokinesis with akinesis in  the apex with a post-stress LV EF estimated at 47%.      TTE 2/2024   1. Left ventricular systolic function is moderately decreased with a 35-40% estimated ejection fraction.   2. Basal and mid inferior wall, apical anterior segment, and apex are abnormal.   3. Spectral Doppler shows an abnormal pattern of left ventricular diastolic filling.   4. The left atrium is moderately dilated.   5. Probable small right atrial mass, on the interventricular septum. Consider ALEKSANDRA or cardiac MRI for additional characterization.   6. Mild to moderate mitral valve regurgitation.   7. Slightly elevated RVSP.   8. Compared with the prior study dated 6/21/2021, ejection fraction is reported lower in the current study. However contrast was not used in the prior study; direct comparison suggests the anterior wall is similar to prior, while the inferior wall appears more hypokinetic. The probable right atrial mass was not clearly seen in the prior study.    Cath 6/21: pLAD 30%, p-mLAD 30%, mLAD 100% (L->L collat), o-pD2 99%, LVEDP 18-21, no AS     Past Medical History:  She has a past medical history of Adjustment disorder with depressed mood (11/11/2014), Body mass index (BMI) 35.0-35.9, adult (08/30/2021),  Cardiac murmur, unspecified (06/18/2021), Cerebral infarction, unspecified (Multi) (10/25/2022), Chronic kidney disease, stage 3a (Multi) (04/19/2022), Encounter for general adult medical examination without abnormal findings (01/28/2021), Encounter for screening for malignant neoplasm of colon (11/07/2019), Encounter for screening for malignant neoplasm of colon (01/28/2021), Essential (primary) hypertension (10/25/2022), Mammographic microcalcification found on diagnostic imaging of breast (09/09/2014), Menopausal and female climacteric states (11/07/2019), Obesity, unspecified (06/30/2022), Pain in left foot (08/01/2022), Pain in left hip (12/20/2016), Personal history of other endocrine, nutritional and metabolic disease (10/25/2022), Polyneuropathy, unspecified (08/01/2022), Primary osteoarthritis, unspecified ankle and foot (08/01/2022), and Type 2 diabetes mellitus with diabetic chronic kidney disease (Multi) (08/30/2021).    Past Surgical History:  She has a past surgical history that includes Exploratory laparotomy (06/03/2014); Breast lumpectomy (06/03/2014); Mastectomy (06/30/2022); MR angio head wo IV contrast (9/5/2017); and MR angio neck wo IV contrast (9/5/2017).      Social History:  She reports that she has never smoked. She has never been exposed to tobacco smoke. She has never used smokeless tobacco. She reports that she does not currently use alcohol after a past usage of about 1.0 standard drink of alcohol per week. She reports that she does not use drugs.    Family History:  Family History   Problem Relation Name Age of Onset    Hypertension Mother      Coronary artery disease Father      Other (cardiac disorder) Father      Hypertension Sister      COPD Sister      COPD Brother          Allergies:  Jardiance [empagliflozin] and Lisinopril    ROS:  10 point review of systems including (Constitutional, Eyes, ENMT, Respiratory, Cardiac, Gastrointestinal, Neurological, Psychiatric, and  "Hematologic) was performed and is otherwise negative.    Objective Data:  Last Recorded Vitals:  Vitals:    25 1200 25 1300 25 1330 25 1400   BP: (!) 150/100 (!) 149/101 (!) 136/115 136/86   BP Location:       Patient Position:       Pulse: (!) 111 (!) 106 (!) 113 (!) 104   Resp:    Temp:       TempSrc:       SpO2: 96% 99% 98% 98%   Weight:       Height:         Medical Gas Therapy: None (Room air)  Weight  Av kg (172 lb)  Min: 78 kg (172 lb)  Max: 78 kg (172 lb)      LABS:  CMP:  Results from last 7 days   Lab Units 25  1009   SODIUM mmol/L 141   POTASSIUM mmol/L 4.1   CHLORIDE mmol/L 107   CO2 mmol/L 26   ANION GAP mmol/L 12   BUN mg/dL 22   CREATININE mg/dL 1.16*   EGFR mL/min/1.73m*2 49*   ALBUMIN g/dL 3.7   ALT U/L 53*   AST U/L 45*   BILIRUBIN TOTAL mg/dL 0.9     CBC:  Results from last 7 days   Lab Units 25  1009   WBC AUTO x10*3/uL 4.0*   HEMOGLOBIN g/dL 11.6*   HEMATOCRIT % 35.3*   PLATELETS AUTO x10*3/uL 289   MCV fL 88     COAG:     ABO: No results found for: \"ABO\"  HEME/ENDO:     CARDIAC:   Results from last 7 days   Lab Units 25  1153 25  1009   TROPHS ng/L 36* 29*   BNP pg/mL  --  2,774*             Last I/O:  No intake or output data in the 24 hours ending 25 1416  Net IO Since Admission: No IO data has been entered for this period [25 1416]      Imaging Results:  ECG 12 lead    Result Date: 2025  Sinus tachycardia with Premature supraventricular complexes Left axis deviation Incomplete right bundle branch block Inferior infarct , age undetermined Possible Anterior infarct (cited on or before 16-SEP-2023) Abnormal ECG When compared with ECG of 17-SEP-2024 08:22, Vent. rate has increased BY  45 BPM QRS duration has decreased Inferior infarct is now Present Questionable change in initial forces of Anterolateral leads    XR chest 2 views    Result Date: 2025  Interpreted By:  Marcus Mendes, STUDY: XR CHEST 2 VIEWS;  " 1/20/2025 10:33 am   INDICATION: Signs/Symptoms:Shortness of breath.   COMPARISON: Chest radiograph 01/29/2024   ACCESSION NUMBER(S): [id number]   ORDERING CLINICIAN: VANIA MONROY   FINDINGS:     CARDIOMEDIASTINAL SILHOUETTE: Cardiomediastinal silhouette is unremarkable in size and configuration.   LUNGS: Bibasilar airspace opacities. No pneumothorax or effusion.   ABDOMEN: No remarkable upper abdominal findings.   BONES: No acute osseous changes.       1.  Bibasilar airspace opacities.     Signed by: Marcus Mendes 1/20/2025 11:06 AM Dictation workstation:   [id number]      Inpatient Medications:  Scheduled medications   Medication Dose Route Frequency    apixaban  5 mg oral BID    aspirin  81 mg oral Daily    atorvastatin  80 mg oral Nightly    escitalopram  10 mg oral Daily    [START ON 1/21/2025] furosemide  40 mg intravenous Daily    gabapentin  100 mg oral Daily    [START ON 1/21/2025] glipiZIDE XL  10 mg oral Daily with breakfast    insulin lispro  0-5 Units subcutaneous TID AC    isosorbide mononitrate ER  60 mg oral Daily    metoprolol succinate XL  12.5 mg oral Nightly    potassium chloride CR  10 mEq oral Daily    valsartan 160 mg, hydroCHLOROthiazide 12.5 mg for Diovan HCT   oral Daily     PRN medications   Medication    acetaminophen    Or    acetaminophen    Or    acetaminophen    ALPRAZolam    dextrose    dextrose    glucagon    glucagon    nitroglycerin    ondansetron ODT    Or    ondansetron    oxygen    sennosides     Continuous Medications   Medication Dose Last Rate       Outpatient Medications:  Current Outpatient Medications   Medication Instructions    ALPRAZolam (XANAX) 1 mg, oral, Daily PRN    apixaban (ELIQUIS) 5 mg, oral, 2 times daily    aspirin 81 mg EC tablet 1 tablet, oral, Daily    atorvastatin (LIPITOR) 80 mg, oral, Nightly    blood sugar diagnostic (Accu-Chek Tiffanie Plus test strp) strip USE TO TEST THREE TIMES A DAY    blood-glucose sensor (Dexcom G7 Sensor) device 1 kit,  "miscellaneous, Daily    escitalopram (LEXAPRO) 10 mg, oral, Daily    furosemide (Lasix) 20 mg tablet TAKE 2 TABLETS(40 MG) BY MOUTH DAILY    gabapentin (NEURONTIN) 100 mg, oral, 3 times daily    glipiZIDE XL (Glucotrol XL) 10 mg 24 hr tablet oral, 2 times daily    incontinence pad, liner, disp pad 1 each, miscellaneous, 2 times daily    insulin glargine (Lantus Solostar U-100 Insulin) 100 unit/mL (3 mL) pen INJECT 18-20 UNITS SUBCUTANEOUSLY DAILY    isosorbide mononitrate ER (IMDUR) 60 mg, oral, Daily, Do not crush or chew.    Lantus Solostar U-100 Insulin 20-25 Units, subcutaneous, Nightly, Take as directed per insulin instructions.    metoprolol succinate XL (TOPROL-XL) 12.5 mg, oral, Nightly    nitroglycerin (NITROSTAT) 0.4 mg, sublingual, Every 5 min PRN, Up to 3 times.    pen needle, diabetic (BD Ultra-Fine Sameera Pen Needle) 32 gauge x 5/32\" needle USE AS DIRECTED TO TEST THREE TIMES A DAY    potassium chloride CR (Klor-Con) 10 mEq ER tablet 10 mEq, oral, Daily, Do not crush, chew, or split.    semaglutide (OZEMPIC) 1 mg, subcutaneous, Once Weekly    semaglutide 0.5 mg, subcutaneous, Weekly    sodium hyaluronate (Durolane) 60 mg/3 mL injection intra-articular    valsartan-hydrochlorothiazide (Diovan-HCT) 160-25 mg tablet 1 tablet, oral, Daily       Physical Exam:  General:  Patient is awake, alert, and oriented.  Patient is in no acute distress.  HEENT:  Pupils equal and reactive.  Normocephalic.  Moist mucosa.    Neck:  No thyromegaly.  Normal Jugular Venous Pressure.  Cardiovascular:  Regular rate and rhythm.  Normal S1 and S2.  Pulmonary:  Clear to auscultation bilaterally.  Abdomen:  Soft. Non-tender.   Non-distended.  Positive bowel sounds.  Lower Extremities:  2+ pedal pulses. No LE edema.  Neurologic:  Cranial nerves intact.  No focal deficit.   Skin: Skin warm and dry, normal skin turgor.   Psychiatric: Normal affect.     Assessment/Plan   Cards: [facility]  EP : Lenin Franklin is a 74 y.o. " "year old female patient with h/o  CAD s/p D2 POBA 1/19 (LAD ), s/p MI 1/19, HFrEF/CM, parox AF/FL, HTN, HLD, DM, CVA 9/17 (no resid), breast Ca s/p mast now w/ ARANDA/edema c/w decompensation presents to the ER with shortness of breath.  Cardiology is now consulted for \"SOB, CHF exacerbation, elevated troponins, COVID +, BNP 2774 with orthopnea and increased SOB on exertion\"    #SOB 2/2 COVID PN-chest xray bibasiliar infiltrates   #Chronic HFrEF/CM-admit BNP 2774, minimal congestion on CT scan, warm on exam, on room air   #Parox AF/FL-on Xarelto   #Elevated Troponin 2/2 Covid-Non-MI troponin elevation / acute non-traumatic myocardial injury. Core measures do not apply.   #HTN   #HLD    RECS:  -Increase metoprolol succinate to 25mg daily  -c/w home dose of Lasix for now  -Daily standing weights, 2gm sodium diet, 2L fluid restriction, strict I&Os keep k >4, Mag>2, replace as needed    Code Status:  Full Code    Gloria Montaño, SYL-RUT     ==========================  Attending note  ==========================  Both the JASON and I have had a face to face encounter with the patient today. I have examined the patient and edited the documented physical examination as necessary.  I personally reviewed the patient's recent labs, medications, orders, EKGs, and pertinent cardiac imaging.  I have reviewed the JASON's encounter note, approve the JASON's documentation and have edited the note to reflect the diagnostic and therapeutic plan.    74-year-old female with a history of CAD (s/p D2 POBA 1/19 for LAD ), prior MI (1/19), HFrEF/CM, paroxysmal AF/flutter, hypertension, hyperlipidemia, diabetes mellitus, CVA (9/17, no residual deficits), and breast cancer (s/p mastectomy) presents with worsening dyspnea on exertion and orthopnea over the past two weeks, now diagnosed with CHF exacerbation and COVID-19 infection. She denies lower extremity swelling, fever, chills, nausea, vomiting, diarrhea, or recent falls and reports " "adherence to home medications. Exam reveals , /86, and SpO2 98% on room air. Admission labs show BNP 2774, troponins 29>36, BUN/Cr 22/1.16, and H&H 11.6/35.3; chest X-ray demonstrates bibasilar airspace opacities. In the ED, she received her home medications, IV Lasix 40 mg, and remdesivir. Home medications include amlodipine 5 mg, aspirin 81 mg, atorvastatin 80 mg, Jardiance 10 mg, Lasix 20 mg, Imdur 60 mg, metoprolol succinate 12.5 mg, Xarelto 20 mg, and valsartan-HCTZ 160/25 mg daily. Cardiology consulted for management.    Past medical history:  As above.    Medications were reviewed.    Allergies were reviewed.    Vital signs, telemetry, medications, labs, and imaging were reviewed as well.    Physical Exam:  General:  Patient is awake, alert, and oriented.  Patient is in no acute distress.  HEENT:  Pupils equal and reactive.  Normocephalic.  Moist mucosa.    Neck:  No thyromegaly.  Normal Jugular Venous Pressure.  Cardiovascular:  Regular rate and rhythm.  Normal S1 and S2.  Pulmonary:  Clear to auscultation bilaterally.  Abdomen:  Soft. Non-tender.   Non-distended.  Positive bowel sounds.  Lower Extremities:  2+ pedal pulses. No LE edema.  Neurologic:  Cranial nerves intact.  No focal deficit.   Skin: Skin warm and dry, normal skin turgor.   Psychiatric: Normal affect.     Assessment/Plan   Cards: [facility] 1/2024  EP : Lenin     Keesha Franklin is a 74 y.o. year old female patient with h/o  CAD s/p D2 POBA 1/19 (LAD ), s/p MI 1/19, HFrEF/CM, parox AF/FL, HTN, HLD, DM, CVA 9/17 (no resid), breast Ca s/p mast now w/ ARANDA/edema c/w decompensation presents to the ER with shortness of breath.  Cardiology is now consulted for \"SOB, CHF exacerbation, elevated troponins, COVID +, BNP 2774 with orthopnea and increased SOB on exertion\"    #SOB 2/2 COVID PN-chest xray bibasiliar infiltrates   #Chronic HFrEF/CM-admit BNP 2774, minimal congestion on CT scan, warm on exam, on room air   #Parox AF/FL-on Xarelto "   #Elevated Troponin 2/2 Covid-Non-MI troponin elevation / acute non-traumatic myocardial injury. Core measures do not apply.   #HTN   #HLD    RECS:  -Increase metoprolol succinate to 25mg daily  -Increase home dose of Lasix to 40mg a day  -Daily standing weights, 2gm sodium diet, 2L fluid restriction, strict I&Os keep k >4, Mag>2, replace as needed    Terrell Wakefield MD

## 2025-01-20 NOTE — PROGRESS NOTES
Pharmacy Medication History     Source of Information: Per patient     Additional concerns with the patient's PTA list.   N/a  The following updates were made to the Prior to Admission medication list:     Medications ADDED:   N/a  Medications CHANGED:  Lantus is prn since taking ozempic she hasn't really needed it   Medications REMOVED:   Gabapentin   Glipizide   Medications NOT TAKING:   Potassium   Alprazolam     Allergy reviewed : Yes    Meds 2 Beds : No    Outpatient pharmacy confirmed and updated in chart : Yes    Ph[facility] name: [facility]     The list below reflectives the updated PTA list. Please review each medication in order reconciliation for additional clarification and justification.    Prior to Admission Medications   Prescriptions Last Dose Informant              apixaban (Eliquis) 5 mg tablet 2025    Sig: Take 1 tablet (5 mg) by mouth 2 times a day.   aspirin 81 mg EC tablet 2025 Morning    Sig: Take 1 tablet (81 mg) by mouth once daily.   atorvastatin (Lipitor) 80 mg tablet 2025    Sig: Take 1 tablet (80 mg) by mouth once daily at bedtime.   blood sugar diagnostic (Accu-Chek Tiffanie Plus test strp) strip     Sig: USE TO TEST THREE TIMES A DAY   blood-glucose sensor (Dexcom G7 Sensor) device     Si kit once daily.   escitalopram (Lexapro) 10 mg tablet 2025    Sig: Take 1 tablet (10 mg) by mouth once daily.   furosemide (Lasix) 20 mg tablet 2025    Sig: TAKE 2 TABLETS(40 MG) BY MOUTH DAILY   Patient taking differently: Take 2 tablets (40 mg) by mouth once daily.   incontinence pad, liner, disp pad     Si each 2 times a day.              insulin glargine (Lantus Solostar U-100 Insulin) 100 unit/mL (3 mL) pen     Sig: Inject 20-25 Units under the skin once daily at bedtime. Take as directed per insulin instructions.   Patient taking differently: Inject 18-20 Units under the skin as needed at bedtime (blood sugar). Take as directed per insulin instructions.  "  isosorbide mononitrate ER (Imdur) 60 mg 24 hr tablet 1/19/2025    Sig: Take 1 tablet (60 mg) by mouth once daily. Do not crush or chew.   metoprolol succinate XL (Toprol-XL) 25 mg 24 hr tablet 1/19/2025    Sig: Take 0.5 tablets (12.5 mg) by mouth once daily at bedtime.   nitroglycerin (Nitrostat) 0.4 mg SL tablet     Sig: Place 1 tablet (0.4 mg) under the tongue every 5 minutes if needed for chest pain. Up to 3 times.   pen needle, diabetic (BD Ultra-Fine Sameera Pen Needle) 32 gauge x 5/32\" needle     Sig: USE AS DIRECTED TO TEST THREE TIMES A DAY              semaglutide (OZEMPIC) 1 mg/dose (4 mg/3 mL) pen injector 1/14/2025    Sig: Inject 1 mg under the skin 1 (one) time per week.   Patient taking differently: Inject 1 mg under the skin 1 (one) time per week. Tues              valsartan-hydrochlorothiazide (Diovan-HCT) 160-25 mg tablet 1/19/2025    Sig: Take 1 tablet by mouth once daily.      Facility-Administered Medications: None       The list below reflectives the updated allergy list. Please review each documented allergy for additional clarification and justification.    Allergies   Allergen Reactions    Jardiance [Empagliflozin] Swelling     Legs become swollen and painful with shooting pains    Lisinopril Angioedema and Unknown          01/20/25 at 3:31 PM - Adrianna Badillo     "

## 2025-01-20 NOTE — CARE PLAN
The patient's goals for the shift include  pt will remain safe and comfortable throughout the  shift.    The clinical goals for the shift include pt will remain safe and stable throughout the shift.

## 2025-01-20 NOTE — ED PROVIDER NOTES
HPI   Chief Complaint   Patient presents with    Shortness of Breath       Patient is a 74-year-old female Pmhx hypertension, CKD, chronic systolic heart failure, atrial fibrillation, history of CVA with dysarthria, depression presenting to the ER with concern for shortness of breath.  Patient states her symptoms started around a week and a half ago.  She is more short of breath whenever she walks more than a couple steps.  Additionally endorses a cough that is mostly dry and sometimes productive of yellow mucus.  Additionally endorses orthopnea and paroxysmal nocturnal dyspnea as well as increased leg swelling.  Denies fever, chills, sore throat, nausea, vomiting, diarrhea, abdominal pain, and myalgias.  Denies chest pain, wheezing, hemoptysis, palpitations, leg pain.  No recent travel, surgery, immobilization, hormone use, previous blood clot.          Patient History   Past Medical History:   Diagnosis Date    Adjustment disorder with depressed mood 11/11/2014    Grief reaction    Body mass index (BMI) 35.0-35.9, adult 08/30/2021    Body mass index (BMI) of 35.0 to 35.9 in adult    Cardiac murmur, unspecified 06/18/2021    Systolic murmur    Cerebral infarction, unspecified (Multi) 10/25/2022    Acute cerebrovascular accident (CVA)    Chronic kidney disease, stage 3a (Multi) 04/19/2022    Chronic renal impairment, stage 3a    Encounter for general adult medical examination without abnormal findings 01/28/2021    Medicare annual wellness visit, subsequent    Encounter for screening for malignant neoplasm of colon 11/07/2019    Colon cancer screening    Encounter for screening for malignant neoplasm of colon 01/28/2021    Colon cancer screening    Essential (primary) hypertension 10/25/2022    Benign essential hypertension    Mammographic microcalcification found on diagnostic imaging of breast 09/09/2014    Abnormal mammogram with microcalcification    Menopausal and female climacteric states 11/07/2019     Menopause syndrome    Obesity, unspecified 06/30/2022    Class 2 obesity with body mass index (BMI) of 35.0 to 35.9 in adult    Pain in left foot 08/01/2022    Left foot pain    Pain in left hip 12/20/2016    Left hip pain    Personal history of other endocrine, nutritional and metabolic disease 10/25/2022    History of diabetes mellitus    Polyneuropathy, unspecified 08/01/2022    Peripheral neuropathy    Primary osteoarthritis, unspecified ankle and foot 08/01/2022    Arthritis of foot    Type 2 diabetes mellitus with diabetic chronic kidney disease (Multi) 08/30/2021    Diabetes mellitus with stage 3a chronic kidney disease, without long-term current use of insulin     Past Surgical History:   Procedure Laterality Date    BREAST LUMPECTOMY  06/03/2014    Breast Surgery Lumpectomy    EXPLORATORY LAPAROTOMY  06/03/2014    Exploratory Laparotomy    MASTECTOMY  06/30/2022    Breast Surgery Mastectomy    MR HEAD ANGIO WO IV CONTRAST  9/5/2017    MR HEAD ANGIO WO IV CONTRAST 9/5/2017 [facility]    MR NECK ANGIO WO IV CONTRAST  9/5/2017    MR NECK ANGIO WO IV CONTRAST 9/5/2017 [facility]     Family History   Problem Relation Name Age of Onset    Hypertension Mother      Coronary artery disease Father      Other (cardiac disorder) Father      Hypertension Sister      COPD Sister      COPD Brother       Social History     Tobacco Use    Smoking status: Never     Passive exposure: Never    Smokeless tobacco: Never   Vaping Use    Vaping status: Never Used   Substance Use Topics    Alcohol use: Not Currently     Alcohol/week: 1.0 standard drink of alcohol     Types: 1 Standard drinks or equivalent per week    Drug use: Never       Physical Exam   ED Triage Vitals [01/20/25 0923]   Temperature Heart Rate Respirations BP   35.8 °C (96.5 °F) (!) 115 18 (!) 145/102      Pulse Ox Temp Source Heart Rate Source Patient Position   96 % Tympanic -- Sitting      BP Location FiO2 (%)     Right arm --       Physical  Exam  Constitutional:       General: She is not in acute distress.     Appearance: She is well-developed and normal weight. She is not ill-appearing, toxic-appearing or diaphoretic.   HENT:      Head: Normocephalic and atraumatic.      Mouth/Throat:      Mouth: Mucous membranes are moist.      Pharynx: Oropharynx is clear. No pharyngeal swelling or oropharyngeal exudate.   Eyes:      Extraocular Movements: Extraocular movements intact.      Pupils: Pupils are equal, round, and reactive to light.   Neck:      Vascular: No JVD.   Cardiovascular:      Rate and Rhythm: Normal rate and regular rhythm. No extrasystoles are present.     Heart sounds:      No friction rub. Gallop present.      Comments: S4 gallop  Pulmonary:      Effort: Pulmonary effort is normal. No tachypnea, accessory muscle usage or respiratory distress.      Breath sounds: Normal breath sounds. No stridor. No decreased breath sounds, wheezing, rhonchi or rales.      Comments: No respiratory distress. No increased work of breathing. Pt is able to speak in complete sentences. No active coughing.  Chest:      Chest wall: No mass, deformity or tenderness.   Abdominal:      Palpations: Abdomen is soft. There is no hepatomegaly or mass.   Musculoskeletal:         General: Normal range of motion.      Cervical back: Normal range of motion and neck supple.      Right lower leg: Edema present.      Left lower leg: Edema present.   Lymphadenopathy:      Cervical: No cervical adenopathy.   Skin:     General: Skin is warm and dry.      Capillary Refill: Capillary refill takes less than 2 seconds.   Neurological:      General: No focal deficit present.      Mental Status: She is alert.   Psychiatric:         Mood and Affect: Mood normal.         Behavior: Behavior normal.           ED Course & MDM   ED Course as of 01/20/25 1455   Mon Jan 20, 2025   1007 Patient is a 74-year-old female presents the ED with concern for shortness of breath.  Differentials include  heart failure exacerbation, PE, pneumonia, COVID, flu, RSV.  Ordered CBC, CMP, BNP, troponin, COVID, flu, RSV, EKG, chest x-ray for further evaluation. [VS]   1115 IMPRESSION:  1.  Bibasilar airspace opacities.  Signed by: Marcus Mendes 1/20/2025 11:06 AM  Dictation workstation:   [id number] [VS]   1120 Coronavirus 2019, PCR(!): Detected [VS]   1453 Pt had 500 mL UOP after 40 mg of lasix. Patient staffed with attending physician Dr. Ellsworth. Will obtain CT chest non con to evaluate for possible consolidation. Patient was recommended for inpatient treatment.  Spoke to hospitalist over phone.  Hospitalize under Dr. Hogue per their orders. Discussed findings and treatment done here in ED with admitting physician. It would be a risk to discharge the patient in their condition due to possibility of deterioration in their condition and the need for urgent interventions.    [VS]      ED Course User Index  [VS] Rashaad Orourke PA-C         Diagnoses as of 01/20/25 7848   COVID   Acute on chronic congestive heart failure, unspecified heart failure type   Shortness of breath                 No data recorded     [place] Coma Scale Score: 15 (01/20/25 0922 : Rubin Monteiro, EMT)                     Medical Decision Making    Procedure  Procedures     Rashaad Orourke PA-C  01/20/25 0301

## 2025-01-20 NOTE — PROGRESS NOTES
Transitional Care Coordination Progress Note:  Plan per Medical/Surgical team: treatment of COVID +, SOB with IV lasix, CTSCAN pending   Status: Inpatient   Payor source: Flaquita patrick  Discharge disposition: Home   Potential Barriers: /101  ADOD: 1/22/2025  IRENE Corona RN, BSN Transitional Care Coordinator ED# [phone]      01/20/25 1322   Discharge Planning   Living Arrangements Alone   Support Systems Spouse/significant other;Family members   Assistance Needed Covid +, IV lasix   Type of Residence Private residence   Number of Stairs to Enter Residence 2   Number of Stairs Within Residence 0  (12 to basement)   Home or Post Acute Services None   Expected Discharge Disposition Home   Does the patient need discharge transport arranged? No   Financial Resource Strain   How hard is it for you to pay for the very basics like food, housing, medical care, and heating? Not hard   Housing Stability   In the last 12 months, was there a time when you were not able to pay the mortgage or rent on time? N   In the past 12 months, how many times have you moved where you were living? 1   At any time in the past 12 months, were you homeless or living in a shelter (including now)? N   Transportation Needs   In the past 12 months, has lack of transportation kept you from medical appointments or from getting medications? no   In the past 12 months, has lack of transportation kept you from meetings, work, or from getting things needed for daily living? No   Stroke Family Assessment   Stroke Family Assessment Needed No   Intensity of Service   Intensity of Service 0-30 min

## 2025-01-20 NOTE — H&P
History Of Present Illness  Keesha Franklin is a 74 y.o. female presenting with shortness of breath, COVID-positive, elevated troponins, questionable bilateral pneumonia, CHF exacerbation.  Patient has a history of hypertension, A-fib, CKD, systolic heart failure, CVA, type 2 diabetes etc.  States she has been short of breath with a productive cough for the last 10 days.  The shortness of breath was worse with walking and with laying down but is there in general even at rest.  She denies any chest pain.  She has had increased leg swelling.  No other symptoms.  Patient is on Eliquis for the A-fib and Lasix for heart failure and edema but has not taken any of her medications for the 10 days that she has been ill.  Patient is also diabetic but discontinued the Lantus insulin over a month ago.  She is on Ozempic and Jardiance and glipizide.  Blood sugars have been very well-controlled and her primary agreed with holding the Lantus insulin for the past month or more.    Past medical history: Hypertension, CKD, systolic heart failure, atrial fibrillation, CVA, depression and anxiety, type 2 diabetes, CAD, osteoarthritis, dyspnea on exertion, hyperlipidemia, edema, neuropathy.  Obstructive sleep apnea is listed in the EMR but the patient is not on CPAP and was not aware of that diagnosis.    Medications: Xanax, Ozempic once a week, Eliquis, Diovan/hydrochlorothiazide, aspirin, atorvastatin, escitalopram, Lasix 40 mg a day, gabapentin prescribed 3 times daily but the patient only takes it daily, glipizide, Imdur, Toprol-XL, Klor-Con, Jardiance, nitro as needed.  Patient states she is on Jardiance but it is not seen in any of the medication list.  Again patient has not been taking any of her medicine for 10 days and has not been on insulin for over a month.    Allergies: Lisinopril    Social history: Drinks alcohol occasionally, denies tobacco use    ED course:.  CMP is within normal limits other than glucose of 239,  creatinine is 1.16 which is consistent or improved from previous studies, GFR is 49, ALT slightly elevated at 53, AST is 45.  The rest of the CMP is within normal limits  BNP is elevated at 2774  Initial troponin was 29, second troponin is 36, third troponin is pending  CBC shows leukopenia with a white count of 4.0, mild anemia, platelets are normal.  No left shift.  Patient was positive for COVID but negative for influenza or RSV  Chest x-ray shows bibasilar airspace opacities  CT chest is pending to rule out pneumonia  EKG showed sinus tach with PVCs and a heart rate of 119 and an incomplete right bundle branch block.  Similar to previous except for the rate.  Patient was treated with 40 of Lasix IV in the emergency department and admitted to medicine.     Past Medical History:   Diagnosis Date    Adjustment disorder with depressed mood 11/11/2014    Grief reaction    Body mass index (BMI) 35.0-35.9, adult 08/30/2021    Body mass index (BMI) of 35.0 to 35.9 in adult    Cardiac murmur, unspecified 06/18/2021    Systolic murmur    Cerebral infarction, unspecified (Multi) 10/25/2022    Acute cerebrovascular accident (CVA)    Chronic kidney disease, stage 3a (Multi) 04/19/2022    Chronic renal impairment, stage 3a    Encounter for general adult medical examination without abnormal findings 01/28/2021    Medicare annual wellness visit, subsequent    Encounter for screening for malignant neoplasm of colon 11/07/2019    Colon cancer screening    Encounter for screening for malignant neoplasm of colon 01/28/2021    Colon cancer screening    Essential (primary) hypertension 10/25/2022    Benign essential hypertension    Mammographic microcalcification found on diagnostic imaging of breast 09/09/2014    Abnormal mammogram with microcalcification    Menopausal and female climacteric states 11/07/2019    Menopause syndrome    Obesity, unspecified 06/30/2022    Class 2 obesity with body mass index (BMI) of 35.0 to 35.9 in  adult    Pain in left foot 08/01/2022    Left foot pain    Pain in left hip 12/20/2016    Left hip pain    Personal history of other endocrine, nutritional and metabolic disease 10/25/2022    History of diabetes mellitus    Polyneuropathy, unspecified 08/01/2022    Peripheral neuropathy    Primary osteoarthritis, unspecified ankle and foot 08/01/2022    Arthritis of foot    Type 2 diabetes mellitus with diabetic chronic kidney disease (Multi) 08/30/2021    Diabetes mellitus with stage 3a chronic kidney disease, without long-term current use of insulin     Past Surgical History:   Procedure Laterality Date    BREAST LUMPECTOMY  06/03/2014    Breast Surgery Lumpectomy    EXPLORATORY LAPAROTOMY  06/03/2014    Exploratory Laparotomy    MASTECTOMY  06/30/2022    Breast Surgery Mastectomy    MR HEAD ANGIO WO IV CONTRAST  9/5/2017    MR HEAD ANGIO WO IV CONTRAST 9/5/2017 [facility]    MR NECK ANGIO WO IV CONTRAST  9/5/2017    MR NECK ANGIO WO IV CONTRAST 9/5/2017 [facility]     Social History     Tobacco Use    Smoking status: Never     Passive exposure: Never    Smokeless tobacco: Never   Vaping Use    Vaping status: Never Used   Substance Use Topics    Alcohol use: Not Currently     Alcohol/week: 1.0 standard drink of alcohol     Types: 1 Standard drinks or equivalent per week    Drug use: Never        Family History  Family History   Problem Relation Name Age of Onset    Hypertension Mother      Coronary artery disease Father      Other (cardiac disorder) Father      Hypertension Sister      COPD Sister      COPD Brother          Allergies  Jardiance [empagliflozin] and Lisinopril    Review of Systems  Patient denies chest pain, , nausea, vomiting, fever, chills, diarrhea, constipation,  vision changes, rashes, dysuria, paresthesias, vertigo, headache,  cold symptoms, or any other complaints at this time. A complete review of systems was done, and is as stated in the history of present illness, is  otherwise negative or not pertinent to the complaint.    Physical Exam  Physical exam: Vital signs and nurses notes were reviewed.  SpO2 is 98% on room air, heart rate 108, blood pressure 135/106    General:  no acute distress. Alert and oriented  x 4, talks easily in full sentences.     Head: atraumatic and normocephalic    Eyes:  EOMs are intact, conjunctivae is not injected.    Oropharynx:  no trismus or drooling, buccal mucosa is moist.    Ears:  normal external exam, no swelling or erythema,     Nasal: normal external exam,     Neck: Supple, full range of motion,     Cardiac: Tachycardic rate, regular rhythm, no murmurs noted.     Pulmonary: Lungs clear bilaterally with good aeration. No adventitious breath sounds. No wheezes rales or rhonchi. No accessory muscle use no retraction noted.    Abdomen: Soft,  Nontender. No guarding, rigidity, or distention. Normoactive bowel sounds. No pulsatile masses, no bruits.     Extremities:  Full range of motion.  2+ pitting edema in the lower legs bilaterally    Skin: No rash seen. Skin is warm and dry     Neuro: Patient is alert and oriented x4. Speech is clear. There is no asymmetry with facial grimaces, and no tongue deviation. Patient moves all extremities independently. Sensation is intact. No obvious neuro deficits are noted.     Last Recorded Vitals  /90 (BP Location: Right arm, Patient Position: Sitting)   Pulse 108   Temp 36.1 °C (97 °F) (Temporal)   Resp 18   Wt 78 kg (172 lb)   SpO2 97%     Relevant Results  Scheduled medications  apixaban, 5 mg, oral, BID  aspirin, 81 mg, oral, Daily  atorvastatin, 80 mg, oral, Nightly  azithromycin, 500 mg, intravenous, q24h  escitalopram, 10 mg, oral, Daily  [START ON 1/21/2025] furosemide, 40 mg, intravenous, Daily  insulin lispro, 0-5 Units, subcutaneous, TID AC  isosorbide mononitrate ER, 60 mg, oral, Daily  metoprolol succinate XL, 25 mg, oral, Nightly  valsartan 160 mg, hydroCHLOROthiazide 12.5 mg for Diovan  HCT, , oral, Daily      Continuous medications     PRN medications  PRN medications: acetaminophen **OR** acetaminophen **OR** acetaminophen, dextrose, dextrose, glucagon, glucagon, nitroglycerin, ondansetron ODT **OR** ondansetron, oxygen, sennosides    Results for orders placed or performed during the hospital encounter of 01/20/25 (from the past 24 hours)   CBC and Auto Differential   Result Value Ref Range    WBC 4.0 (L) 4.4 - 11.3 x10*3/uL    nRBC 0.0 0.0 - 0.0 /100 WBCs    RBC 4.02 4.00 - 5.20 x10*6/uL    Hemoglobin 11.6 (L) 12.0 - 16.0 g/dL    Hematocrit 35.3 (L) 36.0 - 46.0 %    MCV 88 80 - 100 fL    MCH 28.9 26.0 - 34.0 pg    MCHC 32.9 32.0 - 36.0 g/dL    RDW 15.7 (H) 11.5 - 14.5 %    Platelets 289 150 - 450 x10*3/uL    Neutrophils % 71.9 40.0 - 80.0 %    Immature Granulocytes %, Automated 0.8 0.0 - 0.9 %    Lymphocytes % 20.7 13.0 - 44.0 %    Monocytes % 6.3 2.0 - 10.0 %    Eosinophils % 0.0 0.0 - 6.0 %    Basophils % 0.3 0.0 - 2.0 %    Neutrophils Absolute 2.86 1.60 - 5.50 x10*3/uL    Immature Granulocytes Absolute, Automated 0.03 0.00 - 0.50 x10*3/uL    Lymphocytes Absolute 0.82 0.80 - 3.00 x10*3/uL    Monocytes Absolute 0.25 0.05 - 0.80 x10*3/uL    Eosinophils Absolute 0.00 0.00 - 0.40 x10*3/uL    Basophils Absolute 0.01 0.00 - 0.10 x10*3/uL   Comprehensive metabolic panel   Result Value Ref Range    Glucose 239 (H) 74 - 99 mg/dL    Sodium 141 136 - 145 mmol/L    Potassium 4.1 3.5 - 5.3 mmol/L    Chloride 107 98 - 107 mmol/L    Bicarbonate 26 21 - 32 mmol/L    Anion Gap 12 10 - 20 mmol/L    Urea Nitrogen 22 6 - 23 mg/dL    Creatinine 1.16 (H) 0.50 - 1.05 mg/dL    eGFR 49 (L) >60 mL/min/1.73m*2    Calcium 9.0 8.6 - 10.3 mg/dL    Albumin 3.7 3.4 - 5.0 g/dL    Alkaline Phosphatase 89 33 - 136 U/L    Total Protein 6.8 6.4 - 8.2 g/dL    AST 45 (H) 9 - 39 U/L    Bilirubin, Total 0.9 0.0 - 1.2 mg/dL    ALT 53 (H) 7 - 45 U/L   Troponin I, High Sensitivity   Result Value Ref Range    Troponin I, High Sensitivity  29 (H) 0 - 13 ng/L   B-Type Natriuretic Peptide   Result Value Ref Range    BNP 2,774 (H) 0 - 99 pg/mL   Sars-CoV-2 and Influenza A/B PCR   Result Value Ref Range    Flu A Result Not Detected Not Detected    Flu B Result Not Detected Not Detected    Coronavirus 2019, PCR Detected (A) Not Detected   RSV PCR   Result Value Ref Range    RSV PCR Not Detected Not Detected   ECG 12 lead   Result Value Ref Range    Ventricular Rate 119 BPM    Atrial Rate 119 BPM    AR Interval 208 ms    QRS Duration 106 ms    QT Interval 328 ms    QTC Calculation(Bazett) 461 ms    R Axis -69 degrees    T Axis 95 degrees    QRS Count 20 beats    Q Onset 209 ms    T Offset 373 ms    QTC Fredericia 412 ms   Troponin I, High Sensitivity   Result Value Ref Range    Troponin I, High Sensitivity 36 (H) 0 - 13 ng/L   Troponin I, High Sensitivity   Result Value Ref Range    Troponin I, High Sensitivity 46 (H) 0 - 13 ng/L   CBC   Result Value Ref Range    WBC 5.4 4.4 - 11.3 x10*3/uL    nRBC 0.0 0.0 - 0.0 /100 WBCs    RBC 4.19 4.00 - 5.20 x10*6/uL    Hemoglobin 11.6 (L) 12.0 - 16.0 g/dL    Hematocrit 36.8 36.0 - 46.0 %    MCV 88 80 - 100 fL    MCH 27.7 26.0 - 34.0 pg    MCHC 31.5 (L) 32.0 - 36.0 g/dL    RDW 15.6 (H) 11.5 - 14.5 %    Platelets 298 150 - 450 x10*3/uL   POCT GLUCOSE   Result Value Ref Range    POCT Glucose 167 (H) 74 - 99 mg/dL     *Note: Due to a large number of results and/or encounters for the requested time period, some results have not been displayed. A complete set of results can be found in Results Review.     CT chest wo IV contrast   Final Result   Moderate bilateral pleural effusions, likely partially loculated on   the right, with adjacent parenchymal possible compressive atelectasis   or infiltrates.        Mild body wall/soft tissue edema.        Postsurgical changes in the left breast.        MACRO:   None.        Signed by: Daiana Méndez 1/20/2025 4:12 PM   Dictation workstation:   [id number]      XR chest 2 views    Final Result   1.  Bibasilar airspace opacities.             Signed by: Marcus Mendes 1/20/2025 11:06 AM   Dictation workstation:   [id number]             Assessment/Plan   Assessment & Plan  Shortness of breath    COVID-19    Acute exacerbation of CHF (congestive heart failure)    Pneumonia of both lower lobes    Elevated troponin    Pleural effusion, bilateral      Plan:  Cardiology consult pending for CHF, elevated troponins, shortness of breath  Telemetry and continuous pulse ox  Oxygen as needed  Lasix 40 mg IV daily  Monitor strict intake and output  Sliding scale lispro  Tylenol and Zofran ordered as needed  Senokot as needed  Daily CBC and BMP  Third troponin pending  CT chest ordered in the ED is pending  Patient's home meds including Xanax, Eliquis, Diovan/HCTZ, aspirin, atorvastatin, Lexapro, gabapentin, glipizide, Imdur, Toprol-XL, Klor-Con all ordered.  I did not order her Lantus as she has not been taking it for over a month and her hemoglobin A1c showed very well-controlled diabetes.  Jardiance was not ordered because I do not find it anywhere in her medication list.  At this time remdesivir is not indicated as the patient's oxygenation is 98% on room air and her symptoms started 10 days ago.  If the CT confirms pneumonia then we will start antibiotics.  Findings, orders, plan all discussed with Dr. Hassan    Addendum: This patient likely has COVID-pneumonia so he was started on azithromycin.  Blood cultures, and urine for Legionella and strep are ordered.  Per cardiology note I added his orders for daily standing weights, 2 g sodium diet, 2 L fluid restriction, keep potassium above 4 and magnesium above 2.  Magnesium is ordered for the morning draw.  He also has moderate bilateral pleural effusions.       Karen Fonseca PA-C

## 2025-01-20 NOTE — ED TRIAGE NOTES
Pt c/o chest congestion with sob for past week and a half that worsens with exertion. Pt denies cp, headache, dizziness. Endorses productive cough.

## 2025-01-21 ENCOUNTER — TELEPHONE (OUTPATIENT)
Dept: ENDOCRINOLOGY | Facility: CLINIC | Age: 75
End: 2025-01-21

## 2025-01-21 ENCOUNTER — APPOINTMENT (OUTPATIENT)
Dept: ENDOCRINOLOGY | Facility: CLINIC | Age: 75
End: 2025-01-21
Payer: COMMERCIAL

## 2025-01-21 LAB
ANION GAP SERPL CALC-SCNC: 11 MMOL/L (ref 10–20)
BUN SERPL-MCNC: 21 MG/DL (ref 6–23)
CALCIUM SERPL-MCNC: 9.1 MG/DL (ref 8.6–10.3)
CHLORIDE SERPL-SCNC: 105 MMOL/L (ref 98–107)
CO2 SERPL-SCNC: 27 MMOL/L (ref 21–32)
CREAT SERPL-MCNC: 1.07 MG/DL (ref 0.5–1.05)
EGFRCR SERPLBLD CKD-EPI 2021: 54 ML/MIN/1.73M*2
ERYTHROCYTE [DISTWIDTH] IN BLOOD BY AUTOMATED COUNT: 15.9 % (ref 11.5–14.5)
GLUCOSE BLD MANUAL STRIP-MCNC: 100 MG/DL (ref 74–99)
GLUCOSE BLD MANUAL STRIP-MCNC: 139 MG/DL (ref 74–99)
GLUCOSE BLD MANUAL STRIP-MCNC: 145 MG/DL (ref 74–99)
GLUCOSE BLD MANUAL STRIP-MCNC: 164 MG/DL (ref 74–99)
GLUCOSE SERPL-MCNC: 132 MG/DL (ref 74–99)
HCT VFR BLD AUTO: 35.2 % (ref 36–46)
HGB BLD-MCNC: 11 G/DL (ref 12–16)
HOLD SPECIMEN: NORMAL
LEGIONELLA AG UR QL: NEGATIVE
MAGNESIUM SERPL-MCNC: 1.96 MG/DL (ref 1.6–2.4)
MCH RBC QN AUTO: 27.9 PG (ref 26–34)
MCHC RBC AUTO-ENTMCNC: 31.3 G/DL (ref 32–36)
MCV RBC AUTO: 89 FL (ref 80–100)
NRBC BLD-RTO: 0 /100 WBCS (ref 0–0)
PLATELET # BLD AUTO: 298 X10*3/UL (ref 150–450)
POTASSIUM SERPL-SCNC: 3.9 MMOL/L (ref 3.5–5.3)
RBC # BLD AUTO: 3.94 X10*6/UL (ref 4–5.2)
S PNEUM AG UR QL: NEGATIVE
SODIUM SERPL-SCNC: 139 MMOL/L (ref 136–145)
WBC # BLD AUTO: 5.3 X10*3/UL (ref 4.4–11.3)

## 2025-01-21 PROCEDURE — 2500000002 HC RX 250 W HCPCS SELF ADMINISTERED DRUGS (ALT 637 FOR MEDICARE OP, ALT 636 FOR OP/ED): Performed by: PHYSICIAN ASSISTANT

## 2025-01-21 PROCEDURE — 2500000001 HC RX 250 WO HCPCS SELF ADMINISTERED DRUGS (ALT 637 FOR MEDICARE OP)

## 2025-01-21 PROCEDURE — 99232 SBSQ HOSP IP/OBS MODERATE 35: CPT

## 2025-01-21 PROCEDURE — 1100000001 HC PRIVATE ROOM DAILY

## 2025-01-21 PROCEDURE — 87040 BLOOD CULTURE FOR BACTERIA: CPT | Mod: AHULAB | Performed by: PHYSICIAN ASSISTANT

## 2025-01-21 PROCEDURE — 83735 ASSAY OF MAGNESIUM: CPT | Performed by: PHYSICIAN ASSISTANT

## 2025-01-21 PROCEDURE — 80048 BASIC METABOLIC PNL TOTAL CA: CPT | Performed by: PHYSICIAN ASSISTANT

## 2025-01-21 PROCEDURE — 99232 SBSQ HOSP IP/OBS MODERATE 35: CPT | Performed by: INTERNAL MEDICINE

## 2025-01-21 PROCEDURE — 82947 ASSAY GLUCOSE BLOOD QUANT: CPT

## 2025-01-21 PROCEDURE — 36415 COLL VENOUS BLD VENIPUNCTURE: CPT | Performed by: PHYSICIAN ASSISTANT

## 2025-01-21 PROCEDURE — 2500000004 HC RX 250 GENERAL PHARMACY W/ HCPCS (ALT 636 FOR OP/ED): Performed by: PHYSICIAN ASSISTANT

## 2025-01-21 PROCEDURE — 85027 COMPLETE CBC AUTOMATED: CPT | Performed by: PHYSICIAN ASSISTANT

## 2025-01-21 PROCEDURE — 2500000004 HC RX 250 GENERAL PHARMACY W/ HCPCS (ALT 636 FOR OP/ED): Performed by: INTERNAL MEDICINE

## 2025-01-21 PROCEDURE — 2500000002 HC RX 250 W HCPCS SELF ADMINISTERED DRUGS (ALT 637 FOR MEDICARE OP, ALT 636 FOR OP/ED): Performed by: INTERNAL MEDICINE

## 2025-01-21 PROCEDURE — 2500000004 HC RX 250 GENERAL PHARMACY W/ HCPCS (ALT 636 FOR OP/ED)

## 2025-01-21 PROCEDURE — 2500000001 HC RX 250 WO HCPCS SELF ADMINISTERED DRUGS (ALT 637 FOR MEDICARE OP): Performed by: PHYSICIAN ASSISTANT

## 2025-01-21 RX ORDER — FUROSEMIDE 40 MG/1
40 TABLET ORAL ONCE
Status: DISCONTINUED | OUTPATIENT
Start: 2025-01-21 | End: 2025-01-21

## 2025-01-21 RX ORDER — METOPROLOL SUCCINATE 50 MG/1
50 TABLET, EXTENDED RELEASE ORAL NIGHTLY
Status: DISCONTINUED | OUTPATIENT
Start: 2025-01-21 | End: 2025-01-22 | Stop reason: HOSPADM

## 2025-01-21 RX ORDER — METOPROLOL SUCCINATE 25 MG/1
25 TABLET, EXTENDED RELEASE ORAL ONCE
Status: COMPLETED | OUTPATIENT
Start: 2025-01-21 | End: 2025-01-21

## 2025-01-21 RX ORDER — AZITHROMYCIN 250 MG/1
250 TABLET, FILM COATED ORAL
Status: DISCONTINUED | OUTPATIENT
Start: 2025-01-21 | End: 2025-01-22 | Stop reason: HOSPADM

## 2025-01-21 RX ORDER — FUROSEMIDE 10 MG/ML
40 INJECTION INTRAMUSCULAR; INTRAVENOUS ONCE
Status: COMPLETED | OUTPATIENT
Start: 2025-01-21 | End: 2025-01-21

## 2025-01-21 RX ORDER — FUROSEMIDE 40 MG/1
40 TABLET ORAL DAILY
Status: DISCONTINUED | OUTPATIENT
Start: 2025-01-22 | End: 2025-01-22 | Stop reason: HOSPADM

## 2025-01-21 RX ADMIN — ASPIRIN 81 MG: 81 TABLET, COATED ORAL at 10:22

## 2025-01-21 RX ADMIN — APIXABAN 5 MG: 5 TABLET, FILM COATED ORAL at 10:22

## 2025-01-21 RX ADMIN — ATORVASTATIN CALCIUM 80 MG: 80 TABLET, FILM COATED ORAL at 21:45

## 2025-01-21 RX ADMIN — CEFTRIAXONE 2 G: 2 INJECTION, POWDER, FOR SOLUTION INTRAMUSCULAR; INTRAVENOUS at 12:44

## 2025-01-21 RX ADMIN — AZITHROMYCIN DIHYDRATE 250 MG: 250 TABLET ORAL at 21:45

## 2025-01-21 RX ADMIN — ISOSORBIDE MONONITRATE 60 MG: 30 TABLET, EXTENDED RELEASE ORAL at 10:22

## 2025-01-21 RX ADMIN — INSULIN LISPRO 1 UNITS: 100 INJECTION, SOLUTION INTRAVENOUS; SUBCUTANEOUS at 17:33

## 2025-01-21 RX ADMIN — ESCITALOPRAM OXALATE 10 MG: 10 TABLET ORAL at 10:22

## 2025-01-21 RX ADMIN — METOPROLOL SUCCINATE 50 MG: 50 TABLET, EXTENDED RELEASE ORAL at 21:44

## 2025-01-21 RX ADMIN — VALSARTAN: 160 TABLET, FILM COATED ORAL at 10:22

## 2025-01-21 RX ADMIN — APIXABAN 5 MG: 5 TABLET, FILM COATED ORAL at 21:44

## 2025-01-21 RX ADMIN — METOPROLOL SUCCINATE 25 MG: 25 TABLET, EXTENDED RELEASE ORAL at 13:24

## 2025-01-21 RX ADMIN — FUROSEMIDE 40 MG: 10 INJECTION, SOLUTION INTRAMUSCULAR; INTRAVENOUS at 14:06

## 2025-01-21 RX ADMIN — FUROSEMIDE 40 MG: 10 INJECTION, SOLUTION INTRAMUSCULAR; INTRAVENOUS at 11:22

## 2025-01-21 ASSESSMENT — PAIN - FUNCTIONAL ASSESSMENT: PAIN_FUNCTIONAL_ASSESSMENT: 0-10

## 2025-01-21 ASSESSMENT — COGNITIVE AND FUNCTIONAL STATUS - GENERAL
MOBILITY SCORE: 24
DAILY ACTIVITIY SCORE: 24

## 2025-01-21 ASSESSMENT — PAIN SCALES - GENERAL
PAINLEVEL_OUTOF10: 0 - NO PAIN
PAINLEVEL_OUTOF10: 0 - NO PAIN

## 2025-01-21 NOTE — PROGRESS NOTES
Subjective Data:  States she feels better  Still with with nasal canula  Better control of the blood pressure  No chest pain, no palpitations  At telemetry on sinus tachycardia    Overnight Events:    None     Objective Data:  Last Recorded Vitals:  Vitals:    01/20/25 2100 01/21/25 0040 01/21/25 0600 01/21/25 0858   BP: (!) 141/108 (!) 136/99  (!) 133/97   BP Location: Right arm Right arm  Right arm   Patient Position: Lying   Sitting   Pulse: 108 103     Resp: 18 18  18   Temp: 36.4 °C (97.5 °F) 36.4 °C (97.5 °F)  36.6 °C (97.9 °F)   TempSrc: Temporal Temporal  Temporal   SpO2: 97% 94%  97%   Weight:   80.9 kg (178 lb 6.4 oz)    Height:           Last Labs:  CBC - 1/21/2025:  5:41 AM  5.3 11.0 298    35.2      CMP - 1/21/2025:  5:41 AM  9.1 6.8 45 --- 0.9   _ 3.7 53 89      PTT - No results in last year.  _   _ _     TROPHS   Date/Time Value Ref Range Status   01/20/2025 03:30 PM 46 0 - 13 ng/L Final   01/20/2025 11:53 AM 36 0 - 13 ng/L Final   01/20/2025 10:09 AM 29 0 - 13 ng/L Final     BNP   Date/Time Value Ref Range Status   01/20/2025 10:09 AM 2,774 0 - 99 pg/mL Final   01/29/2024 01:49  0 - 99 pg/mL Final     HGBA1C   Date/Time Value Ref Range Status   12/13/2024 09:57 AM 5.8 See comment % Final   07/19/2024 11:10 AM 8.2 see below % Final   05/28/2024 11:18 AM 13.2 4.2 - 6.5 % Final   12/05/2023 11:32 AM 6.2 4.2 - 6.5 % Final     LDLCALC   Date/Time Value Ref Range Status   02/23/2024 02:10 PM 76 <=99 mg/dL Final     Comment:                                 Near   Borderline      AGE      Desirable  Optimal    High     High     Very High     0-19 Y     0 - 109     ---    110-129   >/= 130     ----    20-24 Y     0 - 119     ---    120-159   >/= 160     ----      >24 Y     0 -  99   100-129  130-159   160-189     >/=190       VLDL   Date/Time Value Ref Range Status   02/23/2024 02:10 PM 27 0 - 40 mg/dL Final   12/07/2022 07:40 AM 18 0 - 40 mg/dL Final   03/12/2021 10:24 AM 20 0 - 40 mg/dL Final    01/14/2019 04:52 AM 13 0 - 40 mg/dL Final      Last I/O:  I/O last 3 completed shifts:  In: 615 (7.6 mL/kg) [P.O.:360; IV Piggyback:255]  Out: - (0 mL/kg)   Weight: 80.9 kg     Past Cardiology Tests (Last 3 Years):  EKG:  ECG 12 lead 01/20/2025 (Preliminary)      ECG 12 Lead 09/17/2024      ECG 12 lead (Clinic Performed) 07/12/2024      ECG 12 Lead       ECG 12 Lead       ECG 12 lead (Clinic Performed) 01/29/2024    Echo:  Transthoracic echo (TTE) complete 02/07/2024    Ejection Fractions:  EF   Date/Time Value Ref Range Status   02/07/2024 01:58 PM 31 %      Cath:  No results found for this or any previous visit from the past 1095 days.    Stress Test:  Nuclear Stress Test 04/15/2024    Cardiac Imaging:  MR cardiac morphology and function w and wo IV contrast 10/15/2024      Inpatient Medications:  Scheduled medications   Medication Dose Route Frequency    apixaban  5 mg oral BID    aspirin  81 mg oral Daily    atorvastatin  80 mg oral Nightly    azithromycin  250 mg oral q24h GARRY    cefTRIAXone  2 g intravenous q24h    escitalopram  10 mg oral Daily    furosemide  40 mg oral Once    insulin lispro  0-5 Units subcutaneous TID AC    isosorbide mononitrate ER  60 mg oral Daily    metoprolol succinate XL  25 mg oral Nightly    valsartan 160 mg, hydroCHLOROthiazide 12.5 mg for Diovan HCT   oral Daily     PRN medications   Medication    acetaminophen    Or    acetaminophen    Or    acetaminophen    dextrose    dextrose    glucagon    glucagon    nitroglycerin    ondansetron ODT    Or    ondansetron    oxygen    sennosides     Continuous Medications   Medication Dose Last Rate       Physical Exam:  General:  Patient is awake, alert, and oriented.  Patient is in no acute distress.  HEENT:  Pupils equal and reactive.  Normocephalic.  Moist mucosa.    Neck:  No thyromegaly.  Normal Jugular Venous Pressure.  Cardiovascular:  Regular rate and rhythm.  Normal S1 and S2.  Pulmonary:  Clear to auscultation  "bilaterally.  Abdomen:  Soft. Non-tender.   Non-distended.  Positive bowel sounds.  Lower Extremities:  2+ pedal pulses. No LE edema.  Neurologic:  Cranial nerves intact.  No focal deficit.   Skin: Skin warm and dry, normal skin turgor.   Psychiatric: Normal affect.     Assessment/Plan   Cards: Nam 1/2024  EP : Lenin Franklin is a 74 y.o. year old female patient with h/o  CAD s/p D2 POBA 1/19 (LAD ), s/p MI 1/19, HFrEF/CM, parox AF/FL, HTN, HLD, DM, CVA 9/17 (no resid), breast Ca s/p mast now w/ ARANDA/edema c/w decompensation presents to the ER with shortness of breath.  Cardiology is now consulted for \"SOB, CHF exacerbation, elevated troponins, COVID +, BNP 2774 with orthopnea and increased SOB on exertion\"     #SOB 2/2 COVID PN-chest xray bibasiliar infiltrates   #Chronic HFrEF/CM-admit BNP 2774, minimal congestion on CT scan, warm on exam  #Parox AF/FL-on Xarelto   #Elevated Troponin 2/2 Covid-Non-MI troponin elevation / acute non-traumatic myocardial injury. Core measures do not apply.   #HTN   #HLD     RECS:  -Increase metoprolol succinate to 50mg daily  -I will add an extra dosis of lasix today (40mg IV). Increase the dose of Lasix to 40mg a day   -No other changes on therapy.  -Daily standing weights, 2gm sodium diet, 2L fluid restriction, strict I&Os keep k >4, Mag>2, replace as needed     Code Status:  Full Code    Terrell Wakefield MD  "

## 2025-01-21 NOTE — CONSULTS
"Nutrition Assessment Note  Nutrition Assessment      Reason for Assessment  Reason for Assessment: Admission nursing screening  Admitted with SOB, Covid-19.  MST for weight loss and decreased appetite.  Staff notes she was not concerned about weight loss, which is a side effect of several of her medication.  Staff reports her intake varies, but usually >50% of meals, if she likes them.    History:  Food and Nutrient History  Energy Intake: Fair 50-75 %  Food and Nutrient History: Had decreased appetite for past 10 days with increased SOB.    Diagnosis   Depression with anxiety   Arthritis of foot   Atypical ductal hyperplasia of breast   Benign essential hypertension   Chronic pain of both knees   Chronic renal impairment, stage 3a (Multi)   Hyperlipidemia   Menopause syndrome   LIYA (obstructive sleep apnea)   Osteoarthritis of both knees   Peripheral neuropathy   2-vessel coronary artery disease   Stroke, embolic (Multi)   Angioedema of lips   Chronic systolic heart failure   Personal history of malignant neoplasm of breast   Abnormal finding on breast imaging   Low vitamin D level   Suspected sleep apnea   A-fib (Multi)   ARANDA (dyspnea on exertion)   Primary osteoarthritis of left knee   Acute urinary tract infection   Edema   Fatigue   Snoring   Trichomoniasis   Hyperglycemia   Peripheral nerve disease   COVID-19   Shortness of breath   Acute exacerbation of CHF (congestive heart failure)   Pneumonia of both lower lobes   Elevated troponin   Pleural effusion, bilateral     Anthropometrics:  Height: 160 cm (5' 2.99\")  Weight: 80.9 kg (178 lb 6.4 oz)  BMI (Calculated): 31.61  Weight Change  Weight History / % Weight Change: 12/13/24: 78 kg, 7/12/1/29/24: 83kg.  Significant Weight Loss: No  IBW/kg (Dietitian Calculated): 52.3 kg   Amputation Calculations:     Energy Needs:  Estimated Energy Needs  Method for Estimating Needs: 1865-2025 @ 23-25 kcal/kg    Estimated Protein Needs  Method for Estimating Needs: 52-64 @ " 1.0-1.2 gr/kg IBW    Estimated Fluid Needs  Total Fluid Estimated Needs (mL): 1300 mL  Total Fluid Estimated Needs (mL/kg): 25 mL/kg     Dietary Orders  Adult diet Cardiac, Consistent Carb, 2-3 grams sodium; 2000 mL fluid; CCD 75 gm/meal;     Nutrition Focused Physical Findings: deferred per Covid-19 protocol     Labs: 01/21/25 05:41  GLUCOSE: 132 (H)  SODIUM: 139  POTASSIUM: 3.9  CHLORIDE: 105  Bicarbonate: 27  Anion Gap: 11  Blood Urea Nitrogen: 21  Creatinine: 1.07 (H)  EGFR: 54 (L)  Calcium: 9.1  MAGNESIUM: 1.96    Nutrition Diagnosis   Malnutrition Diagnosis  Patient has Malnutrition Diagnosis: No    Patient has Nutrition Diagnosis: Yes  Nutrition Diagnosis 1: Predicted inadequate energy intake  Diagnosis Status (1): New  Related to (1): acute illness  As Evidenced by (1): intake varies form %     Nutrition Interventions/Recommendations   Nutrition Prescription  Individualized Nutrition Prescription Provided for : recommend removing renal restriction.  Continue cardiac, consistent CHO, 2000FR diet.   MD to modify fluid restrictiona as appropriate,    Food and/or Nutrient Delivery Interventions  Meals and Snacks: Carbohydrate-modified diet, Mineral-modified diet, Fat-modified diet, Fluid-modified diet  Goal: intake meets >75% estimated nutrient needs.      Nutrition Monitoring and Evaluation   Amount of Food: Estimated amout of food  Criteria: intake >75% of meals    Anthropometrics: Body Composition/Growth/Weight History  Weight: Measured weight  Criteria: daily  Weight Change: Weight change percentage  Criteria: weekly    Biochemical Data, Medical Tests and Procedures  Electrolyte and Renal Panel: BUN, Calcium, serum, Chloride, Creatinine, Magnesium, Phosphorus, Potassium, Sodium  Criteria: as indicated  Glucose/Endocrine Profile: Glucose, casual, Glucose, fasting, Hemoglobin A1c (HgbA1c)  Criteria: as indicated    Nutrition Focused Physical Findings   Digestive System: Constipation, Diarrhea, Early  satiety, Nausea, Vomiting, Decrease in appetite, Increased appetite  Criteria: daily    Follow Up  Last Date of Nutrition Visit: 01/21/25  Nutrition Follow-Up Needed?: Dietitian to reassess per policy  Follow up Comment: CRAIG

## 2025-01-21 NOTE — PROGRESS NOTES
01/21/25 0900   Discharge Planning   Expected Discharge Disposition Home     Once med ready plan on discharge would be home, patient on IV zithro + covid, Cardio consult placed for CHF, patient on IV lasix for diuresis, I will continue to monitor for discharge planing.

## 2025-01-21 NOTE — TELEPHONE ENCOUNTER
This nurse called the patient to reschedule the patient's appointment or to offer a virtual appointment. There was no answer.  staff tried to reach the patient on 1/20/2025 with no success.

## 2025-01-21 NOTE — PROGRESS NOTES
Keesha Franklin is a 74 y.o. female on day 1 of admission presenting with Shortness of breath.      Subjective   Patient was seen and examined at bedside this morning, stated that she is feeling much better compared to admission, nasal cannula at bedside, but was not wearing it, and did not complain of shortness of breath.       Objective     Last Recorded Vitals  /82   Pulse 103   Temp 36.6 °C (97.9 °F) (Temporal)   Resp 18   Wt 80.9 kg (178 lb 6.4 oz)   SpO2 97%   Intake/Output last 3 Shifts:    Intake/Output Summary (Last 24 hours) at 1/21/2025 1709  Last data filed at 1/21/2025 1439  Gross per 24 hour   Intake 855 ml   Output 300 ml   Net 555 ml       Admission Weight  Weight: 78 kg (172 lb) (01/20/25 0923)    Daily Weight  01/21/25 : 80.9 kg (178 lb 6.4 oz)    Image Results  CT chest wo IV contrast  Narrative: Interpreted By:  Daiana Méndez,   STUDY:  CT CHEST WO IV CONTRAST;  1/20/2025 2:40 pm      INDICATION:  Signs/Symptoms:Shortness of breath.          COMPARISON:  Chest x-ray obtained earlier the same day, CT chest 04/23/2019      ACCESSION NUMBER(S):  [id number]      ORDERING CLINICIAN:  SANDRITA WINKLER      TECHNIQUE:  CT of the chest was performed. Sagittal and coronal reconstructions  were generated. No intravenous contrast given for the examination.      FINDINGS:  Hilar, vessel, and solid organ evaluation limited without IV contrast.      CHEST WALL AND LOWER NECK: Patient again noted be status post left  mastectomy with surgical clips in the axilla and linear density in  the anterior chest wall image 127/280. soft tissue edema in the  included lower chest and upper abdominal walls.      MEDIASTINUM AND DIPIKA:  Limited hilar assessment on unenhanced exam.  No significant mediastinal or hilar adenopathy within limits of  unenhanced study. Probable small hiatal hernia.      HEART AND VESSELS:  Lack of IV contrast precludes vascular luminal  assessment. The heart is normal in size. Mild  pericardial  fluid/thickening.      LUNGS, PLEURA, LARGE AIRWAYS:  Moderate bilateral pleural effusions  with on the right with slightly lobulated margins and extending into  the main fissure. Adjacent mild atelectasis and or infiltrates.  Minimal emphysematous changes. The central airways are patent.      UPPER ABDOMEN:  Soft tissue density possible splenules measuring up  to 2.1 cm with small calcification, similar to the prior exam.      BONES:  Diffuse osteopenia. Multilevel smooth endplate depression  similar to the previous exam.      Impression: Moderate bilateral pleural effusions, likely partially loculated on  the right, with adjacent parenchymal possible compressive atelectasis  or infiltrates.      Mild body wall/soft tissue edema.      Postsurgical changes in the left breast.      MACRO:  None.      Signed by: Daiana Méndez 1/20/2025 4:12 PM  Dictation workstation:   [id number]  ECG 12 lead  Sinus tachycardia with Premature supraventricular complexes  Left axis deviation  Incomplete right bundle branch block  Inferior infarct , age undetermined  Possible Anterior infarct (cited on or before 16-SEP-2023)  Abnormal ECG  When compared with ECG of 17-SEP-2024 08:22,  Vent. rate has increased BY  45 BPM  QRS duration has decreased  Inferior infarct is now Present  Questionable change in initial forces of Anterolateral leads  XR chest 2 views  Narrative: Interpreted By:  Marcus Mendes,   STUDY:  XR CHEST 2 VIEWS;  1/20/2025 10:33 am      INDICATION:  Signs/Symptoms:Shortness of breath.      COMPARISON:  Chest radiograph 01/29/2024      ACCESSION NUMBER(S):  [id number]      ORDERING CLINICIAN:  VANIA MONROY      FINDINGS:          CARDIOMEDIASTINAL SILHOUETTE:  Cardiomediastinal silhouette is unremarkable in size and  configuration.      LUNGS:  Bibasilar airspace opacities. No pneumothorax or effusion.      ABDOMEN:  No remarkable upper abdominal findings.      BONES:  No acute osseous changes.       Impression: 1.  Bibasilar airspace opacities.          Signed by: Marcus Mendes 1/20/2025 11:06 AM  Dictation workstation:   [id number]      Physical Exam  Constitutional: Pleasant elderly female, alert active, cooperative not in acute distress  Eyes: PERRLA, clear sclera  ENMT: Moist mucosal membranes, no exudate  Head / Neck: Atraumatic, normocephalic, supple neck, JVP not visualized  Lungs: Patent airways, CTABL  Heart: RRR, S1S2, no murmurs appreciated, palpable pulses in all extremities  GI: Soft, NT, ND, bowel sounds present in all quadrants  MSK: Moves all extremities freely, no restriction  of ROM, no joint edema  Extremities: Intact x 4, no peripheral edema  : No Caldwell catheter inserted  Breast: Deferred  Neurological: AAO x 3 to person, place and date, facial muscles symmetrical, sensation intact, strength 4/4, no acute focal neurological deficits appreciated  Psychological: Appropriate mood and behavior    Relevant Results             Scheduled medications  apixaban, 5 mg, oral, BID  aspirin, 81 mg, oral, Daily  atorvastatin, 80 mg, oral, Nightly  azithromycin, 250 mg, oral, q24h GARRY  cefTRIAXone, 2 g, intravenous, q24h  escitalopram, 10 mg, oral, Daily  [START ON 1/22/2025] furosemide, 40 mg, oral, Daily  insulin lispro, 0-5 Units, subcutaneous, TID AC  isosorbide mononitrate ER, 60 mg, oral, Daily  metoprolol succinate XL, 50 mg, oral, Nightly  valsartan 160 mg, hydroCHLOROthiazide 12.5 mg for Diovan HCT, , oral, Daily      Continuous medications     PRN medications  PRN medications: acetaminophen **OR** acetaminophen **OR** acetaminophen, dextrose, dextrose, glucagon, glucagon, nitroglycerin, ondansetron ODT **OR** ondansetron, oxygen, sennosides    Assessment/Plan     74 y.o. female presenting with shortness of breath, COVID-positive, elevated troponins, questionable bilateral pneumonia, CHF exacerbation.  Patient has a history of hypertension, A-fib, CKD, systolic heart failure, CVA, type 2  diabetes etc.  States she has been short of breath with a productive cough for the last 10 days.       Shortness of breath: Possibly presented with respiratory failure with hypoxia, no prior home O2 use, patient necessitated O2 supplement, with SpO2 at 97%.  Multiple etiology, COVID-19 infection and pneumonia  -Continue O2 support with goal to wean off to baseline  -Bronchodilators as needed    SARS-CoV-2 infection with hypoxia  -On room air on hospital day 2  -Continue conservative approach, no dexamethasone or remdesivir at this time    Pneumonia: Likely community-acquired  -Afebrile without leukocytosis  -Ceftriaxone 2 g IV piggyback daily  -Azithromycin Dosepak, received 500 mg x 1 on admission, continue as 250 mg daily x 4 days  -Blood cultures collected in the ED, result pending  -Negative for pneumococcal, and Legionella  -Flu A/B, RSV PCR negative  -Incentive spirometer  -Bronchodilators per RT discretion and as needed    Elevated troponin: Not ACS, or type II MI, likely troponin leak in the setting of illness induced ventricular strain (tachycardia on presentation)  -Core measures not applicable    Chronic systolic heart failure: Appears to be exacerbated given elevation in BNP  - Metoprolol XL increased to 50 mg daily  -Valsartan 160 mg daily, hydrochlorothiazide 12.5 mg daily  -Imdur 60 mg daily  -Furosemide 40 mg p.o. daily  -Atorvastatin 80 mg daily  -Cardiology consulted: Appreciate evaluation recommendation    Atrial fibrillation: Stable  -Eliquis 5 mg twice daily    Bilateral pleural effusion  -Will need evaluation by IR for rational for thoracentesis    Insulin dependent type 2 diabetes  -Home regimen shows Lantus 20 to 25 units daily at bedtime  -Lispro insulin sliding scale in place    Anxiety depression  -Escitalopram 10 mg daily    Diet  -Cardiac    VTE prophylaxis  -Eliquis 5 mg twice daily    Disposition: Presented with shortness of breath, likely in hypoxemic respiratory failure (no O2 sat on  presentation, but required O2 support), due to pneumonia, and exacerbation of chronic systolic CHF, need further management, discharge pending clinical improvement.            Alphonso Guzman, DO

## 2025-01-21 NOTE — CARE PLAN
Problem: Pain - Adult  Goal: Verbalizes/displays adequate comfort level or baseline comfort level  Outcome: Progressing   The patient's goals for the shift include      The clinical goals for the shift include pt will remain safe and stable thoughout the shift    Problem: Safety - Adult  Goal: Free from fall injury  Outcome: Progressing     Problem: Discharge Planning  Goal: Discharge to home or other facility with appropriate resources  Outcome: Progressing     Problem: Chronic Conditions and Co-morbidities  Goal: Patient's chronic conditions and co-morbidity symptoms are monitored and maintained or improved  Outcome: Progressing

## 2025-01-22 ENCOUNTER — PHARMACY VISIT (OUTPATIENT)
Dept: PHARMACY | Facility: CLINIC | Age: 75
End: 2025-01-22
Payer: MEDICARE

## 2025-01-22 VITALS
HEART RATE: 70 BPM | OXYGEN SATURATION: 96 % | SYSTOLIC BLOOD PRESSURE: 123 MMHG | DIASTOLIC BLOOD PRESSURE: 77 MMHG | HEIGHT: 63 IN | TEMPERATURE: 97 F | WEIGHT: 178.4 LBS | RESPIRATION RATE: 18 BRPM | BODY MASS INDEX: 31.61 KG/M2

## 2025-01-22 LAB
ANION GAP SERPL CALC-SCNC: 11 MMOL/L (ref 10–20)
BUN SERPL-MCNC: 27 MG/DL (ref 6–23)
CALCIUM SERPL-MCNC: 9.1 MG/DL (ref 8.6–10.3)
CHLORIDE SERPL-SCNC: 104 MMOL/L (ref 98–107)
CO2 SERPL-SCNC: 30 MMOL/L (ref 21–32)
CREAT SERPL-MCNC: 1.15 MG/DL (ref 0.5–1.05)
EGFRCR SERPLBLD CKD-EPI 2021: 49 ML/MIN/1.73M*2
ERYTHROCYTE [DISTWIDTH] IN BLOOD BY AUTOMATED COUNT: 15.9 % (ref 11.5–14.5)
GLUCOSE BLD MANUAL STRIP-MCNC: 119 MG/DL (ref 74–99)
GLUCOSE BLD MANUAL STRIP-MCNC: 184 MG/DL (ref 74–99)
GLUCOSE SERPL-MCNC: 128 MG/DL (ref 74–99)
HCT VFR BLD AUTO: 32.8 % (ref 36–46)
HGB BLD-MCNC: 10.7 G/DL (ref 12–16)
MCH RBC QN AUTO: 28 PG (ref 26–34)
MCHC RBC AUTO-ENTMCNC: 32.6 G/DL (ref 32–36)
MCV RBC AUTO: 86 FL (ref 80–100)
NRBC BLD-RTO: 0 /100 WBCS (ref 0–0)
PLATELET # BLD AUTO: 270 X10*3/UL (ref 150–450)
POTASSIUM SERPL-SCNC: 3.8 MMOL/L (ref 3.5–5.3)
RBC # BLD AUTO: 3.82 X10*6/UL (ref 4–5.2)
SODIUM SERPL-SCNC: 141 MMOL/L (ref 136–145)
WBC # BLD AUTO: 5.4 X10*3/UL (ref 4.4–11.3)

## 2025-01-22 PROCEDURE — 2500000002 HC RX 250 W HCPCS SELF ADMINISTERED DRUGS (ALT 637 FOR MEDICARE OP, ALT 636 FOR OP/ED): Performed by: PHYSICIAN ASSISTANT

## 2025-01-22 PROCEDURE — RXMED WILLOW AMBULATORY MEDICATION CHARGE

## 2025-01-22 PROCEDURE — 85027 COMPLETE CBC AUTOMATED: CPT | Performed by: PHYSICIAN ASSISTANT

## 2025-01-22 PROCEDURE — 2500000001 HC RX 250 WO HCPCS SELF ADMINISTERED DRUGS (ALT 637 FOR MEDICARE OP): Performed by: PHYSICIAN ASSISTANT

## 2025-01-22 PROCEDURE — 99239 HOSP IP/OBS DSCHRG MGMT >30: CPT | Performed by: INTERNAL MEDICINE

## 2025-01-22 PROCEDURE — 2500000004 HC RX 250 GENERAL PHARMACY W/ HCPCS (ALT 636 FOR OP/ED): Performed by: INTERNAL MEDICINE

## 2025-01-22 PROCEDURE — 36415 COLL VENOUS BLD VENIPUNCTURE: CPT | Performed by: INTERNAL MEDICINE

## 2025-01-22 PROCEDURE — 2500000001 HC RX 250 WO HCPCS SELF ADMINISTERED DRUGS (ALT 637 FOR MEDICARE OP)

## 2025-01-22 PROCEDURE — 99232 SBSQ HOSP IP/OBS MODERATE 35: CPT | Performed by: NURSE PRACTITIONER

## 2025-01-22 PROCEDURE — 82374 ASSAY BLOOD CARBON DIOXIDE: CPT | Performed by: INTERNAL MEDICINE

## 2025-01-22 PROCEDURE — 82947 ASSAY GLUCOSE BLOOD QUANT: CPT

## 2025-01-22 RX ORDER — AMOXICILLIN AND CLAVULANATE POTASSIUM 875; 125 MG/1; MG/1
1 TABLET, FILM COATED ORAL 2 TIMES DAILY
Qty: 10 TABLET | Refills: 0 | Status: SHIPPED | OUTPATIENT
Start: 2025-01-22 | End: 2025-01-27

## 2025-01-22 RX ADMIN — VALSARTAN: 160 TABLET, FILM COATED ORAL at 08:47

## 2025-01-22 RX ADMIN — FUROSEMIDE 40 MG: 40 TABLET ORAL at 08:46

## 2025-01-22 RX ADMIN — APIXABAN 5 MG: 5 TABLET, FILM COATED ORAL at 08:47

## 2025-01-22 RX ADMIN — ESCITALOPRAM OXALATE 10 MG: 10 TABLET ORAL at 08:47

## 2025-01-22 RX ADMIN — INSULIN LISPRO 1 UNITS: 100 INJECTION, SOLUTION INTRAVENOUS; SUBCUTANEOUS at 12:03

## 2025-01-22 RX ADMIN — ASPIRIN 81 MG: 81 TABLET, COATED ORAL at 08:46

## 2025-01-22 RX ADMIN — ISOSORBIDE MONONITRATE 60 MG: 30 TABLET, EXTENDED RELEASE ORAL at 08:47

## 2025-01-22 RX ADMIN — CEFTRIAXONE 2 G: 2 INJECTION, POWDER, FOR SOLUTION INTRAMUSCULAR; INTRAVENOUS at 11:04

## 2025-01-22 ASSESSMENT — COGNITIVE AND FUNCTIONAL STATUS - GENERAL
MOBILITY SCORE: 24
DAILY ACTIVITIY SCORE: 24

## 2025-01-22 ASSESSMENT — PAIN SCALES - GENERAL: PAINLEVEL_OUTOF10: 0 - NO PAIN

## 2025-01-22 NOTE — CARE PLAN
The patient's goals for the shift include      The clinical goals for the shift include Pt to remain safe    Over the shift, the patient did make progress toward the following goals.   Problem: Pain - Adult  Goal: Verbalizes/displays adequate comfort level or baseline comfort level  Outcome: Progressing     Problem: Safety - Adult  Goal: Free from fall injury  Outcome: Progressing     Problem: Chronic Conditions and Co-morbidities  Goal: Patient's chronic conditions and co-morbidity symptoms are monitored and maintained or improved  Outcome: Progressing     Problem: Nutrition  Goal: Lab values WNL  Outcome: Progressing     Problem: Nutrition  Goal: Electrolytes WNL  Outcome: Progressing

## 2025-01-22 NOTE — NURSING NOTE

## 2025-01-22 NOTE — CARE PLAN
The patient's goals for the shift include      The clinical goals for the shift include pt will remain safe without a fall throughout the shift      Problem: Pain - Adult  Goal: Verbalizes/displays adequate comfort level or baseline comfort level  Outcome: Progressing     Problem: Safety - Adult  Goal: Free from fall injury  Outcome: Progressing     Problem: Discharge Planning  Goal: Discharge to home or other facility with appropriate resources  Outcome: Progressing     Problem: Chronic Conditions and Co-morbidities  Goal: Patient's chronic conditions and co-morbidity symptoms are monitored and maintained or improved  Outcome: Progressing     Problem: Nutrition  Goal: Oral intake greater 75%  Outcome: Progressing  Goal: Adequate PO fluid intake  Outcome: Progressing  Goal: BG  mg/dL  Outcome: Progressing  Goal: Lab values WNL  Outcome: Progressing  Goal: Electrolytes WNL  Outcome: Progressing  Goal: Maintain stable weight  Outcome: Progressing

## 2025-01-22 NOTE — PROGRESS NOTES
01/22/25 0756   Discharge Planning   Expected Discharge Disposition Home     Once med ready plan is to discharge home, patient on PO zithro and IV Rocephin, will need cardio clearance for discharge, I will continue to monitor for home going needs.

## 2025-01-22 NOTE — DISCHARGE SUMMARY
Discharge Diagnosis  Shortness of breath, COVID-19 infection, pneumonia in both lungs    Issues Requiring Follow-Up      Discharge Meds     Medication List      START taking these medications     amoxicillin-pot clavulanate 875-125 mg tablet; Commonly known as:   Augmentin; Take 1 tablet by mouth 2 times a day for 5 days.     CHANGE how you take these medications     furosemide 20 mg tablet; Commonly known as: Lasix; TAKE 2 TABLETS(40 MG)   BY MOUTH DAILY; What changed: See the new instructions.   Lantus Solostar U-100 Insulin 100 unit/mL (3 mL) pen; Generic drug:   insulin glargine; Inject 20-25 Units under the skin once daily at bedtime.   Take as directed per insulin instructions.; What changed: Another   medication with the same name was removed. Continue taking this   medication, and follow the directions you see here.   semaglutide 1 mg/dose (4 mg/3 mL) pen injector; Commonly known as:   OZEMPIC; Inject 1 mg under the skin 1 (one) time per week.; What changed:   Another medication with the same name was removed. Continue taking this   medication, and follow the directions you see here.     CONTINUE taking these medications     Accu-Chek Tiffanie Plus test strp strip; Generic drug: blood sugar   diagnostic   apixaban 5 mg tablet; Commonly known as: Eliquis; Take 1 tablet (5 mg)   by mouth 2 times a day.   aspirin 81 mg EC tablet   atorvastatin 80 mg tablet; Commonly known as: Lipitor; Take 1 tablet (80   mg) by mouth once daily at bedtime.   Dexcom G7 Sensor device; Generic drug: blood-glucose sensor; 1 kit once   daily.   escitalopram 10 mg tablet; Commonly known as: Lexapro; Take 1 tablet (10   mg) by mouth once daily.   incontinence pad, liner, disp pad; 1 each 2 times a day.   isosorbide mononitrate ER 60 mg 24 hr tablet; Commonly known as: Imdur;   Take 1 tablet (60 mg) by mouth once daily. Do not crush or chew.   metoprolol succinate XL 25 mg 24 hr tablet; Commonly known as:   Toprol-XL; Take 0.5 tablets  "(12.5 mg) by mouth once daily at bedtime.   nitroglycerin 0.4 mg SL tablet; Commonly known as: Nitrostat   pen needle, diabetic 32 gauge x 5/32\" needle; Commonly known as: BD   Ultra-Fine Sameera Pen Needle; USE AS DIRECTED TO TEST THREE TIMES A DAY   valsartan-hydrochlorothiazide 160-25 mg tablet; Commonly known as:   Diovan-HCT; Take 1 tablet by mouth once daily.     STOP taking these medications     ALPRAZolam 1 mg tablet; Commonly known as: Xanax   potassium chloride CR 10 mEq ER tablet; Commonly known as: Klor-Con       Test Results Pending At Discharge  Pending Labs       Order Current Status    Blood Culture Preliminary result    Blood Culture Preliminary result            Hospital Course   Keesha Franklin is a 74 y.o. female presenting with shortness of breath, COVID-positive, elevated troponins, questionable bilateral pneumonia, CHF exacerbation.  Patient has a history of hypertension, A-fib, CKD, systolic heart failure, CVA, type 2 diabetes etc.  States she has been short of breath with a productive cough for the last 10 days.  The shortness of breath was worse with walking and with laying down but is there in general even at rest.  She denies any chest pain.  She has had increased leg swelling.  No other symptoms.  Patient is on Eliquis for the A-fib and Lasix for heart failure and edema but has not taken any of her medications for the 10 days that she has been ill.  Patient is also diabetic but discontinued the Lantus insulin over a month ago.  She is on Ozempic and Jardiance and glipizide.  Blood sugars have been very well-controlled and her primary agreed with holding the Lantus insulin for the past month or more.      Medications: Xanax, Ozempic once a week, Eliquis, Diovan/hydrochlorothiazide, aspirin, atorvastatin, escitalopram, Lasix 40 mg a day, gabapentin prescribed 3 times daily but the patient only takes it daily, glipizide, Imdur, Toprol-XL, Klor-Con, Jardiance, nitro as needed.  Patient states " she is on Jardiance but it is not seen in any of the medication list.  Again patient has not been taking any of her medicine for 10 days and has not been on insulin for over a month.     ED course:.  CMP is within normal limits other than glucose of 239, creatinine is 1.16 which is consistent or improved from previous studies, GFR is 49, ALT slightly elevated at 53, AST is 45.  The rest of the CMP is within normal limits  BNP is elevated at 2774  Initial troponin was 29, second troponin is 36, third troponin is pending  CBC shows leukopenia with a white count of 4.0, mild anemia, platelets are normal.  No left shift.  Patient was positive for COVID but negative for influenza or RSV  Chest x-ray shows bibasilar airspace opacities  CT chest is pending to rule out pneumonia  EKG showed sinus tach with PVCs and a heart rate of 119 and an incomplete right bundle branch block.  Similar to previous except for the rate.  Patient was treated with 40 of Lasix IV in the emergency department and admitted to medicine.    Patient was admitted for further management, community-acquired pneumonia protocol was in place, started on azithromycin Dosepak, with addition of ceftriaxone 2 g IV piggyback daily.  On hospital day 2, patient was peridium on room air, no O2 demand, as a result was not started on steroids or remdesivir.  Patient continued to improve her regimen as above, with no change in respiratory status, maintain saturation above 94 on room air throughout hospital course.  A.m. labs to monitored with kidney function at baseline, and no significant change with hemoglobin level.  Patient vitals remained stable, and did not develop any leukocytosis.  On hospital day 3, patient was found stable for discharge home on p.o. antibiotics, Augmentin 875-125 mg 1 tablet twice daily x 5 days.  Patient was informed to maintain surgical mask with close encounter with family members, but can be without mask to interact freely after 5  days.  PCP to follow-up on pleural effusion with repeat imaging, and if worsening and developing exertional dyspnea can be referred for outpatient thoracentesis with interventional radiologist.    Greater than 30 minutes dedicated to this discharge that included educating patient and coordinating care.    Pertinent Physical Exam At Time of Discharge  Physical Exam  Constitutional: Pleasant elderly female, alert active, cooperative not in acute distress  Eyes: PERRLA, clear sclera  ENMT: Moist mucosal membranes, no exudate  Head / Neck: Atraumatic, normocephalic, supple neck, JVP not visualized  Lungs: Patent airways, CTABL  Heart: RRR, S1S2, no murmurs appreciated, palpable pulses in all extremities  GI: Soft, NT, ND, bowel sounds present in all quadrants  MSK: Moves all extremities freely, no restriction  of ROM, no joint edema  Extremities: Intact x 4, no peripheral edema  : No Caldwell catheter inserted  Breast: Deferred  Neurological: AAO x 3 to person, place and date, facial muscles symmetrical, sensation intact, strength 4/4, no acute focal neurological deficits appreciated  Psychological: Appropriate mood and behavior    Outpatient Follow-Up  Future Appointments   Date Time Provider Department Center   2/17/2025  9:40 AM Gabriel Mccabe MD [id number] [facility]   2/21/2025 10:00 AM Salvador Magaña, PharmD [id number]         Alphonso Guzman DO

## 2025-01-22 NOTE — PROGRESS NOTES
"Subjective Data:  HR better controlled SR per tele -90s  On room air          Objective Data:  Last Recorded Vitals:  Vitals:    01/21/25 1815 01/21/25 2049 01/22/25 0030 01/22/25 0749   BP:  107/74 109/76 114/81   BP Location:  Left arm Left arm Left arm   Patient Position:  Lying Lying Lying   Pulse:  100 98 75   Resp:  16 20 20   Temp:  36.6 °C (97.9 °F) 36.4 °C (97.5 °F) 36.6 °C (97.9 °F)   TempSrc:  Oral Oral Temporal   SpO2:  96% 100% 94%   Weight: 80.9 kg (178 lb 6.4 oz)      Height: 1.6 m (5' 2.99\")          Last Labs:  CBC - 1/22/2025:  5:47 AM  5.4 10.7 270    32.8      CMP - 1/22/2025:  5:48 AM  9.1 6.8 45 --- 0.9   _ 3.7 53 89      PTT - No results in last year.  _   _ _     TROPHS   Date/Time Value Ref Range Status   01/20/2025 03:30 PM 46 0 - 13 ng/L Final   01/20/2025 11:53 AM 36 0 - 13 ng/L Final   01/20/2025 10:09 AM 29 0 - 13 ng/L Final     BNP   Date/Time Value Ref Range Status   01/20/2025 10:09 AM 2,774 0 - 99 pg/mL Final   01/29/2024 01:49  0 - 99 pg/mL Final     HGBA1C   Date/Time Value Ref Range Status   12/13/2024 09:57 AM 5.8 See comment % Final   07/19/2024 11:10 AM 8.2 see below % Final   05/28/2024 11:18 AM 13.2 4.2 - 6.5 % Final   12/05/2023 11:32 AM 6.2 4.2 - 6.5 % Final     LDLCALC   Date/Time Value Ref Range Status   02/23/2024 02:10 PM 76 <=99 mg/dL Final     Comment:                                 Near   Borderline      AGE      Desirable  Optimal    High     High     Very High     0-19 Y     0 - 109     ---    110-129   >/= 130     ----    20-24 Y     0 - 119     ---    120-159   >/= 160     ----      >24 Y     0 -  99   100-129  130-159   160-189     >/=190       VLDL   Date/Time Value Ref Range Status   02/23/2024 02:10 PM 27 0 - 40 mg/dL Final   12/07/2022 07:40 AM 18 0 - 40 mg/dL Final   03/12/2021 10:24 AM 20 0 - 40 mg/dL Final   01/14/2019 04:52 AM 13 0 - 40 mg/dL Final      Last I/O:  I/O last 3 completed shifts:  In: 1095 (13.5 mL/kg) [P.O.:840; IV " Piggyback:255]  Out: 1000 (12.4 mL/kg) [Urine:1000 (0.3 mL/kg/hr)]  Weight: 80.9 kg     Past Cardiology Tests (Last 3 Years):  EKG:  ECG 12 lead 01/20/2025 (Preliminary)      ECG 12 Lead 09/17/2024      ECG 12 lead (Clinic Performed) 07/12/2024      ECG 12 Lead       ECG 12 Lead       ECG 12 lead (Clinic Performed) 01/29/2024    Echo:  Transthoracic echo (TTE) complete 02/07/2024    Ejection Fractions:  EF   Date/Time Value Ref Range Status   02/07/2024 01:58 PM 31 %      Cath:  No results found for this or any previous visit from the past 1095 days.    Stress Test:  Nuclear Stress Test 04/15/2024    Cardiac Imaging:  MR cardiac morphology and function w and wo IV contrast 10/15/2024      Inpatient Medications:  Scheduled medications   Medication Dose Route Frequency    apixaban  5 mg oral BID    aspirin  81 mg oral Daily    atorvastatin  80 mg oral Nightly    azithromycin  250 mg oral q24h GARRY    cefTRIAXone  2 g intravenous q24h    escitalopram  10 mg oral Daily    furosemide  40 mg oral Daily    insulin lispro  0-5 Units subcutaneous TID AC    isosorbide mononitrate ER  60 mg oral Daily    metoprolol succinate XL  50 mg oral Nightly    valsartan 160 mg, hydroCHLOROthiazide 12.5 mg for Diovan HCT   oral Daily     PRN medications   Medication    acetaminophen    Or    acetaminophen    Or    acetaminophen    dextrose    dextrose    glucagon    glucagon    nitroglycerin    ondansetron ODT    Or    ondansetron    oxygen    sennosides     Continuous Medications   Medication Dose Last Rate       Physical Exam:  General:  Patient is awake, alert, and oriented.  Patient is in no acute distress.  HEENT:  Pupils equal and reactive.  Normocephalic.  Moist mucosa.    Neck:  No thyromegaly.  Normal Jugular Venous Pressure.  Cardiovascular:  Regular rate and rhythm.  Normal S1 and S2.  Pulmonary:  Clear to auscultation bilaterally.  Abdomen:  Soft. Non-tender.   Non-distended.  Positive bowel sounds.  Lower Extremities:  2+  "pedal pulses. No LE edema.  Neurologic:  Cranial nerves intact.  No focal deficit.   Skin: Skin warm and dry, normal skin turgor.   Psychiatric: Normal affect.     Assessment/Plan   Cards: Nam 1/2024  EP : Lenin      Keesha Franklin is a 74 y.o. year old female patient with h/o  CAD s/p D2 POBA 1/19 (LAD ), s/p MI 1/19, HFrEF/CM, parox AF/FL, HTN, HLD, DM, CVA 9/17 (no resid), breast Ca s/p mast now w/ ARANDA/edema c/w decompensation presents to the ER with shortness of breath.  Cardiology is now consulted for \"SOB, CHF exacerbation, elevated troponins, COVID +, BNP 2774 with orthopnea and increased SOB on exertion\"     #SOB 2/2 COVID PN-chest xray bibasiliar infiltrates   #Chronic HFrEF/CM-admit BNP 2774, minimal congestion on CT scan, warm on exam  #Parox AF/FL-on Xarelto   #Elevated Troponin 2/2 Covid-Non-MI troponin elevation / acute non-traumatic myocardial injury. Core measures do not apply.   #HTN   #HLD     RECS:  -c/w metoprolol succinate to 50mg daily  -c/w dose of Lasix to 40mg a day -> to discharge on this   -No other changes on therapy.  -Can follow up with outpatient cardiologist  -Cards will sign off-reach out any questions  -Daily standing weights, 2gm sodium diet, 2L fluid restriction, strict I&Os keep k >4, Mag>2, replace as needed     Code Status:  Full Code    Gloria Montaño, SYL-CNP  "

## 2025-01-23 ENCOUNTER — PATIENT OUTREACH (OUTPATIENT)
Dept: PRIMARY CARE | Facility: CLINIC | Age: 75
End: 2025-01-23
Payer: COMMERCIAL

## 2025-01-24 ENCOUNTER — PATIENT OUTREACH (OUTPATIENT)
Dept: PRIMARY CARE | Facility: CLINIC | Age: 75
End: 2025-01-24
Payer: COMMERCIAL

## 2025-01-24 NOTE — PROGRESS NOTES
Discharge Facility: [facility]  Discharge Diagnosis: Shortness of breath, COVID-19 infection, pneumonia in both lungs   Admission Date: 20 Jan 25  Discharge Date: 22 Jan 25    PCP Appointment Date: Tasked to office  Specialist Appointment Date: None  Hospital Encounter and Summary Linked: Yes    No contact on discharge outreach after 2 attempts. Tasked to office for scheduling. Will attempt outreach again in 2 wks.

## 2025-01-25 LAB
BACTERIA BLD CULT: NORMAL
BACTERIA BLD CULT: NORMAL

## 2025-01-30 DIAGNOSIS — I63.40 CEREBROVASCULAR ACCIDENT (CVA) DUE TO EMBOLISM OF CEREBRAL ARTERY (MULTI): Primary | ICD-10-CM

## 2025-01-30 DIAGNOSIS — I50.22 CHRONIC SYSTOLIC HEART FAILURE: ICD-10-CM

## 2025-02-10 ENCOUNTER — PATIENT OUTREACH (OUTPATIENT)
Dept: PRIMARY CARE | Facility: CLINIC | Age: 75
End: 2025-02-10
Payer: MEDICARE

## 2025-02-11 LAB
ATRIAL RATE: 119 BPM
PR INTERVAL: 208 MS
Q ONSET: 209 MS
QRS COUNT: 20 BEATS
QRS DURATION: 106 MS
QT INTERVAL: 328 MS
QTC CALCULATION(BAZETT): 461 MS
QTC FREDERICIA: 412 MS
R AXIS: -69 DEGREES
T AXIS: 95 DEGREES
T OFFSET: 373 MS
VENTRICULAR RATE: 119 BPM

## 2025-02-17 ENCOUNTER — APPOINTMENT (OUTPATIENT)
Dept: PRIMARY CARE | Facility: CLINIC | Age: 75
End: 2025-02-17
Payer: COMMERCIAL

## 2025-02-20 ENCOUNTER — PATIENT OUTREACH (OUTPATIENT)
Dept: PRIMARY CARE | Facility: CLINIC | Age: 75
End: 2025-02-20
Payer: MEDICARE

## 2025-02-21 ENCOUNTER — APPOINTMENT (OUTPATIENT)
Dept: PHARMACY | Facility: HOSPITAL | Age: 75
End: 2025-02-21
Payer: MEDICARE

## 2025-03-05 ENCOUNTER — APPOINTMENT (OUTPATIENT)
Dept: RADIOLOGY | Facility: HOSPITAL | Age: 75
End: 2025-03-05
Payer: MEDICARE

## 2025-03-05 ENCOUNTER — APPOINTMENT (OUTPATIENT)
Dept: CARDIOLOGY | Facility: HOSPITAL | Age: 75
End: 2025-03-05
Payer: MEDICARE

## 2025-03-05 ENCOUNTER — HOSPITAL ENCOUNTER (EMERGENCY)
Facility: HOSPITAL | Age: 75
Discharge: HOME | End: 2025-03-05
Attending: INTERNAL MEDICINE
Payer: MEDICARE

## 2025-03-05 VITALS
HEIGHT: 64 IN | HEART RATE: 85 BPM | BODY MASS INDEX: 29.02 KG/M2 | DIASTOLIC BLOOD PRESSURE: 98 MMHG | OXYGEN SATURATION: 96 % | TEMPERATURE: 98.5 F | WEIGHT: 170 LBS | SYSTOLIC BLOOD PRESSURE: 115 MMHG | RESPIRATION RATE: 19 BRPM

## 2025-03-05 DIAGNOSIS — I50.9 ACUTE ON CHRONIC CONGESTIVE HEART FAILURE, UNSPECIFIED HEART FAILURE TYPE: Primary | ICD-10-CM

## 2025-03-05 DIAGNOSIS — J18.9 ATYPICAL PNEUMONIA: ICD-10-CM

## 2025-03-05 LAB
ALBUMIN SERPL BCP-MCNC: 3.9 G/DL (ref 3.4–5)
ALP SERPL-CCNC: 78 U/L (ref 33–136)
ALT SERPL W P-5'-P-CCNC: 35 U/L (ref 7–45)
ANION GAP SERPL CALC-SCNC: 11 MMOL/L (ref 10–20)
AST SERPL W P-5'-P-CCNC: 29 U/L (ref 9–39)
ATRIAL RATE: 84 BPM
BASOPHILS # BLD AUTO: 0.03 X10*3/UL (ref 0–0.1)
BASOPHILS NFR BLD AUTO: 0.8 %
BILIRUB SERPL-MCNC: 0.7 MG/DL (ref 0–1.2)
BNP SERPL-MCNC: 2227 PG/ML (ref 0–99)
BUN SERPL-MCNC: 22 MG/DL (ref 6–23)
CALCIUM SERPL-MCNC: 9.4 MG/DL (ref 8.6–10.3)
CARDIAC TROPONIN I PNL SERPL HS: 26 NG/L (ref 0–13)
CARDIAC TROPONIN I PNL SERPL HS: 26 NG/L (ref 0–13)
CHLORIDE SERPL-SCNC: 108 MMOL/L (ref 98–107)
CO2 SERPL-SCNC: 26 MMOL/L (ref 21–32)
CREAT SERPL-MCNC: 1.1 MG/DL (ref 0.5–1.05)
EGFRCR SERPLBLD CKD-EPI 2021: 52 ML/MIN/1.73M*2
EOSINOPHIL # BLD AUTO: 0.05 X10*3/UL (ref 0–0.4)
EOSINOPHIL NFR BLD AUTO: 1.3 %
ERYTHROCYTE [DISTWIDTH] IN BLOOD BY AUTOMATED COUNT: 16.2 % (ref 11.5–14.5)
FLUAV RNA RESP QL NAA+PROBE: NOT DETECTED
FLUBV RNA RESP QL NAA+PROBE: NOT DETECTED
GLUCOSE SERPL-MCNC: 133 MG/DL (ref 74–99)
HCT VFR BLD AUTO: 39.3 % (ref 36–46)
HGB BLD-MCNC: 12.4 G/DL (ref 12–16)
IMM GRANULOCYTES # BLD AUTO: 0.01 X10*3/UL (ref 0–0.5)
IMM GRANULOCYTES NFR BLD AUTO: 0.3 % (ref 0–0.9)
LYMPHOCYTES # BLD AUTO: 1.13 X10*3/UL (ref 0.8–3)
LYMPHOCYTES NFR BLD AUTO: 28.3 %
MCH RBC QN AUTO: 28.3 PG (ref 26–34)
MCHC RBC AUTO-ENTMCNC: 31.6 G/DL (ref 32–36)
MCV RBC AUTO: 90 FL (ref 80–100)
MONOCYTES # BLD AUTO: 0.43 X10*3/UL (ref 0.05–0.8)
MONOCYTES NFR BLD AUTO: 10.8 %
NEUTROPHILS # BLD AUTO: 2.34 X10*3/UL (ref 1.6–5.5)
NEUTROPHILS NFR BLD AUTO: 58.5 %
NRBC BLD-RTO: 0 /100 WBCS (ref 0–0)
P AXIS: 31 DEGREES
P OFFSET: 123 MS
P ONSET: 89 MS
PLATELET # BLD AUTO: 163 X10*3/UL (ref 150–450)
POTASSIUM SERPL-SCNC: 3.6 MMOL/L (ref 3.5–5.3)
PR INTERVAL: 220 MS
PROT SERPL-MCNC: 6.9 G/DL (ref 6.4–8.2)
Q ONSET: 199 MS
QRS COUNT: 14 BEATS
QRS DURATION: 118 MS
QT INTERVAL: 362 MS
QTC CALCULATION(BAZETT): 427 MS
QTC FREDERICIA: 404 MS
R AXIS: -57 DEGREES
RBC # BLD AUTO: 4.38 X10*6/UL (ref 4–5.2)
SARS-COV-2 RNA RESP QL NAA+PROBE: NOT DETECTED
SODIUM SERPL-SCNC: 141 MMOL/L (ref 136–145)
T AXIS: 87 DEGREES
T OFFSET: 380 MS
VENTRICULAR RATE: 84 BPM
WBC # BLD AUTO: 4 X10*3/UL (ref 4.4–11.3)

## 2025-03-05 PROCEDURE — 87636 SARSCOV2 & INF A&B AMP PRB: CPT | Performed by: NURSE PRACTITIONER

## 2025-03-05 PROCEDURE — 71046 X-RAY EXAM CHEST 2 VIEWS: CPT

## 2025-03-05 PROCEDURE — 83880 ASSAY OF NATRIURETIC PEPTIDE: CPT | Performed by: NURSE PRACTITIONER

## 2025-03-05 PROCEDURE — 96374 THER/PROPH/DIAG INJ IV PUSH: CPT

## 2025-03-05 PROCEDURE — 2500000004 HC RX 250 GENERAL PHARMACY W/ HCPCS (ALT 636 FOR OP/ED): Performed by: INTERNAL MEDICINE

## 2025-03-05 PROCEDURE — 80053 COMPREHEN METABOLIC PANEL: CPT | Performed by: NURSE PRACTITIONER

## 2025-03-05 PROCEDURE — 71046 X-RAY EXAM CHEST 2 VIEWS: CPT | Performed by: RADIOLOGY

## 2025-03-05 PROCEDURE — 85025 COMPLETE CBC W/AUTO DIFF WBC: CPT | Performed by: NURSE PRACTITIONER

## 2025-03-05 PROCEDURE — 84484 ASSAY OF TROPONIN QUANT: CPT | Performed by: NURSE PRACTITIONER

## 2025-03-05 PROCEDURE — 36415 COLL VENOUS BLD VENIPUNCTURE: CPT | Performed by: NURSE PRACTITIONER

## 2025-03-05 PROCEDURE — 93005 ELECTROCARDIOGRAM TRACING: CPT

## 2025-03-05 PROCEDURE — 99285 EMERGENCY DEPT VISIT HI MDM: CPT | Mod: 25 | Performed by: INTERNAL MEDICINE

## 2025-03-05 RX ORDER — AZITHROMYCIN 250 MG/1
TABLET, FILM COATED ORAL
Qty: 6 TABLET | Refills: 0 | Status: SHIPPED | OUTPATIENT
Start: 2025-03-05 | End: 2025-03-10

## 2025-03-05 RX ORDER — FUROSEMIDE 10 MG/ML
40 INJECTION INTRAMUSCULAR; INTRAVENOUS ONCE
Status: COMPLETED | OUTPATIENT
Start: 2025-03-05 | End: 2025-03-05

## 2025-03-05 RX ADMIN — FUROSEMIDE 40 MG: 10 INJECTION, SOLUTION INTRAVENOUS at 20:48

## 2025-03-05 ASSESSMENT — PAIN DESCRIPTION - ORIENTATION: ORIENTATION: LEFT

## 2025-03-05 ASSESSMENT — PAIN DESCRIPTION - LOCATION: LOCATION: HEAD

## 2025-03-05 ASSESSMENT — PAIN SCALES - GENERAL
PAINLEVEL_OUTOF10: 0 - NO PAIN
PAINLEVEL_OUTOF10: 0 - NO PAIN
PAINLEVEL_OUTOF10: 3
PAINLEVEL_OUTOF10: 0 - NO PAIN

## 2025-03-05 ASSESSMENT — PAIN - FUNCTIONAL ASSESSMENT
PAIN_FUNCTIONAL_ASSESSMENT: 0-10
PAIN_FUNCTIONAL_ASSESSMENT: 0-10

## 2025-03-05 ASSESSMENT — PAIN DESCRIPTION - PAIN TYPE: TYPE: ACUTE PAIN

## 2025-03-05 ASSESSMENT — PAIN DESCRIPTION - DESCRIPTORS: DESCRIPTORS: ACHING

## 2025-03-05 ASSESSMENT — PAIN DESCRIPTION - PROGRESSION: CLINICAL_PROGRESSION: NOT CHANGED

## 2025-03-05 ASSESSMENT — PAIN DESCRIPTION - ONSET: ONSET: GRADUAL

## 2025-03-05 ASSESSMENT — PAIN DESCRIPTION - FREQUENCY: FREQUENCY: CONSTANT/CONTINUOUS

## 2025-03-05 NOTE — ED TRIAGE NOTES
Patient presents to ED from home for SOB and congestion and diaphoresis that started 1 week ago. Patient was diagnosed with COVID and pneumonia in January and she states she feels the sane way.

## 2025-03-05 NOTE — ED TRIAGE NOTES
This patient was seen in triage.     Vitals are noted.      HPI:  Patient is a 75-year-old female, past medical history of atrial fibrillation presenting to the emergency department with shortness of breath, cough that started about a week ago, was recently admitted to the hospital in January for COVID, pneumonia.  Had been on antibiotics for 5 days, states that her symptoms did improve but now seem to be getting worse, is unsure if she needs more antibiotics.  Cough is unproductive.  Denies any abdominal pain, nausea, vomiting, lightheadedness, dizziness, syncope.  Patient is on Eliquis and has been compliant with her medication    Focused PE:  Gen: Well-appearing, not in acute distress  Cardiovascular: Regular rate, normal rhythm, no murmur, no gallop  Respiratory: No adventitious lung sounds auscultated.  Abdomen: No reproducible abdominal tenderness upon palpation,  physical exam may be limited by patient positioning sitting up in a chair  Neuro:  Alert and Oriented, speech clear and coherent     Plan:  IV, lab work, EKG, imaging        For the remainder of the patient's workup and ED course, please see the main ED provider note.  We discussed need for diagnostic testing including lab studies and imaging.  We also discussed that they may be asked to wait in the waiting room while these test are pending.  They understand that if they choose to leave without having the testing completed or resulted that we cannot rule out acute life-threatening illnesses and the risks involved to lead to worsening condition, permanent disability or even death.

## 2025-03-06 NOTE — DISCHARGE INSTRUCTIONS
You were seen today for shortness of breath.  We suspect you have a CHF exacerbation.  Please call your cardiologist to follow-up tomorrow.  Increase your Lasix (furosemide) from 40 mg (2 tablets) daily to 60 mg (3 tablets) daily for the next 5 days.  We gave you prescription for antibiotics in case of superimposed pneumonia.  Your evaluation was not concerning for an emergency at this time. Please see the attached information sheet for information about your condition, how to care for your condition at home, and reasons to return to the emergency department. Take any prescriptions written today as prescribed. You should call your primary care provider within 24 hours to tell them about today's visit, including any new medications or medication changes, as he or she may want to see you in the office for further evaluation. If you do not have a primary care provider, call  [phone] for an appointment. We offer in-person office visits as well as virtual options. Please do not hesitate to call  [phone] or return to the emergency department with any new or unresolved concerns or symptoms. Thank you for choosing [facility] for your care.

## 2025-03-06 NOTE — ED PROVIDER NOTES
"HPI     CC: Shortness of Breath     HPI: Keesha Franklin is a 75 y.o. female with a history of HTN, AF on apixaban, CKD, HFrEF, CVA, T2DM, hospitalization in January for COVID-19 and CHF exacerbation, who presents with shortness of breath.  She endorses exertional dyspnea for the past week and a half.  No significant cough or fever, did cough \"twice\" today.  She feels occasionally warm and sweaty.  She never really felt like she got better after her hospitalization in January.  She endorses lower extremity edema worse than baseline.  She is compliant with her furosemide 20 mg/day.  She has not noticed any change to her urine output.  She denies chest pain or leg pain.  She is compliant with her anticoagulation.    ROS: 10-point review of systems was performed and is otherwise negative except as noted in HPI.    Limitations to history: N/A    Independent Historians: Family member at bedside    External Records Reviewed: Outpatient notes in EMR, DC summary from 1/22    Past Medical History: Noncontributory except per HPI     Past Surgical History: Noncontributory except per HPI     Family History: Reviewed and noncontributory     Social History:  Denies tobacco. Denies ETOH. Denies illicit drugs.    Social Determinants Affecting Care: N/A    Allergies   Allergen Reactions    Jardiance [Empagliflozin] Swelling     Legs become swollen and painful with shooting pains    Lisinopril Angioedema and Unknown       Home Meds:   Current Outpatient Medications   Medication Instructions    apixaban (ELIQUIS) 5 mg, oral, 2 times daily    aspirin 81 mg EC tablet 1 tablet, Daily    atorvastatin (LIPITOR) 80 mg, oral, Nightly    azithromycin (Zithromax) 250 mg tablet Take 2 tablets (500 mg) by mouth once daily for 1 day, THEN 1 tablet (250 mg) once daily for 4 days. Take 2 tabs (500 mg) by mouth today, then take 1 tab daily for 4 days..    blood sugar diagnostic (Accu-Chek Tiffanie Plus test strp) strip USE TO TEST THREE TIMES A DAY    " "blood-glucose sensor (Dexcom G7 Sensor) device 1 kit, miscellaneous, Daily    escitalopram (LEXAPRO) 10 mg, oral, Daily    furosemide (Lasix) 20 mg tablet TAKE 2 TABLETS(40 MG) BY MOUTH DAILY    incontinence pad, liner, disp pad 1 each, miscellaneous, 2 times daily    isosorbide mononitrate ER (IMDUR) 60 mg, oral, Daily, Do not crush or chew.    Lantus Solostar U-100 Insulin 20-25 Units, subcutaneous, Nightly, Take as directed per insulin instructions.    metoprolol succinate XL (TOPROL-XL) 12.5 mg, oral, Nightly    nitroglycerin (NITROSTAT) 0.4 mg, sublingual, Every 5 min PRN, Up to 3 times.    pen needle, diabetic ([employer] Ultra-Fine Sameera Pen Needle) 32 gauge x 5/32\" needle USE AS DIRECTED TO TEST THREE TIMES A DAY    semaglutide (OZEMPIC) 1 mg, subcutaneous, Once Weekly    valsartan-hydrochlorothiazide (Diovan-HCT) 160-25 mg tablet 1 tablet, oral, Daily        Physical Exam     ED Triage Vitals [03/05/25 1608]   Temperature Heart Rate Respirations BP   37.1 °C (98.7 °F) 88 18 (!) 131/93      Pulse Ox Temp Source Heart Rate Source Patient Position   95 % Temporal Monitor Sitting      BP Location FiO2 (%)     Right arm --         Heart Rate:  []   Temperature:  [36.9 °C (98.5 °F)-37.1 °C (98.7 °F)]   Respirations:  [18-20]   BP: (114-131)/()   Height:  [162.6 cm (5' 4\")]   Weight:  [77.1 kg (170 lb)]   Pulse Ox:  [94 %-97 %]      Physical Exam  Vitals and nursing note reviewed.     CONSTITUTIONAL: Well appearing, well nourished, elderly female in no acute distress.   HENMT: Head atraumatic. Airway patent. Nasal mucosa clear. Mouth with normal mucosa, clear oropharynx. Uvula midline. Neck supple.    EYES: Clear bilaterally, pupils equally round and reactive to light.   CARDIOVASCULAR: Normal rate, regular rhythm.  Heart sounds S1, S2.  No murmurs, rubs or gallops. Normal pulses. Capillary refill < 2 sec.   RESPIRATORY: No increased work of breathing. Breath sounds clear and equal bilaterally except for faint " bibasilar crackles.  GASTROINTESTINAL: Abdomen soft, non-distended, non-tender. No rebound, no guarding. Normal bowel sounds. No palpable masses.  GENITOURINARY:  No CVA tenderness.  MUSCULOSKELETAL: Spine appears normal, range of motion is not limited, no muscle or joint tenderness.  1+ bilateral lower extremity edema.  No calf TTP or palpable cords.  No erythema.  NEUROLOGICAL: Alert and oriented, no asymmetry, moving all extremities equally.  SKIN: Warm, dry and intact. No rash or notable lesions.  PSYCHIATRIC: Normal mood and affect.  HEME/LYMPH: No adenopathy or splenomegaly.    Diagnostic Results      ECG: ECGs read and interpreted by me. See ED Course, below, for interpretation.    Labs Reviewed   CBC WITH AUTO DIFFERENTIAL - Abnormal       Result Value    WBC 4.0 (*)     nRBC 0.0      RBC 4.38      Hemoglobin 12.4      Hematocrit 39.3      MCV 90      MCH 28.3      MCHC 31.6 (*)     RDW 16.2 (*)     Platelets 163      Neutrophils % 58.5      Immature Granulocytes %, Automated 0.3      Lymphocytes % 28.3      Monocytes % 10.8      Eosinophils % 1.3      Basophils % 0.8      Neutrophils Absolute 2.34      Immature Granulocytes Absolute, Automated 0.01      Lymphocytes Absolute 1.13      Monocytes Absolute 0.43      Eosinophils Absolute 0.05      Basophils Absolute 0.03     COMPREHENSIVE METABOLIC PANEL - Abnormal    Glucose 133 (*)     Sodium 141      Potassium 3.6      Chloride 108 (*)     Bicarbonate 26      Anion Gap 11      Urea Nitrogen 22      Creatinine 1.10 (*)     eGFR 52 (*)     Calcium 9.4      Albumin 3.9      Alkaline Phosphatase 78      Total Protein 6.9      AST 29      Bilirubin, Total 0.7      ALT 35     B-TYPE NATRIURETIC PEPTIDE - Abnormal    BNP 2,227 (*)     Narrative:        <100 pg/mL - Heart failure unlikely  100-299 pg/mL - Intermediate probability of acute heart                  failure exacerbation. Correlate with clinical                  context and patient history.    >=300 pg/mL  - Heart Failure likely. Correlate with clinical                  context and patient history.    BNP testing is performed using different testing methodology at [facility] than at other [facility]. Direct result comparisons should only be made within the same method.      SERIAL TROPONIN-INITIAL - Abnormal    Troponin I, High Sensitivity 26 (*)     Narrative:     Less than 99th percentile of normal range cutoff-  Female and children under 18 years old <14 ng/L; Male <21 ng/L: Negative  Repeat testing should be performed if clinically indicated.     Female and children under 18 years old 14-50 ng/L; Male 21-50 ng/L:  Consistent with possible cardiac damage and possible increased clinical   risk. Serial measurements may help to assess extent of myocardial damage.     >50 ng/L: Consistent with cardiac damage, increased clinical risk and  myocardial infarction. Serial measurements may help assess extent of   myocardial damage.      NOTE: Children less than 1 year old may have higher baseline troponin   levels and results should be interpreted in conjunction with the overall   clinical context.     NOTE: Troponin I testing is performed using a different   testing methodology at [facility] than at other   [facility]. Direct result comparisons should only   be made within the same method.   SERIAL TROPONIN, 1 HOUR - Abnormal    Troponin I, High Sensitivity 26 (*)     Narrative:     Less than 99th percentile of normal range cutoff-  Female and children under 18 years old <14 ng/L; Male <21 ng/L: Negative  Repeat testing should be performed if clinically indicated.     Female and children under 18 years old 14-50 ng/L; Male 21-50 ng/L:  Consistent with possible cardiac damage and possible increased clinical   risk. Serial measurements may help to assess extent of myocardial damage.     >50 ng/L: Consistent with cardiac damage, increased clinical risk and  myocardial infarction. Serial  measurements may help assess extent of   myocardial damage.      NOTE: Children less than 1 year old may have higher baseline troponin   levels and results should be interpreted in conjunction with the overall   clinical context.     NOTE: Troponin I testing is performed using a different   testing methodology at [facility] than at [facility]. Direct result comparisons should only   be made within the same method.   SARS-COV-2 AND INFLUENZA A/B PCR - Normal    Flu A Result Not Detected      Flu B Result Not Detected      Coronavirus 2019, PCR Not Detected      Narrative:     This assay is an FDA-cleared, in vitro diagnostic nucleic acid amplification test for the qualitative detection and differentiation of SARS CoV-2/ Influenza A/B from nasopharyngeal specimens collected from individuals with signs and symptoms of respiratory tract infections, and has been validated for use at [facility]. Negative results do not preclude COVID-19/ Influenza A/B infections and should not be used as the sole basis for diagnosis, treatment, or other management decisions. Testing for SARS CoV-2 is recommended only for patients who meet current clinical and/or epidemiological criteria defined by federal, state, or local public health directives.   TROPONIN SERIES- (INITIAL, 1 HR)    Narrative:     The following orders were created for panel order Troponin I Series, High Sensitivity (0, 1 HR).  Procedure                               Abnormality         Status                     ---------                               -----------         ------                     Troponin I, High Sensiti...[[id number]]  Abnormal            Final result               Troponin, High Sensitivi...[[id number]]  Abnormal            Final result                 Please view results for these tests on the individual orders.         XR chest 2 views   Final Result   mild patchy ground-glass airspace opacity which  can be seen with   inflammatory and/or atypical infectious etiologies.             Signed by: Aaron Elliott 3/5/2025 6:24 PM   Dictation workstation:   [id number]                    Familia Coma Scale Score: 15                  Procedure  Procedures    ED Course & MDM   Assessment/Plan:   Keesha Franklin is a 75 y.o. female with a history of HTN, AF on apixaban, CKD, HFrEF, CVA, T2DM, hospitalization in January for COVID-19 and CHF exacerbation, who presents with shortness of breath.  She is hemodynamically stable, saturating well on room air here.  She appears mildly volume overloaded with 1+ bilateral lower extremity edema and trace crackles on exam.  She does endorse a very mild cough today, some chills/sweats, no other infectious symptoms.  Favor CHF exacerbation over new infectious process.  No chest pain, signs of DVT on exam, to suspect PE and patient is compliant with her anticoagulation.  Workup was initiated with ECG, labs, chest x-ray. See below for details of ED course and ultimate disposition.    Medications   furosemide (Lasix) injection 40 mg (40 mg intravenous Given 3/5/25 2048)        ED Course as of 03/05/25 2232   Wed Mar 05, 2025   1909 ECG read and interpreted by me.  Sinus rhythm with marked sinus arrhythmia with first-degree AV block, rate 84.  Left axis deviation.  RBBB.  No significant ST or T wave derangements. [CG]   1909 Labs are notable for CBC with leukopenia 4.0, normal H&H, normal platelets, CMP with mildly elevated creatinine 1.1 otherwise normal renal and liver function, mildly elevated troponin 26 (down from prior), markedly elevated BNP 2227 (chronic), negative viral swabs [CG]   1909 CXR mild patchy ground-glass airspace opacity which can be seen with inflammatory and/or atypical infectious etiologies.   [CG]   2231 Ambulatory pulse ox acceptable at 95%.  Patient was reevaluated.  She feels much better, states she urinated a lot.  She was advised to increase her Lasix dose  from 40 mg daily to 60 mg daily for the next 5 days.  She was also given azithromycin for possible atypical pneumonia although I suspect her presentation is more consistent with CHF.  She plans to call Dr. Turner her cardiologist tomorrow.  Patient was discharged home with anticipatory guidance and strict return precautions. [CG]      ED Course User Index  [CG] Flavia Coreas MD         Diagnoses as of 03/05/25 2232   Acute on chronic congestive heart failure, unspecified heart failure type   Atypical pneumonia       Disposition:   DISCHARGE.  The patient was discharged with both verbal and written anticipatory guidance and strict return precautions. Discharge diagnosis, instructions and plan were discussed and understood. I emphasized the importance of following up with their primary care provider within 24-48 hours and with any referrals in the timeframe recommended. At the time of discharge the patient was comfortable and was in no apparent distress. Patient is aware of diagnostic uncertainty and was notified though testing is negative here, there is a very small chance that pathology may be missed.  The patient understands these risks and the patient/family understood to call 911 or return immediately to the emergency department if the symptoms worsen or if they have any additional concerns.      FOLLOW UP  Primary care provider in 1-2 days.      ED Prescriptions       Medication Sig Dispense Start Date End Date Auth. Provider    azithromycin (Zithromax) 250 mg tablet Take 2 tablets (500 mg) by mouth once daily for 1 day, THEN 1 tablet (250 mg) once daily for 4 days. Take 2 tabs (500 mg) by mouth today, then take 1 tab daily for 4 days.. 6 tablet 3/5/2025 3/10/2025 MD Flavia Mckeon MD  EM/IM/Peds    This note was dictated by speech recognition. Minor errors in transcription may be present.     Flavia Coreas MD  03/05/25 2232

## 2025-03-13 ENCOUNTER — APPOINTMENT (OUTPATIENT)
Dept: PRIMARY CARE | Facility: CLINIC | Age: 75
End: 2025-03-13
Payer: MEDICARE

## 2025-03-13 VITALS — WEIGHT: 170 LBS | TEMPERATURE: 97.8 F | BODY MASS INDEX: 29.18 KG/M2

## 2025-03-13 DIAGNOSIS — I50.22 CHRONIC SYSTOLIC HEART FAILURE: Primary | ICD-10-CM

## 2025-03-13 DIAGNOSIS — N18.31 CHRONIC RENAL IMPAIRMENT, STAGE 3A (MULTI): ICD-10-CM

## 2025-03-13 DIAGNOSIS — E08.00 DIABETES MELLITUS DUE TO UNDERLYING CONDITION WITH HYPEROSMOLARITY WITHOUT NONKETOTIC HYPERGLYCEMIC-HYPEROSMOLAR COMA (NKHHC): ICD-10-CM

## 2025-03-13 DIAGNOSIS — I10 PRIMARY HYPERTENSION: ICD-10-CM

## 2025-03-13 PROCEDURE — 99214 OFFICE O/P EST MOD 30 MIN: CPT | Performed by: INTERNAL MEDICINE

## 2025-03-13 PROCEDURE — G2211 COMPLEX E/M VISIT ADD ON: HCPCS | Performed by: INTERNAL MEDICINE

## 2025-03-13 PROCEDURE — 1123F ACP DISCUSS/DSCN MKR DOCD: CPT | Performed by: INTERNAL MEDICINE

## 2025-03-13 ASSESSMENT — ENCOUNTER SYMPTOMS
DIZZINESS: 0
NECK PAIN: 0
FREQUENCY: 0
CONSTIPATION: 0
BLOOD IN STOOL: 0
HEADACHES: 0
DIARRHEA: 0
PALPITATIONS: 0
FEVER: 0
SORE THROAT: 0
ABDOMINAL PAIN: 0
FATIGUE: 1
SINUS PAIN: 0
BACK PAIN: 0
COUGH: 0
CHILLS: 0
MYALGIAS: 0
WEAKNESS: 1
SHORTNESS OF BREATH: 1
NERVOUS/ANXIOUS: 0
DYSURIA: 0
ARTHRALGIAS: 0
CONFUSION: 0

## 2025-03-13 NOTE — PROGRESS NOTES
Subjective   Patient ID: Keesha Franklin is a 75 y.o. female who presents for Follow-up.    HPI Ms. Franklin is a 75-year-old female seen in office today for follow-up after recent ER visit.  She went to the ER last week for worsening shortness of breath and leg swelling.  Her medical history significant for systolic heart failure, EF 30 to 40% per echo in 2024.  She has hypertension, CKD, history of CVA, diabetes, hyperlipidemia.  She had been noticing worsening swelling in her legs.  She has not had any significant changes in her diet or activity.  She was taking furosemide 20 mg daily.  In ER she had blood tests.  BNP was elevated -2774.  Chest x-ray showed bibasilar airspace opacities.   CT chest reviewed-moderate bilateral pleural effusions likely partially loculated, possible atelectasis.  She was discharged home with higher dose of furosemide.  She still experiences some shortness of breath and leg swelling.    Review of Systems   Constitutional:  Positive for fatigue. Negative for chills and fever.   HENT:  Negative for congestion, sinus pain and sore throat.    Respiratory:  Positive for shortness of breath. Negative for cough.    Cardiovascular:  Positive for leg swelling. Negative for chest pain and palpitations.   Gastrointestinal:  Negative for abdominal pain, blood in stool, constipation and diarrhea.   Genitourinary:  Negative for dysuria and frequency.   Musculoskeletal:  Negative for arthralgias, back pain, myalgias and neck pain.   Neurological:  Positive for weakness. Negative for dizziness and headaches.   Psychiatric/Behavioral:  Negative for confusion. The patient is not nervous/anxious.        Objective   Temp 36.6 °C (97.8 °F)   Wt 77.1 kg (170 lb)   BMI 29.18 kg/m²     Physical Exam  Vitals reviewed.   Constitutional:       General: She is not in acute distress.     Appearance: Normal appearance.   HENT:      Head: Normocephalic and atraumatic.   Cardiovascular:      Rate and Rhythm: Normal  rate and regular rhythm.      Pulses: Normal pulses.   Pulmonary:      Effort: Pulmonary effort is normal. No respiratory distress.      Breath sounds: Normal breath sounds.      Comments: Scattered rales bilaterally  Abdominal:      General: Bowel sounds are normal. There is no distension.      Tenderness: There is no abdominal tenderness.   Musculoskeletal:         General: Tenderness present. No swelling. Normal range of motion.      Cervical back: Normal range of motion.      Comments: Bilateral pitting ankle and leg edema   Skin:     General: Skin is warm.   Neurological:      General: No focal deficit present.      Mental Status: She is alert.      Motor: Weakness present.      Coordination: Coordination normal.      Gait: Gait abnormal.   Psychiatric:         Mood and Affect: Mood normal.         Behavior: Behavior normal.         Assessment/Plan   Diagnoses and all orders for this visit:  Chronic systolic heart failure  Comments:  Acutely decompensated systolic heart failure  Recent ER visit, BNP elevated-continue furosemide 60 mg daily  Patient needs to make appointment with cardiology  Primary hypertension  Comments:  Continue current medications  Diabetes mellitus due to underlying condition with hyperosmolarity without nonketotic hyperglycemic-hyperosmolar coma ([facility])  Comments:  Continue Ozempic 1 mg weekly  Chronic renal impairment, stage 3a (Multi)  Comments:  Renal function is stable    Discussed with patient and her partner that she needs to go to ER if she has worsening shortness of breath or leg swelling.  Follow-up in office in 2 months

## 2025-03-18 ENCOUNTER — APPOINTMENT (OUTPATIENT)
Dept: CARDIOLOGY | Facility: CLINIC | Age: 75
End: 2025-03-18
Payer: MEDICARE

## 2025-03-24 DIAGNOSIS — E08.00 DIABETES MELLITUS DUE TO UNDERLYING CONDITION WITH HYPEROSMOLARITY WITHOUT NONKETOTIC HYPERGLYCEMIC-HYPEROSMOLAR COMA (NKHHC): Primary | ICD-10-CM

## 2025-03-31 ENCOUNTER — OFFICE VISIT (OUTPATIENT)
Dept: CARDIOLOGY | Facility: CLINIC | Age: 75
End: 2025-03-31
Payer: MEDICARE

## 2025-03-31 VITALS
HEART RATE: 91 BPM | HEIGHT: 65 IN | SYSTOLIC BLOOD PRESSURE: 134 MMHG | OXYGEN SATURATION: 93 % | WEIGHT: 179 LBS | DIASTOLIC BLOOD PRESSURE: 84 MMHG | BODY MASS INDEX: 29.82 KG/M2

## 2025-03-31 DIAGNOSIS — I50.20 HFREF (HEART FAILURE WITH REDUCED EJECTION FRACTION): ICD-10-CM

## 2025-03-31 DIAGNOSIS — R06.09 DOE (DYSPNEA ON EXERTION): ICD-10-CM

## 2025-03-31 DIAGNOSIS — I48.91 ATRIAL FIBRILLATION, UNSPECIFIED TYPE (MULTI): Primary | ICD-10-CM

## 2025-03-31 DIAGNOSIS — R09.89 CAROTID BRUIT, UNSPECIFIED LATERALITY: ICD-10-CM

## 2025-03-31 PROCEDURE — 1159F MED LIST DOCD IN RCRD: CPT | Performed by: INTERNAL MEDICINE

## 2025-03-31 PROCEDURE — 99214 OFFICE O/P EST MOD 30 MIN: CPT | Mod: 25 | Performed by: INTERNAL MEDICINE

## 2025-03-31 PROCEDURE — 99214 OFFICE O/P EST MOD 30 MIN: CPT | Performed by: INTERNAL MEDICINE

## 2025-03-31 PROCEDURE — 1126F AMNT PAIN NOTED NONE PRSNT: CPT | Performed by: INTERNAL MEDICINE

## 2025-03-31 PROCEDURE — 93010 ELECTROCARDIOGRAM REPORT: CPT | Performed by: INTERNAL MEDICINE

## 2025-03-31 PROCEDURE — 3075F SYST BP GE 130 - 139MM HG: CPT | Performed by: INTERNAL MEDICINE

## 2025-03-31 PROCEDURE — 3079F DIAST BP 80-89 MM HG: CPT | Performed by: INTERNAL MEDICINE

## 2025-03-31 PROCEDURE — 93005 ELECTROCARDIOGRAM TRACING: CPT | Performed by: INTERNAL MEDICINE

## 2025-03-31 PROCEDURE — 1123F ACP DISCUSS/DSCN MKR DOCD: CPT | Performed by: INTERNAL MEDICINE

## 2025-03-31 RX ORDER — SPIRONOLACTONE 25 MG/1
25 TABLET ORAL DAILY
Qty: 90 TABLET | Refills: 0 | Status: SHIPPED | OUTPATIENT
Start: 2025-03-31 | End: 2025-04-03

## 2025-03-31 ASSESSMENT — ENCOUNTER SYMPTOMS
COUGH: 0
CONSTIPATION: 0
VOMITING: 0
FEVER: 0
MYALGIAS: 0
HEMATURIA: 0
DYSURIA: 0
FALLS: 0
HEMOPTYSIS: 0
ABDOMINAL PAIN: 0
BLOATING: 0
MEMORY LOSS: 0
NAUSEA: 0
DIARRHEA: 0
ALTERED MENTAL STATUS: 0
DEPRESSION: 0
WHEEZING: 0
HEADACHES: 0
CHILLS: 0

## 2025-03-31 ASSESSMENT — PAIN SCALES - GENERAL: PAINLEVEL_OUTOF10: 0-NO PAIN

## 2025-03-31 NOTE — PROGRESS NOTES
Chief Complaint   Patient presents with    Coronary Artery Disease    Heart Failure    Cardiomyopathy    Atrial Fibrillation        HPI  74 yo BF w/ h/o CAD s/p D2 POBA 1/19 (LAD ), s/p MI 1/19, HFrEF/CM, parox AF/FL, HTN, HLD, DM, CVA 9/17 (no resid), breast Ca s/p mast now here for cardiology FU. No chest pain. No dyspnea at rest. +ARANDA (mild-mod exertion). No orthopnea/PND. No palps. +occ brief LH on standing up. No syncope. +LE edema. No claudication. No cough. +occ fatigue. +snoring. +occ RUE cramp x seconds. +occ hearing heartbeat.   ECG 6/22: SR (74), PACs, RBBB, LAFB  ECG 9/23: SR (75), PACs, RBBB, LAFB, ?LMI  ECG 1/24: AFIB (62), RBBB, LAFB, ?ALMI  ECG 4/24: SR (77), RBBB, LAFB, ?LMI  ECG 7/24: SR (81), PACs, 1st deg AVB, iRBBB, IMI  ECG 9/24: SR (74), RBBB, LVH, ?LMI  ECG 3/25: SR (84), 1st deg AVB, RBBB, IMI, ALMI  ECG 3/25: SR (81), PACs, RBBB, IMI, ALMI  HM 11/17: SR, HR  (avg 76), SVT x29 (long 23s)   1/24: SR w/ parox AF/FL 56%, HR  (avg 62), SVT x69 (long 49s), 2nd deg AVB-I  Echo 6/21: EF 55-60%, pseudonl/DD, mod LAE, mild MR  Echo 2/24: EF 35-40%, apex HK, DD, mod LAE, smam R mass, mild-mod MR  cMRI 10/24: EF 45%, no infarct/infiltrate, lip hyper IAS, no RA mass, no MR  Stress cMRI 3/19: EF 39%, LAD scar/ischemia, nl RV  Nuc 4/24: no ischemia, large apex scar, EF 47%  Cath 1/19; mLAD 100%, pD2 100% (POBA)  Cath 6/21: pLAD 30%, p-mLAD 30%, mLAD 100% (L->L collat), o-pD2 99%, LVEDP 18-21, no AVS  CXR 1/24: no acute abnl  CXR 1/25: bibasilar opacities  CXR 3/25: mild patchy GGO  CTA chest 4/19: no PE, nl PA, mild CAC, nl heart size, no peric eff, mild AA, no aneurysm  CT chest 1/25: mod B pleur eff  MRI brain 9/17: acute infarct L front operculum  MRA head/neck 9/17: BICA sig loss (?artifact)     Review of Systems   Constitutional: Negative for chills, fever and malaise/fatigue.   HENT:  Negative for hearing loss.    Eyes:  Negative for visual disturbance.   Respiratory:  Negative for  "cough, hemoptysis and wheezing.    Skin:  Negative for rash.   Musculoskeletal:  Negative for falls and myalgias.   Gastrointestinal:  Negative for bloating, abdominal pain, constipation, diarrhea, dysphagia, nausea and vomiting.   Genitourinary:  Negative for dysuria and hematuria.   Neurological:  Negative for headaches.   Psychiatric/Behavioral:  Negative for altered mental status, depression and memory loss.       Social History     Tobacco Use    Smoking status: Never     Passive exposure: Never    Smokeless tobacco: Never   Substance Use Topics    Alcohol use: Not Currently     Alcohol/week: 1.0 standard drink of alcohol     Types: 1 Standard drinks or equivalent per week      Family History   Problem Relation Name Age of Onset    Hypertension Mother      Coronary artery disease Father      Other (cardiac disorder) Father      Hypertension Sister      COPD Sister      COPD Brother        Allergies   Allergen Reactions    Jardiance [Empagliflozin] Swelling     Legs become swollen and painful with shooting pains    Lisinopril Angioedema and Unknown      Current Outpatient Medications   Medication Instructions    apixaban (ELIQUIS) 5 mg, oral, 2 times daily    aspirin 81 mg EC tablet 1 tablet, Daily    atorvastatin (LIPITOR) 80 mg, oral, Nightly    blood sugar diagnostic (Accu-Chek Tiffanie Plus test strp) strip USE TO TEST THREE TIMES A DAY    blood-glucose sensor (Dexcom G7 Sensor) device 1 kit, miscellaneous, Daily    escitalopram (LEXAPRO) 10 mg, oral, Daily    furosemide (Lasix) 20 mg tablet TAKE 2 TABLETS(40 MG) BY MOUTH DAILY    incontinence pad, liner, disp pad 1 each, miscellaneous, 2 times daily    isosorbide mononitrate ER (IMDUR) 60 mg, oral, Daily, Do not crush or chew.    metoprolol succinate XL (TOPROL-XL) 12.5 mg, oral, Nightly    nitroglycerin (NITROSTAT) 0.4 mg, Every 5 min PRN    pen needle, diabetic (BD Ultra-Fine Sameera Pen Needle) 32 gauge x 5/32\" needle USE AS DIRECTED TO TEST THREE TIMES A DAY "    semaglutide (OZEMPIC) 1 mg, subcutaneous, Once Weekly    valsartan-hydrochlorothiazide (Diovan-HCT) 160-25 mg tablet 1 tablet, oral, Daily        Vitals:    25 0902   BP: 134/84   Pulse: 91   SpO2: 93%      Physical Exam  Constitutional:       Appearance: Normal appearance.   HENT:      Head: Normocephalic and atraumatic.      Nose: Nose normal.   Neck:      Vascular: Carotid bruit and JVD present.      Comments: Bruit suspected radiation from murmur  Cardiovascular:      Rate and Rhythm: Normal rate. Rhythm irregular. Extrasystoles are present.     Heart sounds: Murmur heard.      Systolic murmur is present with a grade of 2/6.      Friction rub present.      Comments: No Lasix taken today  Pulmonary:      Effort: Pulmonary effort is normal.      Breath sounds: Normal breath sounds.   Abdominal:      Palpations: Abdomen is soft.      Tenderness: There is no abdominal tenderness.   Musculoskeletal:      Right lower le+ Pitting Edema present.      Left lower le+ Pitting Edema present.   Skin:     General: Skin is warm and dry.   Neurological:      General: No focal deficit present.      Mental Status: She is alert.   Psychiatric:         Mood and Affect: Mood normal.         Judgment: Judgment normal.        Results/Data  3/25 Cr 1.1, K 3.6, LFT nl, HGB 12.4, , HSTPN 26, BNP 2227   Cr 1.16, K 4.1, HSTPN 46, BNP 2774   Cr 1.2, K 3.9   Cr 1.46, K 4.4, Mg 3.0, LFT nl, LDL 76, HDL 62, , Chol 165, HGB 13.9, , hgba1c 9.6, TSH 2.6   Cr 1.34, K 3.7, Mg 1.96, TSH 3.6,    Cr 1.3, K 3.7, LFT nl, hgba1c 7.2   Cr 1.21, K 3.9, LFT nl, LDL 71, HDL 56, TG 88, Chol 145, HGB 13, , hgba1c 8.8, TSH 3.44   TPN 11     Assessment/Plan   76 yo BF w/ h/o CAD s/p D2 POBA  (LAD ), s/p MI , HFrEF/CM, parox AF/FL, HTN, HLD, DM, CVA  (no resid), breast Ca s/p mast now w/ continued but improved ARANDA/edema. Add [place] 25 every day. May be getting RFA for  parox AFIB. Check limited echo (murmur/?rub) and carotid US.   -continue ASA 81 every day  -continue Eliquis 5 bid   -continue Metoprolol Succinate 25 qd  -continue Valsartan-HCTZ 160-25 every day (consider change to Entresto)  -continue Imdur 60 every day  -continue Lasix 60 every day  -add [place] 25 every day -> check Cr/K on it  -continue Atorva 80 every day   -consider retry Jardiance or Farxiga (she thinks it caused her SE)  -f/u 3-4 months (earlier if needed)     Juancarlos Browning MD

## 2025-04-01 DIAGNOSIS — E08.00 DIABETES MELLITUS DUE TO UNDERLYING CONDITION WITH HYPEROSMOLARITY WITHOUT NONKETOTIC HYPERGLYCEMIC-HYPEROSMOLAR COMA (NKHHC): ICD-10-CM

## 2025-04-02 LAB
ATRIAL RATE: 81 BPM
P AXIS: 61 DEGREES
P OFFSET: 165 MS
P ONSET: 113 MS
PR INTERVAL: 202 MS
Q ONSET: 214 MS
QRS COUNT: 14 BEATS
QRS DURATION: 122 MS
QT INTERVAL: 382 MS
QTC CALCULATION(BAZETT): 443 MS
QTC FREDERICIA: 422 MS
R AXIS: -64 DEGREES
T AXIS: 95 DEGREES
T OFFSET: 405 MS
VENTRICULAR RATE: 81 BPM

## 2025-04-03 DIAGNOSIS — I50.20 HFREF (HEART FAILURE WITH REDUCED EJECTION FRACTION): ICD-10-CM

## 2025-04-03 RX ORDER — SPIRONOLACTONE 25 MG/1
25 TABLET ORAL DAILY
Qty: 90 TABLET | Refills: 0 | Status: SHIPPED | OUTPATIENT
Start: 2025-04-03

## 2025-04-10 ENCOUNTER — HOSPITAL ENCOUNTER (OUTPATIENT)
Dept: RADIOLOGY | Facility: CLINIC | Age: 75
Discharge: HOME | End: 2025-04-10
Payer: MEDICARE

## 2025-04-10 ENCOUNTER — HOSPITAL ENCOUNTER (OUTPATIENT)
Dept: CARDIOLOGY | Facility: CLINIC | Age: 75
Discharge: HOME | End: 2025-04-10
Payer: MEDICARE

## 2025-04-10 DIAGNOSIS — R09.89 CAROTID BRUIT, UNSPECIFIED LATERALITY: ICD-10-CM

## 2025-04-10 LAB
EJECTION FRACTION APICAL 4 CHAMBER: 35.4
EJECTION FRACTION: 34 %
LEFT ATRIUM VOLUME AREA LENGTH INDEX BSA: 55.5 ML/M2
LEFT VENTRICLE INTERNAL DIMENSION DIASTOLE: 5.04 CM (ref 3.5–6)
RIGHT VENTRICLE PEAK SYSTOLIC PRESSURE: 43.5 MMHG

## 2025-04-10 PROCEDURE — 93325 DOPPLER ECHO COLOR FLOW MAPG: CPT | Performed by: INTERNAL MEDICINE

## 2025-04-10 PROCEDURE — 93321 DOPPLER ECHO F-UP/LMTD STD: CPT | Performed by: INTERNAL MEDICINE

## 2025-04-10 PROCEDURE — 93880 EXTRACRANIAL BILAT STUDY: CPT

## 2025-04-10 PROCEDURE — 93325 DOPPLER ECHO COLOR FLOW MAPG: CPT

## 2025-04-10 PROCEDURE — 93308 TTE F-UP OR LMTD: CPT | Performed by: INTERNAL MEDICINE

## 2025-04-10 NOTE — TELEPHONE ENCOUNTER
----- Message from Juancarlos Browning sent at 4/10/2025 11:43 AM EDT -----  Notify pt   Carotid US showed no sig blockage  Echo basically unchanged from prior

## 2025-04-11 NOTE — TELEPHONE ENCOUNTER
Attempted to reach patient to discuss testing results.  No answer and voicemail is full. Please try later.

## 2025-04-15 ENCOUNTER — TELEPHONE (OUTPATIENT)
Dept: CARDIOLOGY | Facility: CLINIC | Age: 75
End: 2025-04-15
Payer: MEDICARE

## 2025-04-15 NOTE — TELEPHONE ENCOUNTER
Copied from CRM #[id number]. Topic: Information Request - Test results   >> Apr 14, 2025 12:29 PM Maty AGUILAR wrote:  Patient would like Dr. Browning office to call her with the test result for her test she had on 4/10/25

## 2025-04-25 DIAGNOSIS — I48.91 ATRIAL FIBRILLATION, UNSPECIFIED TYPE (MULTI): Primary | ICD-10-CM

## 2025-05-29 ENCOUNTER — APPOINTMENT (OUTPATIENT)
Dept: PRIMARY CARE | Facility: CLINIC | Age: 75
End: 2025-05-29
Payer: MEDICARE

## 2025-06-02 ENCOUNTER — HOSPITAL ENCOUNTER (OUTPATIENT)
Dept: RADIOLOGY | Facility: CLINIC | Age: 75
Discharge: HOME | End: 2025-06-02
Payer: MEDICARE

## 2025-06-02 ENCOUNTER — APPOINTMENT (OUTPATIENT)
Dept: PRIMARY CARE | Facility: CLINIC | Age: 75
End: 2025-06-02
Payer: MEDICARE

## 2025-06-02 VITALS
DIASTOLIC BLOOD PRESSURE: 82 MMHG | BODY MASS INDEX: 29.66 KG/M2 | WEIGHT: 178 LBS | HEIGHT: 65 IN | TEMPERATURE: 97.8 F | SYSTOLIC BLOOD PRESSURE: 122 MMHG | HEART RATE: 73 BPM

## 2025-06-02 DIAGNOSIS — E78.49 OTHER HYPERLIPIDEMIA: ICD-10-CM

## 2025-06-02 DIAGNOSIS — I50.22 CHRONIC SYSTOLIC HEART FAILURE: ICD-10-CM

## 2025-06-02 DIAGNOSIS — Z00.00 MEDICARE ANNUAL WELLNESS VISIT, SUBSEQUENT: ICD-10-CM

## 2025-06-02 DIAGNOSIS — E08.00 DIABETES MELLITUS DUE TO UNDERLYING CONDITION WITH HYPEROSMOLARITY WITHOUT NONKETOTIC HYPERGLYCEMIC-HYPEROSMOLAR COMA (NKHHC): ICD-10-CM

## 2025-06-02 DIAGNOSIS — I10 PRIMARY HYPERTENSION: Primary | ICD-10-CM

## 2025-06-02 DIAGNOSIS — F32.A DEPRESSION, UNSPECIFIED DEPRESSION TYPE: ICD-10-CM

## 2025-06-02 DIAGNOSIS — R06.02 SOB (SHORTNESS OF BREATH): ICD-10-CM

## 2025-06-02 LAB — POC HEMOGLOBIN A1C: 6.9 % (ref 4.2–6.5)

## 2025-06-02 PROCEDURE — 71046 X-RAY EXAM CHEST 2 VIEWS: CPT | Performed by: RADIOLOGY

## 2025-06-02 PROCEDURE — 71046 X-RAY EXAM CHEST 2 VIEWS: CPT

## 2025-06-02 RX ORDER — VALSARTAN AND HYDROCHLOROTHIAZIDE 160; 25 MG/1; MG/1
1 TABLET ORAL DAILY
Qty: 90 TABLET | Refills: 1 | Status: SHIPPED | OUTPATIENT
Start: 2025-06-02

## 2025-06-02 RX ORDER — GLIPIZIDE 2.5 MG/1
2.5 TABLET, EXTENDED RELEASE ORAL DAILY
Qty: 30 TABLET | Refills: 11 | Status: SHIPPED | OUTPATIENT
Start: 2025-06-02 | End: 2026-06-02

## 2025-06-02 ASSESSMENT — ENCOUNTER SYMPTOMS
DIARRHEA: 0
CONFUSION: 0
SINUS PAIN: 0
FATIGUE: 1
BLOOD IN STOOL: 0
MYALGIAS: 0
SHORTNESS OF BREATH: 1
WEAKNESS: 0
ARTHRALGIAS: 0
DYSURIA: 0
CONSTIPATION: 0
SORE THROAT: 0
ABDOMINAL PAIN: 0
OCCASIONAL FEELINGS OF UNSTEADINESS: 0
PALPITATIONS: 0
NERVOUS/ANXIOUS: 0
BACK PAIN: 0
COUGH: 0
NECK PAIN: 0
LOSS OF SENSATION IN FEET: 0
CHILLS: 0
FREQUENCY: 0
DEPRESSION: 0
HEADACHES: 0
FEVER: 0

## 2025-06-02 ASSESSMENT — PATIENT HEALTH QUESTIONNAIRE - PHQ9
1. LITTLE INTEREST OR PLEASURE IN DOING THINGS: NOT AT ALL
SUM OF ALL RESPONSES TO PHQ9 QUESTIONS 1 AND 2: 0
2. FEELING DOWN, DEPRESSED OR HOPELESS: NOT AT ALL

## 2025-06-02 ASSESSMENT — ACTIVITIES OF DAILY LIVING (ADL)
GROCERY_SHOPPING: NEEDS ASSISTANCE
TAKING_MEDICATION: INDEPENDENT
DRESSING: INDEPENDENT
DOING_HOUSEWORK: NEEDS ASSISTANCE
BATHING: INDEPENDENT
MANAGING_FINANCES: NEEDS ASSISTANCE

## 2025-06-02 NOTE — PROGRESS NOTES
"Subjective   Patient ID: Keesha Franklin is a 75 y.o. female who presents for Medicare Annual Wellness Visit Subsequent (Pt present today for wellness visit. ls).    HPI Ms. Franklin is 75-year-old female seen in office today for Medicare wellness visit and follow-up.  Medical history significant for chronic systolic heart failure, EF 30 to 40%, hypertension, chronic kidney disease, history of CVA, diabetes, hyperlipidemia.  Today she is here for feeling short of breath for last few weeks.  She is taking furosemide every day.  Last BNP was elevated.  She has been taking all her other medications as prescribed.  She also complains of swelling in her feet.    Review of Systems   Constitutional:  Positive for fatigue. Negative for chills and fever.   HENT:  Negative for congestion, sinus pain and sore throat.    Respiratory:  Positive for shortness of breath. Negative for cough.    Cardiovascular:  Positive for leg swelling. Negative for chest pain and palpitations.   Gastrointestinal:  Negative for abdominal pain, blood in stool, constipation and diarrhea.   Genitourinary:  Negative for dysuria and frequency.   Musculoskeletal:  Negative for arthralgias, back pain, myalgias and neck pain.   Neurological:  Negative for weakness and headaches.   Psychiatric/Behavioral:  Negative for confusion. The patient is not nervous/anxious.        Objective   /82   Pulse 73   Temp 36.6 °C (97.8 °F)   Ht 1.651 m (5' 5\")   Wt 80.7 kg (178 lb)   BMI 29.62 kg/m²     Physical Exam  Vitals reviewed.   Constitutional:       General: She is not in acute distress.     Appearance: Normal appearance.   HENT:      Head: Normocephalic and atraumatic.   Cardiovascular:      Rate and Rhythm: Normal rate and regular rhythm.      Pulses: Normal pulses.   Pulmonary:      Effort: Pulmonary effort is normal. No respiratory distress.      Breath sounds: Normal breath sounds.   Abdominal:      General: Bowel sounds are normal. There is no " distension.      Tenderness: There is no abdominal tenderness.   Musculoskeletal:         General: Swelling present. No tenderness. Normal range of motion.      Cervical back: Normal range of motion.      Comments: Bilateral leg swelling-2+   Skin:     General: Skin is warm.   Neurological:      General: No focal deficit present.      Mental Status: She is alert.      Coordination: Coordination normal.      Gait: Gait normal.   Psychiatric:         Mood and Affect: Mood normal.         Behavior: Behavior normal.         Assessment/Plan   Diagnoses and all orders for this visit:  Primary hypertension  Comments:  Blood pressure is well-controlled  Continue furosemide, isosorbide, valsartan/HCTZ and metoprolol  Orders:  -     CBC; Future  -     Basic Metabolic Panel; Future  -     valsartan-hydrochlorothiazide (Diovan-HCT) 160-25 mg tablet; Take 1 tablet by mouth once daily.  Diabetes mellitus due to underlying condition with hyperosmolarity without nonketotic hyperglycemic-hyperosmolar coma ([facility])  Comments:  Hemoglobin A1c today 6.9-gotten worse slightly  Continue Ozempic 1 mg-sample provided, medication at pharmacy expensive  Restart glipizide  Orders:  -     semaglutide (OZEMPIC) 1 mg/dose (4 mg/3 mL) pen injector; Inject 1 mg under the skin 1 (one) time per week.  -     glipiZIDE XL (Glucotrol XL) 2.5 mg 24 hr tablet; Take 1 tablet (2.5 mg) by mouth once daily. Do not crush, chew, or split.  Chronic systolic heart failure  Comments:  Check BNP and chest x-ray  If breathing gets worse, patient is instructed to go to the ER for evaluation  Orders:  -     B-type natriuretic peptide; Future  SOB (shortness of breath)  Comments:  Labs and x-ray ordered  Orders:  -     XR chest 2 views; Future  Depression, unspecified depression type  Comments:  Chronic depression-stable  Continue escitalopram  Medicare annual wellness visit, subsequent  Comments:  Medicare wellness completed  Blood work ordered  Other  hyperlipidemia  Comments:  Continue atorvastatin

## 2025-06-03 ENCOUNTER — TELEPHONE (OUTPATIENT)
Dept: PRIMARY CARE | Facility: CLINIC | Age: 75
End: 2025-06-03
Payer: MEDICARE

## 2025-06-04 ENCOUNTER — TELEPHONE (OUTPATIENT)
Dept: PRIMARY CARE | Facility: CLINIC | Age: 75
End: 2025-06-04
Payer: MEDICARE

## 2025-06-04 LAB
ANION GAP SERPL CALCULATED.4IONS-SCNC: 14 MMOL/L (CALC) (ref 7–17)
BNP SERPL-MCNC: 2202 PG/ML
BUN SERPL-MCNC: 37 MG/DL (ref 7–25)
BUN/CREAT SERPL: 21 (CALC) (ref 6–22)
CALCIUM SERPL-MCNC: 10.1 MG/DL (ref 8.6–10.4)
CHLORIDE SERPL-SCNC: 102 MMOL/L (ref 98–110)
CO2 SERPL-SCNC: 24 MMOL/L (ref 20–32)
CREAT SERPL-MCNC: 1.8 MG/DL (ref 0.6–1)
EGFRCR SERPLBLD CKD-EPI 2021: 29 ML/MIN/1.73M2
ERYTHROCYTE [DISTWIDTH] IN BLOOD BY AUTOMATED COUNT: 14.2 % (ref 11–15)
GLUCOSE SERPL-MCNC: 97 MG/DL (ref 65–99)
HCT VFR BLD AUTO: 42.6 % (ref 35–45)
HGB BLD-MCNC: 13.3 G/DL (ref 11.7–15.5)
MCH RBC QN AUTO: 28.7 PG (ref 27–33)
MCHC RBC AUTO-ENTMCNC: 31.2 G/DL (ref 32–36)
MCV RBC AUTO: 92 FL (ref 80–100)
PLATELET # BLD AUTO: 175 THOUSAND/UL (ref 140–400)
PMV BLD REES-ECKER: 11.7 FL (ref 7.5–12.5)
POTASSIUM SERPL-SCNC: 3.8 MMOL/L (ref 3.5–5.3)
RBC # BLD AUTO: 4.63 MILLION/UL (ref 3.8–5.1)
SODIUM SERPL-SCNC: 140 MMOL/L (ref 135–146)
WBC # BLD AUTO: 4.6 THOUSAND/UL (ref 3.8–10.8)

## 2025-06-08 DIAGNOSIS — I10 PRIMARY HYPERTENSION: ICD-10-CM

## 2025-06-08 DIAGNOSIS — E78.5 HYPERLIPIDEMIA, UNSPECIFIED HYPERLIPIDEMIA TYPE: ICD-10-CM

## 2025-06-09 RX ORDER — ATORVASTATIN CALCIUM 80 MG/1
TABLET, FILM COATED ORAL
Qty: 90 TABLET | Refills: 1 | Status: SHIPPED | OUTPATIENT
Start: 2025-06-09

## 2025-06-09 RX ORDER — VALSARTAN AND HYDROCHLOROTHIAZIDE 160; 25 MG/1; MG/1
1 TABLET ORAL DAILY
Qty: 90 TABLET | Refills: 1 | Status: SHIPPED | OUTPATIENT
Start: 2025-06-09

## 2025-06-09 RX ORDER — ESCITALOPRAM OXALATE 10 MG/1
10 TABLET ORAL DAILY
Qty: 90 TABLET | Refills: 1 | Status: SHIPPED | OUTPATIENT
Start: 2025-06-09

## 2025-06-17 ENCOUNTER — OFFICE VISIT (OUTPATIENT)
Dept: CARDIOLOGY | Facility: CLINIC | Age: 75
End: 2025-06-17
Payer: MEDICARE

## 2025-06-17 VITALS
HEIGHT: 63 IN | SYSTOLIC BLOOD PRESSURE: 136 MMHG | HEART RATE: 112 BPM | OXYGEN SATURATION: 94 % | DIASTOLIC BLOOD PRESSURE: 85 MMHG | BODY MASS INDEX: 31.48 KG/M2 | WEIGHT: 177.7 LBS

## 2025-06-17 DIAGNOSIS — I50.20 HFREF (HEART FAILURE WITH REDUCED EJECTION FRACTION): Primary | ICD-10-CM

## 2025-06-17 DIAGNOSIS — I50.20 HFREF (HEART FAILURE WITH REDUCED EJECTION FRACTION): ICD-10-CM

## 2025-06-17 DIAGNOSIS — I10 PRIMARY HYPERTENSION: ICD-10-CM

## 2025-06-17 DIAGNOSIS — I48.91 ATRIAL FIBRILLATION, UNSPECIFIED TYPE (MULTI): ICD-10-CM

## 2025-06-17 PROCEDURE — 3079F DIAST BP 80-89 MM HG: CPT | Performed by: INTERNAL MEDICINE

## 2025-06-17 PROCEDURE — 99212 OFFICE O/P EST SF 10 MIN: CPT

## 2025-06-17 PROCEDURE — 1126F AMNT PAIN NOTED NONE PRSNT: CPT | Performed by: INTERNAL MEDICINE

## 2025-06-17 PROCEDURE — 1159F MED LIST DOCD IN RCRD: CPT | Performed by: INTERNAL MEDICINE

## 2025-06-17 PROCEDURE — 3075F SYST BP GE 130 - 139MM HG: CPT | Performed by: INTERNAL MEDICINE

## 2025-06-17 PROCEDURE — 99214 OFFICE O/P EST MOD 30 MIN: CPT | Performed by: INTERNAL MEDICINE

## 2025-06-17 RX ORDER — FUROSEMIDE 20 MG/1
80 TABLET ORAL DAILY
Qty: 360 TABLET | Refills: 1 | Status: SHIPPED | OUTPATIENT
Start: 2025-06-17 | End: 2026-06-17

## 2025-06-17 RX ORDER — ISOSORBIDE MONONITRATE 60 MG/1
TABLET, EXTENDED RELEASE ORAL
Qty: 90 TABLET | Refills: 1 | Status: SHIPPED | OUTPATIENT
Start: 2025-06-17

## 2025-06-17 ASSESSMENT — PAIN SCALES - GENERAL: PAINLEVEL_OUTOF10: 0-NO PAIN

## 2025-06-17 ASSESSMENT — COLUMBIA-SUICIDE SEVERITY RATING SCALE - C-SSRS
1. IN THE PAST MONTH, HAVE YOU WISHED YOU WERE DEAD OR WISHED YOU COULD GO TO SLEEP AND NOT WAKE UP?: NO
6. HAVE YOU EVER DONE ANYTHING, STARTED TO DO ANYTHING, OR PREPARED TO DO ANYTHING TO END YOUR LIFE?: NO
2. HAVE YOU ACTUALLY HAD ANY THOUGHTS OF KILLING YOURSELF?: NO

## 2025-06-17 ASSESSMENT — ENCOUNTER SYMPTOMS
VOMITING: 0
WHEEZING: 0
FALLS: 0
CHILLS: 0
BLOATING: 0
NAUSEA: 0
LOSS OF SENSATION IN FEET: 0
MEMORY LOSS: 0
HEADACHES: 0
HEMATURIA: 0
ALTERED MENTAL STATUS: 0
DEPRESSION: 0
ABDOMINAL PAIN: 0
MYALGIAS: 0
DYSURIA: 0
HEMOPTYSIS: 0
CONSTIPATION: 0
DIARRHEA: 0
COUGH: 0
FEVER: 0
OCCASIONAL FEELINGS OF UNSTEADINESS: 1

## 2025-06-17 ASSESSMENT — PATIENT HEALTH QUESTIONNAIRE - PHQ9
2. FEELING DOWN, DEPRESSED OR HOPELESS: NOT AT ALL
SUM OF ALL RESPONSES TO PHQ9 QUESTIONS 1 AND 2: 0
1. LITTLE INTEREST OR PLEASURE IN DOING THINGS: NOT AT ALL

## 2025-06-17 NOTE — PATIENT INSTRUCTIONS
Change Valsartan-HCTZ to Entresto 24-26mg 2x/day  Next we may try adding Jardiance  Consider ablation (see electric heart doctor again)

## 2025-06-17 NOTE — PROGRESS NOTES
Chief Complaint   Patient presents with    Coronary Artery Disease    Heart Failure    Atrial Fibrillation         HPI  76 yo BF w/ h/o CAD s/p D2 POBA 1/19 (LAD ), s/p MI 1/19, HFrEF/CM, parox AF/FL, HTN, HLD, DM, CVA 9/17 (no resid), breast Ca s/p mast now here for cardiology FU. No chest pain. No dyspnea at rest. +ARANDA (mild-mod exertion). No orthopnea/PND. No palps. +occ brief LH on standing up. No syncope. +LE edema. No claudication. No cough. +occ fatigue. +snoring. +occ RUE cramp x seconds. +occ hearing heartbeat.   ECG 6/22: SR (74), PACs, RBBB, LAFB  ECG 9/23: SR (75), PACs, RBBB, LAFB, ?LMI  ECG 1/24: AFIB (62), RBBB, LAFB, ?ALMI  ECG 4/24: SR (77), RBBB, LAFB, ?LMI  ECG 7/24: SR (81), PACs, 1st deg AVB, iRBBB, IMI  ECG 9/24: SR (74), RBBB, LVH, ?LMI  ECG 3/25: SR (84), 1st deg AVB, RBBB, IMI, ALMI  ECG 3/25: SR (81), PACs, RBBB, IMI, ALMI   11/17: SR, HR  (avg 76), SVT x29 (long 23s)   1/24: SR w/ parox AF/FL 56%, HR  (avg 62), SVT x69 (long 49s), 2nd deg AVB-I  Echo 6/21: EF 55-60%, pseudonl/DD, mod LAE, mild MR  Echo 2/24: EF 35-40%, apex HK, DD, mod LAE, smam R mass, mild-mod MR  Echo 4/25: EF 34%, mod LAE, mod MR, mild-mod TR, PASP 43  cMRI 10/24: EF 45%, no infarct/infiltrate, lip hyper IAS, no RA mass, no MR  Stress cMRI 3/19: EF 39%, LAD scar/ischemia, nl RV  Nuc 4/24: no ischemia, large apex scar, EF 47%  Cath 1/19; mLAD 100%, pD2 100% (POBA)  Cath 6/21: pLAD 30%, p-mLAD 30%, mLAD 100% (L->L collat), o-pD2 99%, LVEDP 18-21, no AVS  CXR 1/24: no acute abnl  CXR 1/25: bibasilar opacities  CXR 3/25: mild patchy GGO  CXR 6/25: CM, mild perihilar + basilar edema  CTA chest 4/19: no PE, nl PA, mild CAC, nl heart size, no peric eff, mild AA, no aneurysm  CT chest 1/25: mod B pleur eff  MRI brain 9/17: acute infarct L front operculum  MRA head/neck 9/17: BICA sig loss (?artifact)   Carotid US 4/25: mild athero, no HDSS    Review of Systems   Constitutional: Negative for chills, fever and  malaise/fatigue.   HENT:  Negative for hearing loss.    Eyes:  Negative for visual disturbance.   Respiratory:  Negative for cough, hemoptysis and wheezing.    Skin:  Negative for rash.   Musculoskeletal:  Negative for falls and myalgias.   Gastrointestinal:  Negative for bloating, abdominal pain, constipation, diarrhea, dysphagia, nausea and vomiting.   Genitourinary:  Negative for dysuria and hematuria.   Neurological:  Negative for headaches.   Psychiatric/Behavioral:  Negative for altered mental status, depression and memory loss.       Social History     Tobacco Use    Smoking status: Never     Passive exposure: Never    Smokeless tobacco: Never   Substance Use Topics    Alcohol use: Not Currently     Alcohol/week: 1.0 standard drink of alcohol     Types: 1 Standard drinks or equivalent per week      Family History   Problem Relation Name Age of Onset    Hypertension Mother      Coronary artery disease Father      Other (cardiac disorder) Father      Hypertension Sister      COPD Sister      COPD Brother        Allergies   Allergen Reactions    Jardiance [Empagliflozin] Swelling     Legs become swollen and painful with shooting pains    Lisinopril Angioedema and Unknown      Current Outpatient Medications   Medication Instructions    apixaban (ELIQUIS) 5 mg, oral, 2 times daily    aspirin 81 mg EC tablet 1 tablet, Daily    atorvastatin (Lipitor) 80 mg tablet TAKE 1 TABLET(80 MG) BY MOUTH DAILY AT BEDTIME    blood sugar diagnostic (Accu-Chek Tiffanie Plus test strp) strip USE TO TEST THREE TIMES A DAY    blood-glucose sensor (Dexcom G7 Sensor) device 1 kit, miscellaneous, Daily    escitalopram (LEXAPRO) 10 mg, oral, Daily    furosemide (Lasix) 20 mg tablet TAKE 2 TABLETS(40 MG) BY MOUTH DAILY    glipiZIDE XL (GLUCOTROL XL) 2.5 mg, oral, Daily, Do not crush, chew, or split.    incontinence pad, liner, disp pad 1 each, miscellaneous, 2 times daily    isosorbide mononitrate ER (IMDUR) 60 mg, oral, Daily, Do not crush  "or chew.    metoprolol succinate XL (TOPROL-XL) 12.5 mg, oral, Nightly    nitroglycerin (NITROSTAT) 0.4 mg, Every 5 min PRN    pen needle, diabetic (BD Ultra-Fine Sameera Pen Needle) 32 gauge x \" needle USE AS DIRECTED TO TEST THREE TIMES A DAY    semaglutide (OZEMPIC) 1 mg, subcutaneous, Once Weekly    spironolactone (ALDACTONE) 25 mg, oral, Daily    valsartan-hydrochlorothiazide (Diovan-HCT) 160-25 mg tablet 1 tablet, oral, Daily        /85 (BP Location: Right arm, Patient Position: Sitting, BP Cuff Size: Adult)   Pulse (!) 112   Ht 1.6 m (5' 3\")   Wt 80.6 kg (177 lb 11.2 oz)   SpO2 94%   BMI 31.48 kg/m²       Physical Exam  Constitutional:       Appearance: Normal appearance.   HENT:      Head: Normocephalic and atraumatic.      Nose: Nose normal.   Neck:      Vascular: Carotid bruit and JVD present.      Comments: Bruit suspected radiation from murmur  Cardiovascular:      Rate and Rhythm: Normal rate. Rhythm irregular. Extrasystoles are present.     Heart sounds: Murmur heard.      Systolic murmur is present with a grade of 2/6.      Friction rub present.      Comments: No Lasix taken today  Pulmonary:      Effort: Pulmonary effort is normal.      Breath sounds: Normal breath sounds.   Abdominal:      Palpations: Abdomen is soft.      Tenderness: There is no abdominal tenderness.   Musculoskeletal:      Right lower le+ Pitting Edema present.      Left lower le+ Pitting Edema present.   Skin:     General: Skin is warm and dry.   Neurological:      General: No focal deficit present.      Mental Status: She is alert.   Psychiatric:         Mood and Affect: Mood normal.         Judgment: Judgment normal.        Results/Data   Cr 1.8, K 3.8, HGB 13.3, , BNP 2202 (Darlyn-HCTZ, [place], Lasix)  3/25 Cr 1.1, K 3.6, LFT nl, HGB 12.4, , HSTPN 26, BNP 2227   Cr 1.16, K 4.1, HSTPN 46, BNP 2774   Cr 1.2, K 3.9  2/24 Cr 1.46, K 4.4, Mg 3.0, LFT nl, LDL 76, HDL 62, , Chol 165, " HGB 13.9, , hgba1c 9.6, TSH 2.6  1/24 Cr 1.34, K 3.7, Mg 1.96, TSH 3.6,   8/23 Cr 1.3, K 3.7, LFT nl, hgba1c 7.2  12/22 Cr 1.21, K 3.9, LFT nl, LDL 71, HDL 56, TG 88, Chol 145, HGB 13, , hgba1c 8.8, TSH 3.44  1/19 TPN 11     Assessment/Plan   76 yo BF w/ h/o CAD s/p D2 POBA 1/19 (LAD ), s/p MI 1/19, HFrEF/CM, parox AF/FL, HTN, HLD, DM, CVA 9/17 (no resid), breast Ca s/p mast now w/ continued ARANDA/edema c/w decomp CHF, likely worsened by AFIB. Change Darlyn-HCTZ to Entresto (also HCTZ may be irritating kidney more with other diuretics). Re-refer to EP to consider options to control heart rhythm (ie RFA).   -continue ASA 81 every day  -continue Eliquis 5 bid   -continue Metoprolol Succinate 25 qd  -change Valsartan-HCTZ 160-25 every day to Entresto 24-26 bid (then increase as needed/tolerated)  -continue Imdur 60 every day  -continue Lasix 80 every day (may need to increase)  -continue Jerardo 25 every day   -continue Atorva 80 every day   -consider retry Jardiance (she thinks it caused her SE)  -f/u 1 month (earlier if needed)     Juancarlos Browning MD

## 2025-06-18 ENCOUNTER — TELEPHONE (OUTPATIENT)
Dept: CARDIOLOGY | Facility: HOSPITAL | Age: 75
End: 2025-06-18
Payer: MEDICARE

## 2025-06-18 DIAGNOSIS — I50.20 HFREF (HEART FAILURE WITH REDUCED EJECTION FRACTION): Primary | ICD-10-CM

## 2025-06-30 NOTE — TELEPHONE ENCOUNTER
Left message with patient to call triage office to discuss.  I also left phone number of the [employer] to determine if she qualifies for reduced cost Entresto. I asked that she contact them first and then call us if she does not qualify.

## 2025-07-03 NOTE — TELEPHONE ENCOUNTER
Tried to reach patient to discuss cost issues with Entresto  got voicemail left message to call [phone].

## 2025-07-14 ENCOUNTER — TELEPHONE (OUTPATIENT)
Dept: CARDIOLOGY | Facility: HOSPITAL | Age: 75
End: 2025-07-14

## 2025-07-14 DIAGNOSIS — I50.20 HFREF (HEART FAILURE WITH REDUCED EJECTION FRACTION): Primary | ICD-10-CM

## 2025-07-18 NOTE — PROGRESS NOTES
"  Pharmacist Clinic: Cardiology Management    Keesha Franklin is a 75 y.o. female was referred to Clinical Pharmacy Team for anticoagulation and heart failure management.     Referring Provider: Juancarlos Browning MD    THIS IS A FOLLOW UP PATIENT APPOINTMENT. AT LAST VISIT ON 8/27/2024 WITH PHARMACIST (Salvador Magaña).    Appointment was completed by *** who was reached at ***.    REVIEW OF PAST APPNT (IF APPLICABLE):   Patient reports that she did not feel the need to take the Eliquis twice daily, and did not understand why she would need to use the medications twice a day. Discussed the importance of using Eliquis two times a day to help cover blood thinning effect for the full day. She understands and reported she will start taking twice a day. Reports she did not have any recent signs or symptoms of blood clots since the last visit.  She has some bruising during the day, but reports that all bruises heal quickly and without concern.    Allergies Reviewed? {YES/NA/NO:65936}    Allergies[1]    Medical History[2]    Medications Ordered Prior to Encounter[3]      RELEVANT LAB RESULTS:  Lab Results   Component Value Date    BILITOT 0.7 03/05/2025    CALCIUM 10.1 06/02/2025    CO2 24 06/02/2025     06/02/2025    CREATININE 1.80 (H) 06/02/2025    GLUCOSE 97 06/02/2025    ALKPHOS 78 03/05/2025    K 3.8 06/02/2025    PROT 6.9 03/05/2025     06/02/2025    AST 29 03/05/2025    ALT 35 03/05/2025    BUN 37 (H) 06/02/2025    ANIONGAP 14 06/02/2025    MG 1.96 01/21/2025    ALBUMIN 3.9 03/05/2025    GFRF 43 (A) 08/16/2023     Lab Results   Component Value Date    TRIG 135 02/23/2024    CHOL 165 02/23/2024    LDLCALC 76 02/23/2024    HDL 62.4 02/23/2024     No results found for: \"BMCBC\", \"CBCDIF\"     PHARMACEUTICAL ASSESSMENT:    MEDICATION RECONCILIATION    Was a medication reconciliation completed at this visit? {yes,no:79327}  Home Pharmacy Reviewed? {YES-DESCRIBE/NO:34679}    Added:  - ***  Changed:  - " ***  Removed:  - ***    Drug Interactions? No clinically significant drug interactions requiring change in therapy found at the time of this visit.     Medication Documentation Review Audit       Reviewed by Kim Toribio MA (Medical Assistant) on 06/17/25 at 0931      Medication Order Taking? Sig Documenting Provider Last Dose Status   apixaban (Eliquis) 5 mg tablet [id number] Yes Take 1 tablet (5 mg) by mouth 2 times a day. Gabriel Mccabe MD  Active   aspirin 81 mg EC tablet [id number] Yes Take 1 tablet (81 mg) by mouth once daily. Historical Provider, MD  Active   atorvastatin (Lipitor) 80 mg tablet [id number] Yes TAKE 1 TABLET(80 MG) BY MOUTH DAILY AT BEDTIME Gabriel Mccabe MD  Active   blood sugar diagnostic (Accu-Chek Tiffanie Plus test strp) strip [id number] Yes USE TO TEST THREE TIMES A DAY Historical Provider, MD  Active   blood-glucose sensor (Dexcom G7 Sensor) device [id number] Yes 1 kit once daily. Gabriel Mccabe MD  Active   escitalopram (Lexapro) 10 mg tablet [id number] Yes TAKE 1 TABLET(10 MG) BY MOUTH DAILY Gabriel Mccabe MD  Active   furosemide (Lasix) 20 mg tablet [id number] Yes TAKE 2 TABLETS(40 MG) BY MOUTH DAILY   Patient taking differently: Take 4 tablets (80 mg) by mouth once daily.    Juancarlos Browning MD  Active   glipiZIDE XL (Glucotrol XL) 2.5 mg 24 hr tablet [id number] Yes Take 1 tablet (2.5 mg) by mouth once daily. Do not crush, chew, or split. Gabriel Mccabe MD  Active   incontinence pad, liner, disp pad [id number] Yes 1 each 2 times a day. Gabriel Mccabe MD  Active   isosorbide mononitrate ER (Imdur) 60 mg 24 hr tablet [id number] Yes Take 1 tablet (60 mg) by mouth once daily. Do not crush or chew. Gabriel Mccabe MD  Active   metoprolol succinate XL (Toprol-XL) 25 mg 24 hr tablet [id number] Yes Take 0.5 tablets (12.5 mg) by mouth once daily at bedtime. Gabriel Mccabe MD  Active   nitroglycerin (Nitrostat) 0.4 mg SL tablet [id number] Yes Place 1 tablet (0.4 mg) under the tongue  "every 5 minutes if needed for chest pain. Up to 3 times. Historical Provider, MD  Active   pen needle, diabetic (BD Ultra-Fine Sameera Pen Needle) 32 gauge x 5/32\" needle [id number] Yes USE AS DIRECTED TO TEST THREE TIMES A DAY Gabriel Mccabe MD  Active   semaglutide (OZEMPIC) 1 mg/dose (4 mg/3 mL) pen injector [id number] Yes Inject 1 mg under the skin 1 (one) time per week. Gabriel Mccabe MD  Active   spironolactone (Aldactone) 25 mg tablet [id number] Yes TAKE 1 TABLET(25 MG) BY MOUTH DAILY Juancarlos Browning MD  Active   valsartan-hydrochlorothiazide (Diovan-HCT) 160-25 mg tablet [id number] Yes TAKE 1 TABLET BY MOUTH DAILY Gabriel Mccabe MD  Active                    DISEASE MANAGEMENT ASSESSMENT:     ANTICOAGULATION ASSESSMENT    DIAGNOSIS: prevention of nonvalvular atrial fibrilliation stroke and systemic embolism  - Patient is projected to be on anticoagulation indefinitely    The ASCVD Risk score (Tanya DALTON, et al., 2019) failed to calculate for the following reasons:    Risk score cannot be calculated because patient has a medical history suggesting prior/existing ASCVD    MADY VASC SCORING CALCULATOR:   [id number] Stroke Risk Points: 7   Values used to calculate this score:    Points  Metrics       1        Has Congestive Heart Failure: Yes       1        Has Hypertension: Yes       2        Age: 75       1        Has Diabetes: Yes       0        Had Stroke: No                 Had TIA: No                 Had Thromboembolism: No       1        Has Vascular Disease: Yes       1        Clinically Relevant Sex: Female    Lip GH, et al. 2009. © 2010 [employer]     CURRENT PHARMACOTHERAPY:    Eliquis 5 mg BID      RELEVANT PAST MEDICAL HISTORY:   A-fib, HTN, HLD, CHF, Hx of NSTEMI, T2DM    Affordability/Accessibility: ***  Adherence/Organization: ***previously reported only taking morning dosage   Adverse Reactions: ***  Recent Hospitalizations: ***  Recent Falls/Trauma: ***  Changes in " Tobacco or Alcohol Intake:   Tobacco: ***  Alcohol: ***    CHF ASSESSMENT     Symptom/Staging:  -Most recent ejection fraction: 34%  -NYHA Stage: Unknown    Results for orders placed in visit on 03/31/25    Transthoracic Echo Limited    [facility] at [facility], [address] [place] [address]  Tel [phone] and Fax [phone]    TRANSTHORACIC ECHOCARDIOGRAM REPORT      Patient Name:       YANCI ZEPEDA     Reading Physician:    [id number] Chuy Browning MD  Study Date:         4/10/2025           Ordering Provider:    [id number] FERDY BROWNING  MRN/PID:            [id number]            Fellow:  Accession#:         [id number]        Nurse:  Date of Birth/Age:  1950 / 75      Sonographer:          Rachel Solitario RDCS  years  Gender assigned at  F                   Additional Staff:  Birth:  Height:             162.56 cm           Admit Date:  Weight:             77.11 kg            Admission Status:     Outpatient  BSA / BMI:          1.83 m2 / 29.18     Encounter#:           [id number]  kg/m2  Blood Pressure:     134/84 mmHg         Department Location:  [facility]  Echo Lab    Study Type:    TRANSTHORACIC ECHO (TTE) LIMITED  Diagnosis/ICD: Unspecified atrial fibrillation-I48.91  Indication:    AFib  CPT Code:      Echo Limited-35638; Doppler Limited-89291; Color Doppler-41182    Patient History:  Valve Disorders:   Mitral Regurgitation and Tricuspid Regurgitation.  Pertinent History: CVA, A-Fib, CHF, CAD, HTN and Hyperlipidemia. Breast cancer,  Mastectomy.    Study Detail: The following Echo studies were performed: 2D, Doppler and color  flow.      PHYSICIAN INTERPRETATION:  Left Ventricle: Left ventricular ejection fraction is moderately decreased, calculated by Finley's biplane at 34%. There are no regional left ventricular wall motion abnormalities. The left ventricular cavity size is normal. There is normal septal and normal posterior left ventricular wall thickness.  Left ventricular diastolic filling was not assessed.  Left Atrium: The left atrial size is moderately dilated.  Right Ventricle: The right ventricle is normal in size. There is normal right ventricular global systolic function.  Right Atrium: The right atrium is mildly dilated.  Aortic Valve: The aortic valve is trileaflet. There is no evidence of aortic valve regurgitation.  Mitral Valve: The mitral valve is normal in structure. There is moderate mitral valve regurgitation.  Tricuspid Valve: The tricuspid valve is structurally normal. There is mild to moderate tricuspid regurgitation. The Doppler estimated RVSP is mildly elevated at 43.5 mmHg.  Pulmonic Valve: The pulmonic valve is structurally normal. There is trace pulmonic valve regurgitation.  Pericardium: There is no pericardial effusion noted.  Aorta: The aortic root is normal.  Systemic Veins: The inferior vena cava appears normal in size.  In comparison to the previous echocardiogram(s): Compared with study dated 2/7/2024, no significant change.      CONCLUSIONS:  1. Left ventricular ejection fraction is moderately decreased, calculated by Finley's biplane at 34%.  2. The left atrial size is moderately dilated.  3. Moderate mitral valve regurgitation.  4. Mild to moderate tricuspid regurgitation visualized.  5. Mildly elevated right ventricular systolic pressure.    QUANTITATIVE DATA SUMMARY:    2D MEASUREMENTS:          Normal Ranges:  LAs:             5.23 cm  (2.7-4.0cm)  IVSd:            0.70 cm  (0.6-1.1cm)  LVPWd:           0.77 cm  (0.6-1.1cm)  LVIDd:           5.04 cm  (3.9-5.9cm)  LVIDs:           4.12 cm  LV Mass Index:   67 g/m2  LVEDV Index:     54 ml/m2  LV % FS          18.2 %      LEFT ATRIUM:                 Normal Ranges:  LA Vol A4C:       99.1 ml    (22+/-6mL/m2)  LA Vol A2C:       91.7 ml  LA Vol BP:        101.4 ml  LA Vol Index A4C: 54.3 ml/m2  LA Vol Index A2C: 50.2 ml/m2  LA Vol Index BP:  55.5 ml/m2  LA Volume Index:  55.4  ml/m2  LA Vol A4C:       88.6 ml  LA Vol A2C:       83.3 ml  LA Vol Index BSA: 47.1 ml/m2      RIGHT ATRIUM:          Normal Ranges:  RA Area A4C:  17.9 cm2      LV SYSTOLIC FUNCTION:  Normal Ranges:  EF-A4C View:    35 % (>=55%)  EF-A2C View:    30 %  EF-Biplane:     34 %  LV EF Reported: 34 %      TRICUSPID VALVE/RVSP:          Normal Ranges:  Peak TR Velocity:     3.18 m/s  RV Syst Pressure:     43 mmHg  (< 30mmHg)      [id number] Chuy Browning MD  Electronically signed on 4/10/2025 at 11:13:58 AM        ** Final **      Guideline-Directed Medical Therapy:  -ARNI: Yes, describe: Entresto 24-26 mg BID  -Beta Blocker: Yes, describe: Metoprolol succinate 25 mg 0.5 tablets (12.5 mg) nightly  -MRA: Yes, describe: Spironolactone 25 mg every day   -SGLT2i: Yes, describe: Jardiance 10 mg every day     Secondary Prevention:  -The ASCVD Risk score (Tanya DALTON, et al., 2019) failed to calculate for the following reasons:    Risk score cannot be calculated because patient has a medical history suggesting prior/existing ASCVD   -Aspirin 81mg? yes  -Statin?: Yes, describe: Atorvastatin 80 mg every day    -HTN?: Yes, describe: 136/85  as of 6/17/2025    CURRENT PHARMACOTHERAPY:   Jardiance 10 mg every day   Furosemide 20 mg 4 tablets (80 mg) every day   Metoprolol succinate 25 mg 0.5 tablets (12.5 mg) every day   Entresto 24-26 mg BID  Spironolactone 25 mg every day     Affordability: ***  Adherence/Compliance: ***  Adverse Effects: ***    Monitoring Weights at Home: {YES/NA/NO:68826}  Home Weight Recordings: ***    Past In Office Weight Readings:   Wt Readings from Last 6 Encounters:   06/17/25 80.6 kg (177 lb 11.2 oz)   06/02/25 80.7 kg (178 lb)   03/31/25 81.2 kg (179 lb)   03/13/25 77.1 kg (170 lb)   03/05/25 77.1 kg (170 lb)   01/21/25 80.9 kg (178 lb 6.4 oz)       Monitoring Blood Pressure at Home: {YES/NA/NO:82291}  Home BP Recordings: ***    Past In Office BP Readings:   BP Readings from Last 6 Encounters:   06/17/25  136/85   06/02/25 122/82   03/31/25 134/84   03/05/25 (!) 115/98   01/22/25 123/77   12/13/24 144/88       HEALTH MANAGEMENT    Maintaining fluid restriction (<2 L/day): N/A  Edema/swelling: {YES/NA/NO:95157}  Shortness of breath: {YES/NA/NO:68454}  Trouble sleeping/lying down: {YES/NA/NO:79158}  Dry/hacking cough: {YES/NA/NO:20565}  Recent Hospitalizations: {YES-DESCRIBE/NO:29223}    EDUCATION/COUNSELING:   - Counseled patient on MOA, expectations, duration of therapy, contraindications, administration, and monitoring parameters  - Counseled patient on lifestyle modifications that can decrease your risk of having complications (smoking cessation, losing weight, daily weights, vaccines)  - Counseled patient on fluid intake and weight management. Recommended to not consume more than 2 liters of fliuids per day. If they have gained more than 2-3 pounds within a 24 hours period (or 5 pounds in a week), contact their cardiologist  - Answered all patient questions and concerns   EDUCATION/COUNSELING:   - Counseled patient on MOA, expectations, duration of therapy, contraindications, administration, and monitoring parameters  - Counseled patient of side effects that are indicative of bleeding such as dark tarry stool, unexplainable bruising, or vomiting up a coffee ground like substance  - ***       DISCUSSION/NOTES:   ***    ASSESSMENT:    Assessment/Plan   Problem List Items Addressed This Visit    None        RECOMMENDATIONS/PLAN:    ***    Last Appnt with Referring Provider: 6/17/2025  Next Appnt with Referring Provider: 8/5/2025  Clinical Pharmacist follow up: ***  VAF/Application Expiration: {YES/DATE/NO:97705}  Type of Encounter: {VRT/INPRSN:45849}    Sierra Cifuentes PharmD    Verbal consent to manage patient's drug therapy was obtained from {PTAUTH:55448}. They were informed they may decline to participate or withdraw from participation in pharmacy services at any time.    Continue all meds under the continuation of  care with the referring provider and clinical pharmacy team.            [1]   Allergies  Allergen Reactions    Lisinopril Angioedema and Unknown   [2]   Past Medical History:  Diagnosis Date    Adjustment disorder with depressed mood 11/11/2014    Grief reaction    Body mass index (BMI) 35.0-35.9, adult 08/30/2021    Body mass index (BMI) of 35.0 to 35.9 in adult    Cardiac murmur, unspecified 06/18/2021    Systolic murmur    Cerebral infarction, unspecified (Multi) 10/25/2022    Acute cerebrovascular accident (CVA)    Chronic kidney disease, stage 3a (Multi) 04/19/2022    Chronic renal impairment, stage 3a    Encounter for general adult medical examination without abnormal findings 01/28/2021    Medicare annual wellness visit, subsequent    Encounter for screening for malignant neoplasm of colon 11/07/2019    Colon cancer screening    Encounter for screening for malignant neoplasm of colon 01/28/2021    Colon cancer screening    Essential (primary) hypertension 10/25/2022    Benign essential hypertension    Mammographic microcalcification found on diagnostic imaging of breast 09/09/2014    Abnormal mammogram with microcalcification    Menopausal and female climacteric states 11/07/2019    Menopause syndrome    Obesity, unspecified 06/30/2022    Class 2 obesity with body mass index (BMI) of 35.0 to 35.9 in adult    Pain in left foot 08/01/2022    Left foot pain    Pain in left hip 12/20/2016    Left hip pain    Personal history of other endocrine, nutritional and metabolic disease 10/25/2022    History of diabetes mellitus    Polyneuropathy, unspecified 08/01/2022    Peripheral neuropathy    Primary osteoarthritis, unspecified ankle and foot 08/01/2022    Arthritis of foot    Type 2 diabetes mellitus with diabetic chronic kidney disease (Multi) 08/30/2021    Diabetes mellitus with stage 3a chronic kidney disease, without long-term current use of insulin   [3]   Current Outpatient Medications on File Prior to Visit  "  Medication Sig Dispense Refill    apixaban (Eliquis) 5 mg tablet Take 1 tablet (5 mg) by mouth 2 times a day. 180 tablet 1    aspirin 81 mg EC tablet Take 1 tablet (81 mg) by mouth once daily.      atorvastatin (Lipitor) 80 mg tablet TAKE 1 TABLET(80 MG) BY MOUTH DAILY AT BEDTIME 90 tablet 1    blood sugar diagnostic (Accu-Chek Tiffanie Plus test strp) strip USE TO TEST THREE TIMES A DAY      blood-glucose sensor (Dexcom G7 Sensor) device 1 kit once daily. 1 each 1    empagliflozin (Jardiance) 10 mg tablet Take 1 tablet (10 mg) by mouth once daily. 90 tablet 3    escitalopram (Lexapro) 10 mg tablet TAKE 1 TABLET(10 MG) BY MOUTH DAILY 90 tablet 1    furosemide (Lasix) 20 mg tablet Take 4 tablets (80 mg) by mouth once daily. 360 tablet 1    glipiZIDE XL (Glucotrol XL) 2.5 mg 24 hr tablet Take 1 tablet (2.5 mg) by mouth once daily. Do not crush, chew, or split. 30 tablet 11    incontinence pad, liner, disp pad 1 each 2 times a day. 100 each 3    isosorbide mononitrate ER (Imdur) 60 mg 24 hr tablet TAKE 1 TABLET(60 MG) BY MOUTH DAILY. DO NOT CRUSH OR CHEW 90 tablet 1    metoprolol succinate XL (Toprol-XL) 25 mg 24 hr tablet Take 0.5 tablets (12.5 mg) by mouth once daily at bedtime. 90 tablet 1    nitroglycerin (Nitrostat) 0.4 mg SL tablet Place 1 tablet (0.4 mg) under the tongue every 5 minutes if needed for chest pain. Up to 3 times.      pen needle, diabetic (BD Ultra-Fine Sameera Pen Needle) 32 gauge x 5/32\" needle USE AS DIRECTED TO TEST THREE TIMES A  each 5    sacubitriL-valsartan (Entresto) 24-26 mg tablet Take 1 tablet by mouth 2 times a day. 180 tablet 1    semaglutide (OZEMPIC) 1 mg/dose (4 mg/3 mL) pen injector Inject 1 mg under the skin 1 (one) time per week. 3 mL 2    spironolactone (Aldactone) 25 mg tablet TAKE 1 TABLET(25 MG) BY MOUTH DAILY 90 tablet 0     No current facility-administered medications on file prior to visit.     "

## 2025-07-21 ENCOUNTER — APPOINTMENT (OUTPATIENT)
Dept: PHARMACY | Facility: HOSPITAL | Age: 75
End: 2025-07-21
Payer: MEDICARE

## 2025-07-23 DIAGNOSIS — I50.20 HFREF (HEART FAILURE WITH REDUCED EJECTION FRACTION): ICD-10-CM

## 2025-07-29 ENCOUNTER — APPOINTMENT (OUTPATIENT)
Dept: PHARMACY | Facility: HOSPITAL | Age: 75
End: 2025-07-29
Payer: MEDICARE

## 2025-07-31 ENCOUNTER — OFFICE VISIT (OUTPATIENT)
Dept: PRIMARY CARE | Facility: CLINIC | Age: 75
End: 2025-07-31
Payer: MEDICARE

## 2025-07-31 VITALS
BODY MASS INDEX: 31.35 KG/M2 | WEIGHT: 177 LBS | DIASTOLIC BLOOD PRESSURE: 78 MMHG | SYSTOLIC BLOOD PRESSURE: 114 MMHG | TEMPERATURE: 97.5 F

## 2025-07-31 DIAGNOSIS — E08.00 DIABETES MELLITUS DUE TO UNDERLYING CONDITION WITH HYPEROSMOLARITY WITHOUT NONKETOTIC HYPERGLYCEMIC-HYPEROSMOLAR COMA (NKHHC): ICD-10-CM

## 2025-07-31 DIAGNOSIS — I10 PRIMARY HYPERTENSION: Primary | ICD-10-CM

## 2025-07-31 DIAGNOSIS — I48.91 ATRIAL FIBRILLATION, UNSPECIFIED TYPE (MULTI): ICD-10-CM

## 2025-07-31 DIAGNOSIS — N18.31 CHRONIC RENAL IMPAIRMENT, STAGE 3A (MULTI): ICD-10-CM

## 2025-07-31 DIAGNOSIS — F32.A DEPRESSION, UNSPECIFIED DEPRESSION TYPE: ICD-10-CM

## 2025-07-31 DIAGNOSIS — Z12.31 BREAST CANCER SCREENING BY MAMMOGRAM: ICD-10-CM

## 2025-07-31 DIAGNOSIS — G89.29 CHRONIC PAIN OF RIGHT KNEE: ICD-10-CM

## 2025-07-31 DIAGNOSIS — M25.561 CHRONIC PAIN OF RIGHT KNEE: ICD-10-CM

## 2025-07-31 DIAGNOSIS — I50.22 CHRONIC SYSTOLIC HEART FAILURE: ICD-10-CM

## 2025-07-31 PROCEDURE — 3074F SYST BP LT 130 MM HG: CPT | Performed by: INTERNAL MEDICINE

## 2025-07-31 PROCEDURE — 3078F DIAST BP <80 MM HG: CPT | Performed by: INTERNAL MEDICINE

## 2025-07-31 PROCEDURE — G2211 COMPLEX E/M VISIT ADD ON: HCPCS | Performed by: INTERNAL MEDICINE

## 2025-07-31 PROCEDURE — 99214 OFFICE O/P EST MOD 30 MIN: CPT | Performed by: INTERNAL MEDICINE

## 2025-07-31 ASSESSMENT — ENCOUNTER SYMPTOMS
NERVOUS/ANXIOUS: 0
FATIGUE: 0
BLOOD IN STOOL: 0
BACK PAIN: 0
CONSTIPATION: 0
SINUS PAIN: 0
DIARRHEA: 0
NECK PAIN: 0
MYALGIAS: 0
DYSURIA: 0
FEVER: 0
WEAKNESS: 0
PALPITATIONS: 0
SHORTNESS OF BREATH: 0
COUGH: 0
ABDOMINAL PAIN: 0
CHILLS: 0
FREQUENCY: 0
SORE THROAT: 0
ARTHRALGIAS: 1
HEADACHES: 0
DIZZINESS: 0

## 2025-07-31 NOTE — PROGRESS NOTES
"  Pharmacist Clinic: Cardiology Management    Keesha Franklin is a 75 y.o. female was referred to Clinical Pharmacy Team for anticoagulation and heart failure management.     Referring Provider: Juancarlos Browning MD    THIS IS A FOLLOW UP PATIENT APPOINTMENT. AT LAST VISIT ON 8/27/2024 WITH PHARMACIST (Salvador Magaña).    Appointment was completed by *** who was reached at ***.    REVIEW OF PAST APPNT (IF APPLICABLE):   Patient reports that she did not feel the need to take the Eliquis twice daily, and did not understand why she would need to use the medications twice a day. Discussed the importance of using Eliquis two times a day to help cover blood thinning effect for the full day. She understands and reported she will start taking twice a day. Reports she did not have any recent signs or symptoms of blood clots since the last visit.  She has some bruising during the day, but reports that all bruises heal quickly and without concern.    Allergies Reviewed? {YES/NA/NO:82762}    Allergies[1]    Medical History[2]    Medications Ordered Prior to Encounter[3]      RELEVANT LAB RESULTS:  Lab Results   Component Value Date    BILITOT 0.7 03/05/2025    CALCIUM 10.1 06/02/2025    CO2 24 06/02/2025     06/02/2025    CREATININE 1.80 (H) 06/02/2025    GLUCOSE 97 06/02/2025    ALKPHOS 78 03/05/2025    K 3.8 06/02/2025    PROT 6.9 03/05/2025     06/02/2025    AST 29 03/05/2025    ALT 35 03/05/2025    BUN 37 (H) 06/02/2025    ANIONGAP 14 06/02/2025    MG 1.96 01/21/2025    ALBUMIN 3.9 03/05/2025    GFRF 43 (A) 08/16/2023     Lab Results   Component Value Date    TRIG 135 02/23/2024    CHOL 165 02/23/2024    LDLCALC 76 02/23/2024    HDL 62.4 02/23/2024     No results found for: \"BMCBC\", \"CBCDIF\"     PHARMACEUTICAL ASSESSMENT:    MEDICATION RECONCILIATION    Was a medication reconciliation completed at this visit? {yes,no:62173}  Home Pharmacy Reviewed? {YES-DESCRIBE/NO:09604}    Added:  - ***  Changed:  - " ***  Removed:  - ***    Drug Interactions? No clinically significant drug interactions requiring change in therapy found at the time of this visit.     Medication Documentation Review Audit       Reviewed by Kim Toribio MA (Medical Assistant) on 06/17/25 at 0931      Medication Order Taking? Sig Documenting Provider Last Dose Status   apixaban (Eliquis) 5 mg tablet [id number] Yes Take 1 tablet (5 mg) by mouth 2 times a day. Gabriel Mccabe MD  Active   aspirin 81 mg EC tablet [id number] Yes Take 1 tablet (81 mg) by mouth once daily. Historical Provider, MD  Active   atorvastatin (Lipitor) 80 mg tablet [id number] Yes TAKE 1 TABLET(80 MG) BY MOUTH DAILY AT BEDTIME Gabriel Mccabe MD  Active   blood sugar diagnostic (Accu-Chek Tiffanie Plus test strp) strip [id number] Yes USE TO TEST THREE TIMES A DAY Historical Provider, MD  Active   blood-glucose sensor (Dexcom G7 Sensor) device [id number] Yes 1 kit once daily. Gabriel Mccabe MD  Active   escitalopram (Lexapro) 10 mg tablet [id number] Yes TAKE 1 TABLET(10 MG) BY MOUTH DAILY Gabriel Mccabe MD  Active   furosemide (Lasix) 20 mg tablet [id number] Yes TAKE 2 TABLETS(40 MG) BY MOUTH DAILY   Patient taking differently: Take 4 tablets (80 mg) by mouth once daily.    Juancarlos Browning MD  Active   glipiZIDE XL (Glucotrol XL) 2.5 mg 24 hr tablet [id number] Yes Take 1 tablet (2.5 mg) by mouth once daily. Do not crush, chew, or split. Gabriel Mccabe MD  Active   incontinence pad, liner, disp pad [id number] Yes 1 each 2 times a day. Gabriel Mccabe MD  Active   isosorbide mononitrate ER (Imdur) 60 mg 24 hr tablet [id number] Yes Take 1 tablet (60 mg) by mouth once daily. Do not crush or chew. Gabriel Mccabe MD  Active   metoprolol succinate XL (Toprol-XL) 25 mg 24 hr tablet [id number] Yes Take 0.5 tablets (12.5 mg) by mouth once daily at bedtime. Gabriel Mccabe MD  Active   nitroglycerin (Nitrostat) 0.4 mg SL tablet [id number] Yes Place 1 tablet (0.4 mg) under the tongue  "every 5 minutes if needed for chest pain. Up to 3 times. Historical Provider, MD  Active   pen needle, diabetic (BD Ultra-Fine Sameera Pen Needle) 32 gauge x 5/32\" needle [id number] Yes USE AS DIRECTED TO TEST THREE TIMES A DAY Gabriel Mccabe MD  Active   semaglutide (OZEMPIC) 1 mg/dose (4 mg/3 mL) pen injector [id number] Yes Inject 1 mg under the skin 1 (one) time per week. Gabriel Mccabe MD  Active   spironolactone (Aldactone) 25 mg tablet [id number] Yes TAKE 1 TABLET(25 MG) BY MOUTH DAILY Juancarlos Browning MD  Active   valsartan-hydrochlorothiazide (Diovan-HCT) 160-25 mg tablet [id number] Yes TAKE 1 TABLET BY MOUTH DAILY Gabriel Mccabe MD  Active                    DISEASE MANAGEMENT ASSESSMENT:     ANTICOAGULATION ASSESSMENT    DIAGNOSIS: prevention of nonvalvular atrial fibrilliation stroke and systemic embolism  - Patient is projected to be on anticoagulation indefinitely    The ASCVD Risk score (Tanya DALTON, et al., 2019) failed to calculate for the following reasons:    Risk score cannot be calculated because patient has a medical history suggesting prior/existing ASCVD    MADY VASC SCORING CALCULATOR:   [id number] Stroke Risk Points: 7   Values used to calculate this score:    Points  Metrics       1        Has Congestive Heart Failure: Yes       1        Has Hypertension: Yes       2        Age: 75       1        Has Diabetes: Yes       0        Had Stroke: No                 Had TIA: No                 Had Thromboembolism: No       1        Has Vascular Disease: Yes       1        Clinically Relevant Sex: Female    Lip GH, et al. 2009. © 2010 American [employer]     CURRENT PHARMACOTHERAPY:    Eliquis 5 mg BID      RELEVANT PAST MEDICAL HISTORY:   A-fib, HTN, HLD, CHF, Hx of NSTEMI, T2DM    Affordability/Accessibility: ***  Adherence/Organization: ***previously reported only taking morning dosage   Adverse Reactions: ***  Recent Hospitalizations: ***  Recent Falls/Trauma: ***  Changes in " Tobacco or Alcohol Intake:   Tobacco: ***  Alcohol: ***    CHF ASSESSMENT     Symptom/Staging:  -Most recent ejection fraction: 34%  -NYHA Stage: Unknown    Results for orders placed in visit on 03/31/25    Transthoracic Echo Limited    [facility], [address] [place] [address]  Tel [phone] and Fax [phone]    TRANSTHORACIC ECHOCARDIOGRAM REPORT      Patient Name:       YANCI ZEPEDA     Reading Physician:    [id number] Chuy Browning MD  Study Date:         4/10/2025           Ordering Provider:    [id number] FREDY BROWNING  MRN/PID:            [id number]            Fellow:  Accession#:         [id number]        Nurse:  Date of Birth/Age:  1950 / 75      Sonographer:          Rachel Solitario RDCS  years  Gender assigned at  F                   Additional Staff:  Birth:  Height:             162.56 cm           Admit Date:  Weight:             77.11 kg            Admission Status:     Outpatient  BSA / BMI:          1.83 m2 / 29.18     Encounter#:           [id number]  kg/m2  Blood Pressure:     134/84 mmHg         Department Location:  [facility]  Echo Lab    Study Type:    TRANSTHORACIC ECHO (TTE) LIMITED  Diagnosis/ICD: Unspecified atrial fibrillation-I48.91  Indication:    AFib  CPT Code:      Echo Limited-75697; Doppler Limited-34881; Color Doppler-09971    Patient History:  Valve Disorders:   Mitral Regurgitation and Tricuspid Regurgitation.  Pertinent History: CVA, A-Fib, CHF, CAD, HTN and Hyperlipidemia. Breast cancer,  Mastectomy.    Study Detail: The following Echo studies were performed: 2D, Doppler and color  flow.      PHYSICIAN INTERPRETATION:  Left Ventricle: Left ventricular ejection fraction is moderately decreased, calculated by Finley's biplane at 34%. There are no regional left ventricular wall motion abnormalities. The left ventricular cavity size is normal. There is normal septal and normal posterior left ventricular wall thickness.  Left ventricular diastolic filling was not assessed.  Left Atrium: The left atrial size is moderately dilated.  Right Ventricle: The right ventricle is normal in size. There is normal right ventricular global systolic function.  Right Atrium: The right atrium is mildly dilated.  Aortic Valve: The aortic valve is trileaflet. There is no evidence of aortic valve regurgitation.  Mitral Valve: The mitral valve is normal in structure. There is moderate mitral valve regurgitation.  Tricuspid Valve: The tricuspid valve is structurally normal. There is mild to moderate tricuspid regurgitation. The Doppler estimated RVSP is mildly elevated at 43.5 mmHg.  Pulmonic Valve: The pulmonic valve is structurally normal. There is trace pulmonic valve regurgitation.  Pericardium: There is no pericardial effusion noted.  Aorta: The aortic root is normal.  Systemic Veins: The inferior vena cava appears normal in size.  In comparison to the previous echocardiogram(s): Compared with study dated 2/7/2024, no significant change.      CONCLUSIONS:  1. Left ventricular ejection fraction is moderately decreased, calculated by Finley's biplane at 34%.  2. The left atrial size is moderately dilated.  3. Moderate mitral valve regurgitation.  4. Mild to moderate tricuspid regurgitation visualized.  5. Mildly elevated right ventricular systolic pressure.    QUANTITATIVE DATA SUMMARY:    2D MEASUREMENTS:          Normal Ranges:  LAs:             5.23 cm  (2.7-4.0cm)  IVSd:            0.70 cm  (0.6-1.1cm)  LVPWd:           0.77 cm  (0.6-1.1cm)  LVIDd:           5.04 cm  (3.9-5.9cm)  LVIDs:           4.12 cm  LV Mass Index:   67 g/m2  LVEDV Index:     54 ml/m2  LV % FS          18.2 %      LEFT ATRIUM:                 Normal Ranges:  LA Vol A4C:       99.1 ml    (22+/-6mL/m2)  LA Vol A2C:       91.7 ml  LA Vol BP:        101.4 ml  LA Vol Index A4C: 54.3 ml/m2  LA Vol Index A2C: 50.2 ml/m2  LA Vol Index BP:  55.5 ml/m2  LA Volume Index:  55.4  ml/m2  LA Vol A4C:       88.6 ml  LA Vol A2C:       83.3 ml  LA Vol Index BSA: 47.1 ml/m2      RIGHT ATRIUM:          Normal Ranges:  RA Area A4C:  17.9 cm2      LV SYSTOLIC FUNCTION:  Normal Ranges:  EF-A4C View:    35 % (>=55%)  EF-A2C View:    30 %  EF-Biplane:     34 %  LV EF Reported: 34 %      TRICUSPID VALVE/RVSP:          Normal Ranges:  Peak TR Velocity:     3.18 m/s  RV Syst Pressure:     43 mmHg  (< 30mmHg)      [id number] Chuy Browning MD  Electronically signed on 4/10/2025 at 11:13:58 AM        ** Final **      Guideline-Directed Medical Therapy:  -ARNI: Yes, describe: Entresto 24-26 mg BID  -Beta Blocker: Yes, describe: Metoprolol succinate 25 mg 0.5 tablets (12.5 mg) nightly  -MRA: Yes, describe: Spironolactone 25 mg every day   -SGLT2i: Yes, describe: Jardiance 10 mg every day     Secondary Prevention:  -The ASCVD Risk score (Tanya DALTON, et al., 2019) failed to calculate for the following reasons:    Risk score cannot be calculated because patient has a medical history suggesting prior/existing ASCVD   -Aspirin 81mg? yes  -Statin?: Yes, describe: Atorvastatin 80 mg every day    -HTN?: Yes, describe: 136/85  as of 6/17/2025    CURRENT PHARMACOTHERAPY:   Jardiance 10 mg every day   Furosemide 20 mg 4 tablets (80 mg) every day   Metoprolol succinate 25 mg 0.5 tablets (12.5 mg) every day   Entresto 24-26 mg BID  Spironolactone 25 mg every day     Affordability: ***  Adherence/Compliance: ***  Adverse Effects: ***    Monitoring Weights at Home: {YES/NA/NO:41122}  Home Weight Recordings: ***    Past In Office Weight Readings:   Wt Readings from Last 6 Encounters:   07/31/25 80.3 kg (177 lb)   06/17/25 80.6 kg (177 lb 11.2 oz)   06/02/25 80.7 kg (178 lb)   03/31/25 81.2 kg (179 lb)   03/13/25 77.1 kg (170 lb)   03/05/25 77.1 kg (170 lb)       Monitoring Blood Pressure at Home: {YES/NA/NO:19296}  Home BP Recordings: ***    Past In Office BP Readings:   BP Readings from Last 6 Encounters:   06/17/25 136/85    06/02/25 122/82   03/31/25 134/84   03/05/25 (!) 115/98   01/22/25 123/77   12/13/24 144/88       HEALTH MANAGEMENT    Maintaining fluid restriction (<2 L/day): N/A  Edema/swelling: {YES/NA/NO:17085}  Shortness of breath: {YES/NA/NO:28601}  Trouble sleeping/lying down: {YES/NA/NO:52893}  Dry/hacking cough: {YES/NA/NO:83926}  Recent Hospitalizations: {YES-DESCRIBE/NO:52790}    EDUCATION/COUNSELING:   - Counseled patient on MOA, expectations, duration of therapy, contraindications, administration, and monitoring parameters  - Counseled patient on lifestyle modifications that can decrease your risk of having complications (smoking cessation, losing weight, daily weights, vaccines)  - Counseled patient on fluid intake and weight management. Recommended to not consume more than 2 liters of fliuids per day. If they have gained more than 2-3 pounds within a 24 hours period (or 5 pounds in a week), contact their cardiologist  - Answered all patient questions and concerns   EDUCATION/COUNSELING:   - Counseled patient on MOA, expectations, duration of therapy, contraindications, administration, and monitoring parameters  - Counseled patient of side effects that are indicative of bleeding such as dark tarry stool, unexplainable bruising, or vomiting up a coffee ground like substance  - ***       DISCUSSION/NOTES:   ***    ASSESSMENT:    Assessment/Plan   Problem List Items Addressed This Visit    None        RECOMMENDATIONS/PLAN:    ***    Last Appnt with Referring Provider: 6/17/2025  Next Appnt with Referring Provider: 8/5/2025  Clinical Pharmacist follow up: ***  VAF/Application Expiration: {YES/DATE/NO:29367}  Type of Encounter: {VRT/INPRSN:32032}    Sierra Cifuentes PharmD    Verbal consent to manage patient's drug therapy was obtained from {PTAUTH:82260}. They were informed they may decline to participate or withdraw from participation in pharmacy services at any time.    Continue all meds under the continuation of care  with the referring provider and clinical pharmacy team.            [1]   Allergies  Allergen Reactions    Lisinopril Angioedema and Unknown   [2]   Past Medical History:  Diagnosis Date    Adjustment disorder with depressed mood 11/11/2014    Grief reaction    Body mass index (BMI) 35.0-35.9, adult 08/30/2021    Body mass index (BMI) of 35.0 to 35.9 in adult    Cardiac murmur, unspecified 06/18/2021    Systolic murmur    Cerebral infarction, unspecified (Multi) 10/25/2022    Acute cerebrovascular accident (CVA)    Chronic kidney disease, stage 3a (Multi) 04/19/2022    Chronic renal impairment, stage 3a    Encounter for general adult medical examination without abnormal findings 01/28/2021    Medicare annual wellness visit, subsequent    Encounter for screening for malignant neoplasm of colon 11/07/2019    Colon cancer screening    Encounter for screening for malignant neoplasm of colon 01/28/2021    Colon cancer screening    Essential (primary) hypertension 10/25/2022    Benign essential hypertension    Mammographic microcalcification found on diagnostic imaging of breast 09/09/2014    Abnormal mammogram with microcalcification    Menopausal and female climacteric states 11/07/2019    Menopause syndrome    Obesity, unspecified 06/30/2022    Class 2 obesity with body mass index (BMI) of 35.0 to 35.9 in adult    Pain in left foot 08/01/2022    Left foot pain    Pain in left hip 12/20/2016    Left hip pain    Personal history of other endocrine, nutritional and metabolic disease 10/25/2022    History of diabetes mellitus    Polyneuropathy, unspecified 08/01/2022    Peripheral neuropathy    Primary osteoarthritis, unspecified ankle and foot 08/01/2022    Arthritis of foot    Type 2 diabetes mellitus with diabetic chronic kidney disease (Multi) 08/30/2021    Diabetes mellitus with stage 3a chronic kidney disease, without long-term current use of insulin   [3]   Current Outpatient Medications on File Prior to Visit  "  Medication Sig Dispense Refill    apixaban (Eliquis) 5 mg tablet Take 1 tablet (5 mg) by mouth 2 times a day. 180 tablet 1    aspirin 81 mg EC tablet Take 1 tablet (81 mg) by mouth once daily.      atorvastatin (Lipitor) 80 mg tablet TAKE 1 TABLET(80 MG) BY MOUTH DAILY AT BEDTIME 90 tablet 1    blood sugar diagnostic (Accu-Chek Tiffanie Plus test strp) strip USE TO TEST THREE TIMES A DAY      blood-glucose sensor (Dexcom G7 Sensor) device 1 kit once daily. 1 each 1    empagliflozin (Jardiance) 10 mg tablet Take 1 tablet (10 mg) by mouth once daily. 90 tablet 3    escitalopram (Lexapro) 10 mg tablet TAKE 1 TABLET(10 MG) BY MOUTH DAILY 90 tablet 1    furosemide (Lasix) 20 mg tablet Take 4 tablets (80 mg) by mouth once daily. 360 tablet 1    glipiZIDE XL (Glucotrol XL) 2.5 mg 24 hr tablet Take 1 tablet (2.5 mg) by mouth once daily. Do not crush, chew, or split. 30 tablet 11    incontinence pad, liner, disp pad 1 each 2 times a day. 100 each 3    isosorbide mononitrate ER (Imdur) 60 mg 24 hr tablet TAKE 1 TABLET(60 MG) BY MOUTH DAILY. DO NOT CRUSH OR CHEW 90 tablet 1    metoprolol succinate XL (Toprol-XL) 25 mg 24 hr tablet Take 0.5 tablets (12.5 mg) by mouth once daily at bedtime. 90 tablet 1    nitroglycerin (Nitrostat) 0.4 mg SL tablet Place 1 tablet (0.4 mg) under the tongue every 5 minutes if needed for chest pain. Up to 3 times.      pen needle, diabetic (BD Ultra-Fine Sameera Pen Needle) 32 gauge x 5/32\" needle USE AS DIRECTED TO TEST THREE TIMES A  each 5    sacubitriL-valsartan (Entresto) 24-26 mg tablet Take 1 tablet by mouth 2 times a day. 180 tablet 1    semaglutide (OZEMPIC) 1 mg/dose (4 mg/3 mL) pen injector Inject 1 mg under the skin 1 (one) time per week. 3 mL 2    spironolactone (Aldactone) 25 mg tablet TAKE 1 TABLET(25 MG) BY MOUTH DAILY 90 tablet 0     No current facility-administered medications on file prior to visit.     " of 35.0 to 35.9 in adult    Pain in left foot 08/01/2022    Left foot pain    Pain in left hip 12/20/2016    Left hip pain    Personal history of other endocrine, nutritional and metabolic disease 10/25/2022    History of diabetes mellitus    Polyneuropathy, unspecified 08/01/2022    Peripheral neuropathy    Primary osteoarthritis, unspecified ankle and foot 08/01/2022    Arthritis of foot    Type 2 diabetes mellitus with diabetic chronic kidney disease (Multi) 08/30/2021    Diabetes mellitus with stage 3a chronic kidney disease, without long-term current use of insulin   [3]   Current Outpatient Medications on File Prior to Visit   Medication Sig Dispense Refill    apixaban (Eliquis) 5 mg tablet Take 1 tablet (5 mg) by mouth 2 times a day. 180 tablet 1    aspirin 81 mg EC tablet Take 1 tablet (81 mg) by mouth once daily.      atorvastatin (Lipitor) 80 mg tablet TAKE 1 TABLET(80 MG) BY MOUTH DAILY AT BEDTIME 90 tablet 1    escitalopram (Lexapro) 10 mg tablet TAKE 1 TABLET(10 MG) BY MOUTH DAILY 90 tablet 1    furosemide (Lasix) 20 mg tablet Take 4 tablets (80 mg) by mouth once daily. 360 tablet 1    glipiZIDE XL (Glucotrol XL) 2.5 mg 24 hr tablet Take 1 tablet (2.5 mg) by mouth once daily. Do not crush, chew, or split. 30 tablet 11    isosorbide mononitrate ER (Imdur) 60 mg 24 hr tablet TAKE 1 TABLET(60 MG) BY MOUTH DAILY. DO NOT CRUSH OR CHEW 90 tablet 1    metoprolol succinate XL (Toprol-XL) 25 mg 24 hr tablet Take 0.5 tablets (12.5 mg) by mouth once daily at bedtime. 90 tablet 1    nitroglycerin (Nitrostat) 0.4 mg SL tablet Place 1 tablet (0.4 mg) under the tongue every 5 minutes if needed for chest pain. Up to 3 times.      spironolactone (Aldactone) 25 mg tablet TAKE 1 TABLET(25 MG) BY MOUTH DAILY 90 tablet 0    blood sugar diagnostic (Accu-Chek Tiffanie Plus test strp) strip USE TO TEST THREE TIMES A DAY      blood-glucose sensor (Dexcom G7 Sensor) device 1 kit once daily. 1 each 1    empagliflozin (Jardiance) 10  "mg tablet Take 1 tablet (10 mg) by mouth once daily. (Patient not taking: Reported on 8/4/2025) 90 tablet 3    incontinence pad, liner, disp pad 1 each 2 times a day. 100 each 3    pen needle, diabetic (BD Ultra-Fine Sameera Pen Needle) 32 gauge x 5/32\" needle USE AS DIRECTED TO TEST THREE TIMES A  each 5    sacubitriL-valsartan (Entresto) 24-26 mg tablet Take 1 tablet by mouth 2 times a day. (Patient not taking: Reported on 8/4/2025) 180 tablet 1    semaglutide (OZEMPIC) 1 mg/dose (4 mg/3 mL) pen injector Inject 1 mg under the skin 1 (one) time per week. (Patient not taking: Reported on 8/4/2025) 3 mL 2    [DISCONTINUED] apixaban (Eliquis) 5 mg tablet Take 1 tablet (5 mg) by mouth 2 times a day. 180 tablet 1     No current facility-administered medications on file prior to visit.     "

## 2025-07-31 NOTE — PROGRESS NOTES
Subjective   Patient ID: Keesha Franklin is a 75 y.o. female who presents for Follow-up (Pt present today for right breast pain. ls).    HPI Ms. Franklin is 75-year-old female seen in office today for follow-up.  She is experiencing pain in her right breast.  Past history of breast cancer, status post left mastectomy and chemo and radiation.  She had a biopsy of right breast few years ago.  Her medical history is significant for hypertension, hyperlipidemia, CKD, history of CVA, hyperlipidemia, diabetes.  She has difficulty ambulating due to worsening right knee pain and feeling weak.    Review of Systems   Constitutional:  Negative for chills, fatigue and fever.   HENT:  Negative for congestion, sinus pain and sore throat.    Respiratory:  Negative for cough and shortness of breath.    Cardiovascular:  Negative for chest pain, palpitations and leg swelling.   Gastrointestinal:  Negative for abdominal pain, blood in stool, constipation and diarrhea.   Genitourinary:  Negative for dysuria and frequency.   Musculoskeletal:  Positive for arthralgias and gait problem. Negative for back pain, myalgias and neck pain.        Right knee pain   Neurological:  Negative for dizziness, weakness and headaches.   Psychiatric/Behavioral:  The patient is not nervous/anxious.        Objective   /78 (BP Location: Left arm, Patient Position: Sitting)   Temp 36.4 °C (97.5 °F)   Wt 80.3 kg (177 lb)   BMI 31.35 kg/m²     Physical Exam  Vitals reviewed.   Constitutional:       General: She is not in acute distress.     Appearance: Normal appearance.   HENT:      Head: Normocephalic and atraumatic.     Cardiovascular:      Rate and Rhythm: Normal rate and regular rhythm.      Pulses: Normal pulses.   Pulmonary:      Effort: Pulmonary effort is normal. No respiratory distress.      Breath sounds: Normal breath sounds.   Abdominal:      General: Bowel sounds are normal. There is no distension.      Tenderness: There is no abdominal  tenderness.     Musculoskeletal:         General: No swelling or tenderness. Normal range of motion.      Cervical back: Normal range of motion.     Skin:     General: Skin is warm.     Neurological:      General: No focal deficit present.      Mental Status: She is alert.      Motor: Weakness present.      Coordination: Coordination normal.      Gait: Gait abnormal.     Psychiatric:         Mood and Affect: Mood normal.         Behavior: Behavior normal.         Assessment/Plan   Diagnoses and all orders for this visit:  Primary hypertension  Comments:  Blood pressure is well-controlled  Orders:  -     CBC; Future  -     TSH with reflex to Free T4 if abnormal; Future  -     Lipid Panel; Future  Diabetes mellitus due to underlying condition with hyperosmolarity without nonketotic hyperglycemic-hyperosmolar coma ([facility])  Comments:  Recent hemoglobin A1c 6.9  Continue Ozempic, Jardiance, glipizide  Chronic systolic heart failure  Comments:  Stable  Continue spironolactone, metoprolol, isosorbide, Entresto and Jardiance  Depression, unspecified depression type  Comments:  Stable  Continue Lexapro  Breast cancer screening by mammogram  Comments:  Right breast mammogram ordered  Orders:  -     BI mammo right diagnostic; Future  Atrial fibrillation, unspecified type ([facility])  Comments:  Continue metoprolol and Eliquis  Orders:  -     apixaban (Eliquis) 5 mg tablet; Take 1 tablet (5 mg) by mouth 2 times a day.  Chronic renal impairment, stage 3a ([facility])  Comments:  Check renal function in 2 months  Follow-up in 3 months  Orders:  -     Comprehensive Metabolic Panel; Future  Chronic pain of right knee  Comments:  Refer to orthopedics  Encouraged low impact exercises like chair yoga or chair exercises  Orders:  -     Referral to Orthopedics and Sports Medicine; Future    Blood work to be done in 2 months  Follow-up in 3 months

## 2025-07-31 NOTE — PATIENT INSTRUCTIONS
You are seen today for a follow-up visit  Continue current medications  Blood work to be done in 2 months  Follow-up in our office in 3 months  Schedule your mammogram

## 2025-08-04 ENCOUNTER — APPOINTMENT (OUTPATIENT)
Dept: PHARMACY | Facility: HOSPITAL | Age: 75
End: 2025-08-04
Payer: MEDICARE

## 2025-08-04 DIAGNOSIS — I50.22 CHRONIC SYSTOLIC HEART FAILURE: Primary | ICD-10-CM

## 2025-08-04 DIAGNOSIS — I48.91 ATRIAL FIBRILLATION, UNSPECIFIED TYPE (MULTI): ICD-10-CM

## 2025-08-04 NOTE — ASSESSMENT & PLAN NOTE
Keesha continues on anticoagulation with Eliquis. No dosage adjustment required for age, weight, and renal function.

## 2025-08-04 NOTE — Clinical Note
Rob Browning, No abnormal bruising or bleeding reported with Eliquis. No recent hospitalizations or falls. Keesha confirms stopping valsartan-hydrochlorothiazide, and has briefly taken Entresto and Jardiance. However, she is now out of the medications due to cost.  We will be applying for  PAP for assistance.   I also wanted to reach out to see if you would like her to restart Entresto first, then Jardiance at a follow up, due to her previous side effects to the medication and lower eGFR. Please let me know, thank you!

## 2025-08-05 ENCOUNTER — APPOINTMENT (OUTPATIENT)
Dept: CARDIOLOGY | Facility: CLINIC | Age: 75
End: 2025-08-05
Payer: MEDICARE

## 2025-08-18 ENCOUNTER — APPOINTMENT (OUTPATIENT)
Dept: ORTHOPEDIC SURGERY | Facility: CLINIC | Age: 75
End: 2025-08-18
Payer: MEDICARE

## 2025-08-19 ENCOUNTER — APPOINTMENT (OUTPATIENT)
Dept: CARDIOLOGY | Facility: CLINIC | Age: 75
End: 2025-08-19
Payer: MEDICARE

## 2025-08-26 ENCOUNTER — APPOINTMENT (OUTPATIENT)
Dept: ORTHOPEDIC SURGERY | Facility: HOSPITAL | Age: 75
End: 2025-08-26
Payer: MEDICARE

## 2025-09-04 ENCOUNTER — APPOINTMENT (OUTPATIENT)
Dept: PHARMACY | Facility: HOSPITAL | Age: 75
End: 2025-09-04
Payer: MEDICARE

## 2025-09-05 ENCOUNTER — TELEPHONE (OUTPATIENT)
Dept: PHARMACY | Facility: HOSPITAL | Age: 75
End: 2025-09-05
Payer: MEDICARE

## 2025-09-11 ENCOUNTER — APPOINTMENT (OUTPATIENT)
Dept: PHARMACY | Facility: HOSPITAL | Age: 75
End: 2025-09-11
Payer: MEDICARE

## 2025-10-23 ENCOUNTER — APPOINTMENT (OUTPATIENT)
Dept: PRIMARY CARE | Facility: CLINIC | Age: 75
End: 2025-10-23
Payer: MEDICARE